# Patient Record
Sex: FEMALE | Race: WHITE | URBAN - METROPOLITAN AREA
[De-identification: names, ages, dates, MRNs, and addresses within clinical notes are randomized per-mention and may not be internally consistent; named-entity substitution may affect disease eponyms.]

---

## 2017-01-09 ENCOUNTER — HOSPITAL ENCOUNTER (OUTPATIENT)
Dept: GENERAL RADIOLOGY | Age: 52
Discharge: HOME | End: 2017-01-09
Attending: INTERNAL MEDICINE | Admitting: INTERNAL MEDICINE

## 2017-01-09 DIAGNOSIS — R05.9 COUGH: ICD-10-CM

## 2017-01-09 DIAGNOSIS — C19 MALIGNANT NEOPLASM OF RECTOSIGMOID JUNCTION (HCC): ICD-10-CM

## 2017-01-09 DIAGNOSIS — R06.02 BREATH SHORTNESS: ICD-10-CM

## 2017-01-11 ENCOUNTER — HOSPITAL ENCOUNTER (OUTPATIENT)
Dept: OTHER | Age: 52
Discharge: HOME | End: 2017-01-11
Attending: INTERNAL MEDICINE | Admitting: INTERNAL MEDICINE

## 2017-01-11 LAB
ALBUMIN SERPL-MCNC: 4 GM/DL (ref 3.4–5)
ALP BLD-CCNC: 168 IU/L (ref 40–129)
ALT SERPL-CCNC: 36 U/L (ref 10–40)
ANION GAP SERPL CALCULATED.3IONS-SCNC: 17 MMOL/L (ref 4–16)
AST SERPL-CCNC: 30 IU/L (ref 15–37)
BILIRUB SERPL-MCNC: 0.1 MG/DL (ref 0–1)
BUN BLDV-MCNC: 10 MG/DL (ref 6–23)
CALCIUM SERPL-MCNC: 8.7 MG/DL (ref 8.3–10.6)
CHLORIDE BLD-SCNC: 98 MMOL/L (ref 99–110)
CO2: 24 MMOL/L (ref 21–32)
CREAT SERPL-MCNC: 1.1 MG/DL (ref 0.6–1.1)
GFR AFRICAN AMERICAN: >60 ML/MIN/1.73M2
GFR NON-AFRICAN AMERICAN: 52 ML/MIN/1.73M2
GLUCOSE FASTING: 81 MG/DL (ref 70–99)
LACTATE DEHYDROGENASE: 230 IU/L (ref 120–246)
POTASSIUM SERPL-SCNC: 4 MMOL/L (ref 3.5–5.1)
SODIUM BLD-SCNC: 139 MMOL/L (ref 135–145)
TOTAL PROTEIN: 7.7 GM/DL (ref 6.4–8.2)

## 2017-01-16 ENCOUNTER — HOSPITAL ENCOUNTER (OUTPATIENT)
Dept: CT IMAGING | Age: 52
Discharge: HOME | End: 2017-01-16
Attending: INTERNAL MEDICINE | Admitting: INTERNAL MEDICINE

## 2017-01-16 DIAGNOSIS — C34.12 CANCER OF BRONCHUS OF LEFT UPPER LOBE (HCC): ICD-10-CM

## 2017-01-16 DIAGNOSIS — C34.12 MALIGNANT NEOPLASM OF UPPER LOBE, LEFT BRONCHUS OR LUNG (HCC): ICD-10-CM

## 2017-01-16 LAB
ALBUMIN SERPL-MCNC: 3.8 GM/DL (ref 3.4–5)
ALP BLD-CCNC: 153 IU/L (ref 40–129)
ALT SERPL-CCNC: 53 U/L (ref 10–40)
ANION GAP SERPL CALCULATED.3IONS-SCNC: 15 MMOL/L (ref 4–16)
AST SERPL-CCNC: 48 IU/L (ref 15–37)
BANDED NEUTROPHILS ABSOLUTE COUNT: 0.05 K/CU MM
BANDED NEUTROPHILS RELATIVE PERCENT: 1 % (ref 5–11)
BILIRUB SERPL-MCNC: 0.3 MG/DL (ref 0–1)
BUN BLDV-MCNC: 20 MG/DL (ref 6–23)
CALCIUM SERPL-MCNC: 8.6 MG/DL (ref 8.3–10.6)
CHLORIDE BLD-SCNC: 100 MMOL/L (ref 99–110)
CO2: 23 MMOL/L (ref 21–32)
CREAT SERPL-MCNC: 1.2 MG/DL (ref 0.6–1.1)
DIFFERENTIAL TYPE: ABNORMAL
GFR AFRICAN AMERICAN: 57 ML/MIN/1.73M2
GFR NON-AFRICAN AMERICAN: 47 ML/MIN/1.73M2
GLUCOSE FASTING: 100 MG/DL (ref 70–99)
HCT VFR BLD CALC: 20.5 % (ref 37–47)
HEMOGLOBIN: 6.3 GM/DL (ref 12.5–16)
LACTATE DEHYDROGENASE: 263 IU/L (ref 120–246)
LYMPHOCYTES ABSOLUTE: 2.1 K/CU MM
LYMPHOCYTES RELATIVE PERCENT: 38 % (ref 24–44)
MACROCYTES: ABNORMAL
MCH RBC QN AUTO: 30.4 PG (ref 27–31)
MCHC RBC AUTO-ENTMCNC: 30.7 % (ref 32–36)
MCV RBC AUTO: 99 FL (ref 78–100)
MONOCYTES ABSOLUTE: 0.2 K/CU MM
MONOCYTES RELATIVE PERCENT: 3 % (ref 0–4)
PDW BLD-RTO: 17.8 % (ref 11.7–14.9)
PLATELET # BLD: 603 K/CU MM (ref 140–440)
PLT MORPHOLOGY: ABNORMAL
PMV BLD AUTO: 10 FL (ref 7.5–11.1)
POLYCHROMASIA: ABNORMAL
POTASSIUM SERPL-SCNC: 4.3 MMOL/L (ref 3.5–5.1)
RBC # BLD: 2.07 M/CU MM (ref 4.2–5.4)
SEGMENTED NEUTROPHILS ABSOLUTE COUNT: 3.1 K/CU MM
SEGMENTED NEUTROPHILS RELATIVE PERCENT: 58 % (ref 36–66)
SODIUM BLD-SCNC: 138 MMOL/L (ref 135–145)
TOTAL PROTEIN: 7.4 GM/DL (ref 6.4–8.2)
TOXIC GRANULATION: PRESENT
WBC # BLD: 5.4 K/CU MM (ref 4–10.5)

## 2017-01-16 RX ORDER — SODIUM CHLORIDE 0.9 % (FLUSH) 0.9 %
10 SYRINGE (ML) INJECTION ONCE
Status: COMPLETED | OUTPATIENT
Start: 2017-01-16 | End: 2017-01-16

## 2017-01-16 RX ADMIN — Medication 10 ML: at 12:03

## 2017-01-18 ENCOUNTER — HOSPITAL ENCOUNTER (OUTPATIENT)
Dept: OTHER | Age: 52
Discharge: HOME | End: 2017-01-18
Attending: INTERNAL MEDICINE | Admitting: INTERNAL MEDICINE

## 2017-01-18 LAB
BILIRUB SERPL-MCNC: 0.2 MG/DL (ref 0–1)
BILIRUBIN DIRECT: 0.2 MG/DL (ref 0–0.3)
BILIRUBIN, INDIRECT: 0 MG/DL (ref 0–0.7)
DIFFERENTIAL TYPE: ABNORMAL
FERRITIN: 621 NG/ML (ref 15–150)
FOLATE: >20 NG/ML (ref 3.1–17.5)
HCT VFR BLD CALC: 25.7 % (ref 37–47)
HEMOGLOBIN: 8 GM/DL (ref 12.5–16)
IRON: 259 UG/DL (ref 37–145)
LACTATE DEHYDROGENASE: 238 IU/L (ref 120–246)
LYMPHOCYTES ABSOLUTE: 1.7 K/CU MM
LYMPHOCYTES RELATIVE PERCENT: 70 % (ref 24–44)
MACROCYTES: ABNORMAL
MCH RBC QN AUTO: 30.8 PG (ref 27–31)
MCHC RBC AUTO-ENTMCNC: 31.1 % (ref 32–36)
MCV RBC AUTO: 98.8 FL (ref 78–100)
MONOCYTES ABSOLUTE: 0 K/CU MM
MONOCYTES RELATIVE PERCENT: 2 % (ref 0–4)
PCT TRANSFERRIN: 77 % (ref 10–44)
PDW BLD-RTO: 17.5 % (ref 11.7–14.9)
PLATELET # BLD: 479 K/CU MM (ref 140–440)
PLT MORPHOLOGY: ABNORMAL
PMV BLD AUTO: 9.1 FL (ref 7.5–11.1)
POLYCHROMASIA: ABNORMAL
RBC # BLD: 2.6 M/CU MM (ref 4.2–5.4)
RETICULOCYTE COUNT PCT: 0.3 % (ref 0.2–2.2)
SEGMENTED NEUTROPHILS ABSOLUTE COUNT: 0.7 K/CU MM
SEGMENTED NEUTROPHILS RELATIVE PERCENT: 28 % (ref 36–66)
TOTAL IRON BINDING CAPACITY: 338 UG/DL (ref 250–450)
UNSATURATED IRON BINDING CAPACITY: 79 UG/DL (ref 110–370)
VITAMIN B-12: 1322 PG/ML (ref 211–911)
WBC # BLD: 2.4 K/CU MM (ref 4–10.5)

## 2017-01-19 LAB — HAPTOGLOBIN: 362

## 2017-01-30 ENCOUNTER — HOSPITAL ENCOUNTER (OUTPATIENT)
Dept: CT IMAGING | Age: 52
Discharge: HOME | End: 2017-01-30
Attending: INTERNAL MEDICINE | Admitting: INTERNAL MEDICINE

## 2017-01-30 DIAGNOSIS — C34.12 CANCER OF BRONCHUS OF LEFT UPPER LOBE (HCC): ICD-10-CM

## 2017-01-30 DIAGNOSIS — C34.12 MALIGNANT NEOPLASM OF UPPER LOBE, LEFT BRONCHUS OR LUNG (HCC): ICD-10-CM

## 2017-01-30 RX ORDER — SODIUM CHLORIDE 0.9 % (FLUSH) 0.9 %
10 SYRINGE (ML) INJECTION ONCE
Status: DISCONTINUED | OUTPATIENT
Start: 2017-01-30 | End: 2017-01-30

## 2017-02-16 ENCOUNTER — HOSPITAL ENCOUNTER (OUTPATIENT)
Dept: PET IMAGING | Age: 52
Discharge: HOME | End: 2017-02-16
Attending: RADIOLOGY | Admitting: RADIOLOGY

## 2017-02-16 DIAGNOSIS — C34.12 CANCER OF BRONCHUS OF LEFT UPPER LOBE (HCC): ICD-10-CM

## 2017-02-16 DIAGNOSIS — C34.12 MALIGNANT NEOPLASM OF UPPER LOBE, LEFT BRONCHUS OR LUNG (HCC): ICD-10-CM

## 2017-02-17 ENCOUNTER — HOSPITAL ENCOUNTER (OUTPATIENT)
Dept: OTHER | Age: 52
Discharge: HOME | End: 2017-02-17
Attending: INTERNAL MEDICINE | Admitting: INTERNAL MEDICINE

## 2017-02-17 LAB
ALBUMIN SERPL-MCNC: 3.8 GM/DL (ref 3.4–5)
ALP BLD-CCNC: 185 IU/L (ref 40–128)
ALT SERPL-CCNC: 26 U/L (ref 10–40)
ANION GAP SERPL CALCULATED.3IONS-SCNC: 19 MMOL/L (ref 4–16)
AST SERPL-CCNC: 31 IU/L (ref 15–37)
BILIRUB SERPL-MCNC: 0.3 MG/DL (ref 0–1)
BUN BLDV-MCNC: 13 MG/DL (ref 6–23)
CALCIUM SERPL-MCNC: 9.8 MG/DL (ref 8.3–10.6)
CEA: 2 NG/ML
CHLORIDE BLD-SCNC: 94 MMOL/L (ref 99–110)
CO2: 25 MMOL/L (ref 21–32)
CREAT SERPL-MCNC: 1.1 MG/DL (ref 0.6–1.1)
GFR AFRICAN AMERICAN: >60 ML/MIN/1.73M2
GFR NON-AFRICAN AMERICAN: 52 ML/MIN/1.73M2
GLUCOSE FASTING: 76 MG/DL (ref 70–99)
LACTATE DEHYDROGENASE: 309 IU/L (ref 120–246)
POTASSIUM SERPL-SCNC: 4.6 MMOL/L (ref 3.5–5.1)
SODIUM BLD-SCNC: 138 MMOL/L (ref 135–145)
TOTAL PROTEIN: 8.8 GM/DL (ref 6.4–8.2)

## 2017-03-06 ENCOUNTER — HOSPITAL ENCOUNTER (OUTPATIENT)
Dept: OTHER | Age: 52
Discharge: HOME | End: 2017-03-06
Attending: INTERNAL MEDICINE | Admitting: INTERNAL MEDICINE

## 2017-03-06 LAB
ALBUMIN SERPL-MCNC: 3.5 GM/DL (ref 3.4–5)
ALP BLD-CCNC: 128 IU/L (ref 40–129)
ALT SERPL-CCNC: 17 U/L (ref 10–40)
ANION GAP SERPL CALCULATED.3IONS-SCNC: 14 MMOL/L (ref 4–16)
AST SERPL-CCNC: 24 IU/L (ref 15–37)
BILIRUB SERPL-MCNC: 0.2 MG/DL (ref 0–1)
BUN BLDV-MCNC: 12 MG/DL (ref 6–23)
CALCIUM SERPL-MCNC: 9.2 MG/DL (ref 8.3–10.6)
CHLORIDE BLD-SCNC: 94 MMOL/L (ref 99–110)
CO2: 25 MMOL/L (ref 21–32)
CREAT SERPL-MCNC: 0.8 MG/DL (ref 0.6–1.1)
GFR AFRICAN AMERICAN: >60 ML/MIN/1.73M2
GFR NON-AFRICAN AMERICAN: >60 ML/MIN/1.73M2
GLUCOSE BLD-MCNC: 89 MG/DL (ref 70–140)
POTASSIUM SERPL-SCNC: 3.9 MMOL/L (ref 3.5–5.1)
SODIUM BLD-SCNC: 133 MMOL/L (ref 135–145)
T3 FREE: 2.4 PG/ML (ref 2.3–4.2)
T4 FREE: 1.01 NG/DL (ref 0.9–1.8)
TOTAL PROTEIN: 7.6 GM/DL (ref 6.4–8.2)
TSH HIGH SENSITIVITY: 0.74 UIU/ML (ref 0.27–4.2)

## 2017-03-07 LAB — T4 TOTAL: 6.62 UG/DL

## 2017-03-27 ENCOUNTER — HOSPITAL ENCOUNTER (OUTPATIENT)
Dept: OTHER | Age: 52
Discharge: HOME | End: 2017-03-27
Attending: INTERNAL MEDICINE | Admitting: INTERNAL MEDICINE

## 2017-03-27 LAB
ALBUMIN SERPL-MCNC: 3.4 GM/DL (ref 3.4–5)
ALP BLD-CCNC: 129 IU/L (ref 40–129)
ALT SERPL-CCNC: 17 U/L (ref 10–40)
ANION GAP SERPL CALCULATED.3IONS-SCNC: 14 MMOL/L (ref 4–16)
AST SERPL-CCNC: 31 IU/L (ref 15–37)
BILIRUB SERPL-MCNC: 0.2 MG/DL (ref 0–1)
BUN BLDV-MCNC: 16 MG/DL (ref 6–23)
CALCIUM SERPL-MCNC: 8.8 MG/DL (ref 8.3–10.6)
CHLORIDE BLD-SCNC: 102 MMOL/L (ref 99–110)
CO2: 24 MMOL/L (ref 21–32)
CREAT SERPL-MCNC: 0.9 MG/DL (ref 0.6–1.1)
GFR AFRICAN AMERICAN: >60 ML/MIN/1.73M2
GFR NON-AFRICAN AMERICAN: >60 ML/MIN/1.73M2
GLUCOSE BLD-MCNC: 77 MG/DL (ref 70–140)
POTASSIUM SERPL-SCNC: 4.4 MMOL/L (ref 3.5–5.1)
SODIUM BLD-SCNC: 140 MMOL/L (ref 135–145)
T3 FREE: 2.3 PG/ML (ref 2.3–4.2)
T4 FREE: 1.02 NG/DL (ref 0.9–1.8)
TOTAL PROTEIN: 7.9 GM/DL (ref 6.4–8.2)
TSH HIGH SENSITIVITY: 0.98 UIU/ML (ref 0.27–4.2)

## 2017-03-29 LAB — T4 TOTAL: 6.88 UG/DL

## 2017-04-12 ENCOUNTER — HOSPITAL ENCOUNTER (OUTPATIENT)
Dept: OTHER | Age: 52
Discharge: HOME | End: 2017-04-12
Attending: INTERNAL MEDICINE | Admitting: INTERNAL MEDICINE

## 2017-04-12 LAB
ALBUMIN SERPL-MCNC: 4.1 GM/DL (ref 3.4–5)
ALP BLD-CCNC: 133 IU/L (ref 40–128)
ALT SERPL-CCNC: 21 U/L (ref 10–40)
ANION GAP SERPL CALCULATED.3IONS-SCNC: 14 MMOL/L (ref 4–16)
AST SERPL-CCNC: 25 IU/L (ref 15–37)
BILIRUB SERPL-MCNC: 0.2 MG/DL (ref 0–1)
BUN BLDV-MCNC: 14 MG/DL (ref 6–23)
CALCIUM SERPL-MCNC: 9.2 MG/DL (ref 8.3–10.6)
CEA: 1.7 NG/ML
CHLORIDE BLD-SCNC: 103 MMOL/L (ref 99–110)
CO2: 23 MMOL/L (ref 21–32)
CREAT SERPL-MCNC: 1 MG/DL (ref 0.6–1.1)
GFR AFRICAN AMERICAN: >60 ML/MIN/1.73M2
GFR NON-AFRICAN AMERICAN: 58 ML/MIN/1.73M2
GLUCOSE BLD-MCNC: 76 MG/DL (ref 70–140)
LACTATE DEHYDROGENASE: 231 IU/L (ref 120–246)
POTASSIUM SERPL-SCNC: 4.2 MMOL/L (ref 3.5–5.1)
SODIUM BLD-SCNC: 140 MMOL/L (ref 135–145)
TOTAL PROTEIN: 7.7 GM/DL (ref 6.4–8.2)

## 2017-05-08 ENCOUNTER — HOSPITAL ENCOUNTER (OUTPATIENT)
Dept: OTHER | Age: 52
Discharge: HOME | End: 2017-05-08
Attending: INTERNAL MEDICINE | Admitting: INTERNAL MEDICINE

## 2017-05-08 LAB
ALBUMIN SERPL-MCNC: 4 GM/DL (ref 3.4–5)
ALP BLD-CCNC: 139 IU/L (ref 40–129)
ALT SERPL-CCNC: 23 U/L (ref 10–40)
ANION GAP SERPL CALCULATED.3IONS-SCNC: 14 MMOL/L (ref 4–16)
AST SERPL-CCNC: 32 IU/L (ref 15–37)
BILIRUB SERPL-MCNC: 0.1 MG/DL (ref 0–1)
BUN BLDV-MCNC: 16 MG/DL (ref 6–23)
CALCIUM SERPL-MCNC: 9.1 MG/DL (ref 8.3–10.6)
CHLORIDE BLD-SCNC: 106 MMOL/L (ref 99–110)
CO2: 23 MMOL/L (ref 21–32)
CREAT SERPL-MCNC: 0.9 MG/DL (ref 0.6–1.1)
GFR AFRICAN AMERICAN: >60 ML/MIN/1.73M2
GFR NON-AFRICAN AMERICAN: >60 ML/MIN/1.73M2
GLUCOSE BLD-MCNC: 92 MG/DL (ref 70–140)
LACTATE DEHYDROGENASE: 232 IU/L (ref 120–246)
POTASSIUM SERPL-SCNC: 4.5 MMOL/L (ref 3.5–5.1)
SODIUM BLD-SCNC: 143 MMOL/L (ref 135–145)
TOTAL PROTEIN: 7.1 GM/DL (ref 6.4–8.2)

## 2017-05-12 ENCOUNTER — HOSPITAL ENCOUNTER (OUTPATIENT)
Dept: CT IMAGING | Age: 52
Discharge: HOME | End: 2017-05-12
Attending: INTERNAL MEDICINE | Admitting: INTERNAL MEDICINE

## 2017-05-12 DIAGNOSIS — C78.1 SECONDARY MALIGNANT NEOPLASM OF MEDIASTINUM (HCC): ICD-10-CM

## 2017-05-12 RX ORDER — 0.9 % SODIUM CHLORIDE 0.9 %
10 VIAL (ML) INJECTION 2 TIMES DAILY
Status: DISCONTINUED | OUTPATIENT
Start: 2017-05-12 | End: 2017-05-13 | Stop reason: HOSPADM

## 2017-05-12 RX ADMIN — Medication 10 ML: at 11:08

## 2017-05-30 ENCOUNTER — HOSPITAL ENCOUNTER (OUTPATIENT)
Dept: OTHER | Age: 52
Discharge: HOME | End: 2017-05-30
Attending: INTERNAL MEDICINE | Admitting: INTERNAL MEDICINE

## 2017-05-30 LAB
ALBUMIN SERPL-MCNC: 4.1 GM/DL (ref 3.4–5)
ALP BLD-CCNC: 120 IU/L (ref 40–129)
ALT SERPL-CCNC: 16 U/L (ref 10–40)
ANION GAP SERPL CALCULATED.3IONS-SCNC: 17 MMOL/L (ref 4–16)
AST SERPL-CCNC: 26 IU/L (ref 15–37)
BILIRUB SERPL-MCNC: 0.1 MG/DL (ref 0–1)
BUN BLDV-MCNC: 15 MG/DL (ref 6–23)
CALCIUM SERPL-MCNC: 9.3 MG/DL (ref 8.3–10.6)
CHLORIDE BLD-SCNC: 104 MMOL/L (ref 99–110)
CO2: 21 MMOL/L (ref 21–32)
CREAT SERPL-MCNC: 0.8 MG/DL (ref 0.6–1.1)
GFR AFRICAN AMERICAN: >60 ML/MIN/1.73M2
GFR NON-AFRICAN AMERICAN: >60 ML/MIN/1.73M2
GLUCOSE BLD-MCNC: 98 MG/DL (ref 70–140)
POTASSIUM SERPL-SCNC: 4.2 MMOL/L (ref 3.5–5.1)
SODIUM BLD-SCNC: 142 MMOL/L (ref 135–145)
T3 FREE: 3.3 PG/ML (ref 2.3–4.2)
T4 FREE: 1.34 NG/DL (ref 0.9–1.8)
TOTAL PROTEIN: 7.4 GM/DL (ref 6.4–8.2)
TSH HIGH SENSITIVITY: 1.03 UIU/ML (ref 0.27–4.2)

## 2017-06-01 LAB — T4 TOTAL: 8.71 UG/DL

## 2017-06-20 ENCOUNTER — HOSPITAL ENCOUNTER (OUTPATIENT)
Dept: OTHER | Age: 52
Discharge: HOME | End: 2017-06-20
Attending: INTERNAL MEDICINE | Admitting: INTERNAL MEDICINE

## 2017-06-20 LAB
ALBUMIN SERPL-MCNC: 4 GM/DL (ref 3.4–5)
ALP BLD-CCNC: 119 IU/L (ref 40–129)
ALT SERPL-CCNC: 15 U/L (ref 10–40)
ANION GAP SERPL CALCULATED.3IONS-SCNC: 17 MMOL/L (ref 4–16)
AST SERPL-CCNC: 22 IU/L (ref 15–37)
BILIRUB SERPL-MCNC: 0.1 MG/DL (ref 0–1)
BUN BLDV-MCNC: 15 MG/DL (ref 6–23)
CALCIUM SERPL-MCNC: 9.2 MG/DL (ref 8.3–10.6)
CHLORIDE BLD-SCNC: 103 MMOL/L (ref 99–110)
CO2: 22 MMOL/L (ref 21–32)
CREAT SERPL-MCNC: 0.9 MG/DL (ref 0.6–1.1)
GFR AFRICAN AMERICAN: >60 ML/MIN/1.73M2
GFR NON-AFRICAN AMERICAN: >60 ML/MIN/1.73M2
GLUCOSE BLD-MCNC: 97 MG/DL (ref 70–140)
POTASSIUM SERPL-SCNC: 4.4 MMOL/L (ref 3.5–5.1)
SODIUM BLD-SCNC: 142 MMOL/L (ref 135–145)
T3 FREE: 2.4 PG/ML (ref 2.3–4.2)
T4 FREE: 1.18 NG/DL (ref 0.9–1.8)
TOTAL PROTEIN: 7.2 GM/DL (ref 6.4–8.2)
TSH HIGH SENSITIVITY: 0.83 UIU/ML (ref 0.27–4.2)

## 2017-06-22 LAB — T4 TOTAL: 7.13 UG/DL

## 2017-08-01 ENCOUNTER — HOSPITAL ENCOUNTER (OUTPATIENT)
Dept: OTHER | Age: 52
Discharge: HOME | End: 2017-08-01
Attending: INTERNAL MEDICINE | Admitting: INTERNAL MEDICINE

## 2017-08-01 LAB
ALBUMIN SERPL-MCNC: 3.9 GM/DL (ref 3.4–5)
ALP BLD-CCNC: 129 IU/L (ref 40–128)
ALT SERPL-CCNC: 19 U/L (ref 10–40)
ANION GAP SERPL CALCULATED.3IONS-SCNC: 12 MMOL/L (ref 4–16)
AST SERPL-CCNC: 25 IU/L (ref 15–37)
BILIRUB SERPL-MCNC: 0.2 MG/DL (ref 0–1)
BUN BLDV-MCNC: 16 MG/DL (ref 6–23)
CALCIUM SERPL-MCNC: 9.1 MG/DL (ref 8.3–10.6)
CHLORIDE BLD-SCNC: 103 MMOL/L (ref 99–110)
CO2: 24 MMOL/L (ref 21–32)
CREAT SERPL-MCNC: 1.1 MG/DL (ref 0.6–1.1)
GFR AFRICAN AMERICAN: >60 ML/MIN/1.73M2
GFR NON-AFRICAN AMERICAN: 52 ML/MIN/1.73M2
GLUCOSE BLD-MCNC: 82 MG/DL (ref 70–140)
LACTATE DEHYDROGENASE: 155 IU/L (ref 120–246)
POTASSIUM SERPL-SCNC: 4.5 MMOL/L (ref 3.5–5.1)
SODIUM BLD-SCNC: 139 MMOL/L (ref 135–145)
TOTAL PROTEIN: 7 GM/DL (ref 6.4–8.2)

## 2017-08-22 ENCOUNTER — HOSPITAL ENCOUNTER (OUTPATIENT)
Dept: OTHER | Age: 52
Discharge: HOME | End: 2017-08-22
Attending: INTERNAL MEDICINE | Admitting: INTERNAL MEDICINE

## 2017-08-22 LAB
ALBUMIN SERPL-MCNC: 4.1 GM/DL (ref 3.4–5)
ALP BLD-CCNC: 129 IU/L (ref 40–128)
ALT SERPL-CCNC: 31 U/L (ref 10–40)
ANION GAP SERPL CALCULATED.3IONS-SCNC: 14 MMOL/L (ref 4–16)
AST SERPL-CCNC: 31 IU/L (ref 15–37)
BILIRUB SERPL-MCNC: 0.2 MG/DL (ref 0–1)
BUN BLDV-MCNC: 16 MG/DL (ref 6–23)
CALCIUM SERPL-MCNC: 9.2 MG/DL (ref 8.3–10.6)
CHLORIDE BLD-SCNC: 106 MMOL/L (ref 99–110)
CO2: 21 MMOL/L (ref 21–32)
CREAT SERPL-MCNC: 0.8 MG/DL (ref 0.6–1.1)
GFR AFRICAN AMERICAN: >60 ML/MIN/1.73M2
GFR NON-AFRICAN AMERICAN: >60 ML/MIN/1.73M2
GLUCOSE BLD-MCNC: 81 MG/DL (ref 70–140)
POTASSIUM SERPL-SCNC: 4.5 MMOL/L (ref 3.5–5.1)
SODIUM BLD-SCNC: 141 MMOL/L (ref 135–145)
TOTAL PROTEIN: 7.4 GM/DL (ref 6.4–8.2)
TSH HIGH SENSITIVITY: 1.29 UIU/ML (ref 0.27–4.2)

## 2017-09-14 ENCOUNTER — HOSPITAL ENCOUNTER (OUTPATIENT)
Dept: OTHER | Age: 52
Discharge: HOME | End: 2017-09-14
Attending: INTERNAL MEDICINE | Admitting: INTERNAL MEDICINE

## 2017-09-14 LAB
ALBUMIN SERPL-MCNC: 4.3 GM/DL (ref 3.4–5)
ALP BLD-CCNC: 134 IU/L (ref 40–128)
ALT SERPL-CCNC: 26 U/L (ref 10–40)
ANION GAP SERPL CALCULATED.3IONS-SCNC: 15 MMOL/L (ref 4–16)
AST SERPL-CCNC: 26 IU/L (ref 15–37)
BILIRUB SERPL-MCNC: 0.2 MG/DL (ref 0–1)
BUN BLDV-MCNC: 17 MG/DL (ref 6–23)
CALCIUM SERPL-MCNC: 9.1 MG/DL (ref 8.3–10.6)
CHLORIDE BLD-SCNC: 102 MMOL/L (ref 99–110)
CO2: 21 MMOL/L (ref 21–32)
CREAT SERPL-MCNC: 0.9 MG/DL (ref 0.6–1.1)
GFR AFRICAN AMERICAN: >60 ML/MIN/1.73M2
GFR NON-AFRICAN AMERICAN: >60 ML/MIN/1.73M2
GLUCOSE BLD-MCNC: 82 MG/DL (ref 70–140)
POTASSIUM SERPL-SCNC: 4.3 MMOL/L (ref 3.5–5.1)
SODIUM BLD-SCNC: 138 MMOL/L (ref 135–145)
T4 FREE: 1.06 NG/DL (ref 0.9–1.8)
TOTAL PROTEIN: 7.6 GM/DL (ref 6.4–8.2)
TSH HIGH SENSITIVITY: 1.39 UIU/ML (ref 0.27–4.2)

## 2017-10-05 ENCOUNTER — HOSPITAL ENCOUNTER (OUTPATIENT)
Dept: OTHER | Age: 52
Discharge: HOME | End: 2017-10-05
Attending: INTERNAL MEDICINE | Admitting: INTERNAL MEDICINE

## 2017-10-05 LAB
ALBUMIN SERPL-MCNC: 4.4 GM/DL (ref 3.4–5)
ALP BLD-CCNC: 134 IU/L (ref 40–129)
ALT SERPL-CCNC: 33 U/L (ref 10–40)
ANION GAP SERPL CALCULATED.3IONS-SCNC: 18 MMOL/L (ref 4–16)
AST SERPL-CCNC: 32 IU/L (ref 15–37)
BILIRUB SERPL-MCNC: 0.2 MG/DL (ref 0–1)
BUN BLDV-MCNC: 23 MG/DL (ref 6–23)
CALCIUM SERPL-MCNC: 9.1 MG/DL (ref 8.3–10.6)
CHLORIDE BLD-SCNC: 106 MMOL/L (ref 99–110)
CO2: 20 MMOL/L (ref 21–32)
CREAT SERPL-MCNC: 1 MG/DL (ref 0.6–1.1)
GFR AFRICAN AMERICAN: >60 ML/MIN/1.73M2
GFR NON-AFRICAN AMERICAN: 58 ML/MIN/1.73M2
GLUCOSE BLD-MCNC: 131 MG/DL (ref 70–140)
POTASSIUM SERPL-SCNC: 4 MMOL/L (ref 3.5–5.1)
SODIUM BLD-SCNC: 144 MMOL/L (ref 135–145)
T4 FREE: 1.27 NG/DL (ref 0.9–1.8)
TOTAL PROTEIN: 7.3 GM/DL (ref 6.4–8.2)
TSH HIGH SENSITIVITY: 0.89 UIU/ML (ref 0.27–4.2)

## 2017-10-26 ENCOUNTER — HOSPITAL ENCOUNTER (OUTPATIENT)
Dept: OTHER | Age: 52
Discharge: HOME | End: 2017-10-26
Attending: INTERNAL MEDICINE | Admitting: INTERNAL MEDICINE

## 2017-10-26 LAB
ALBUMIN SERPL-MCNC: 4 GM/DL (ref 3.4–5)
ALP BLD-CCNC: 155 IU/L (ref 40–129)
ALT SERPL-CCNC: 32 U/L (ref 10–40)
ANION GAP SERPL CALCULATED.3IONS-SCNC: 15 MMOL/L (ref 4–16)
AST SERPL-CCNC: 24 IU/L (ref 15–37)
BILIRUB SERPL-MCNC: 0.2 MG/DL (ref 0–1)
BUN BLDV-MCNC: 12 MG/DL (ref 6–23)
CALCIUM SERPL-MCNC: 9 MG/DL (ref 8.3–10.6)
CHLORIDE BLD-SCNC: 102 MMOL/L (ref 99–110)
CO2: 24 MMOL/L (ref 21–32)
CREAT SERPL-MCNC: 1 MG/DL (ref 0.6–1.1)
GFR AFRICAN AMERICAN: >60 ML/MIN/1.73M2
GFR NON-AFRICAN AMERICAN: 58 ML/MIN/1.73M2
GLUCOSE BLD-MCNC: 78 MG/DL (ref 70–140)
POTASSIUM SERPL-SCNC: 3.8 MMOL/L (ref 3.5–5.1)
SODIUM BLD-SCNC: 141 MMOL/L (ref 135–145)
TOTAL PROTEIN: 6.8 GM/DL (ref 6.4–8.2)

## 2017-11-01 ENCOUNTER — HOSPITAL ENCOUNTER (OUTPATIENT)
Dept: OTHER | Age: 52
Discharge: HOME | End: 2017-11-01
Attending: INTERNAL MEDICINE | Admitting: INTERNAL MEDICINE

## 2017-11-01 LAB
ALBUMIN SERPL-MCNC: 4.4 GM/DL (ref 3.4–5)
ALP BLD-CCNC: 139 IU/L (ref 40–128)
ALT SERPL-CCNC: 20 U/L (ref 10–40)
ANION GAP SERPL CALCULATED.3IONS-SCNC: 14 MMOL/L (ref 4–16)
AST SERPL-CCNC: 21 IU/L (ref 15–37)
BILIRUB SERPL-MCNC: 0.3 MG/DL (ref 0–1)
BUN BLDV-MCNC: 13 MG/DL (ref 6–23)
CALCIUM SERPL-MCNC: 9.5 MG/DL (ref 8.3–10.6)
CHLORIDE BLD-SCNC: 101 MMOL/L (ref 99–110)
CO2: 25 MMOL/L (ref 21–32)
CREAT SERPL-MCNC: 1 MG/DL (ref 0.6–1.1)
GFR AFRICAN AMERICAN: >60 ML/MIN/1.73M2
GFR NON-AFRICAN AMERICAN: 58 ML/MIN/1.73M2
GLUCOSE BLD-MCNC: 84 MG/DL (ref 70–140)
LACTATE DEHYDROGENASE: 159 IU/L (ref 120–246)
MAGNESIUM: 2.5 MG/DL (ref 1.8–2.4)
POTASSIUM SERPL-SCNC: 4.7 MMOL/L (ref 3.5–5.1)
SODIUM BLD-SCNC: 140 MMOL/L (ref 135–145)
TOTAL PROTEIN: 7.6 GM/DL (ref 6.4–8.2)
URIC ACID: 4.1 MG/DL (ref 2.6–6)

## 2017-11-16 ENCOUNTER — HOSPITAL ENCOUNTER (OUTPATIENT)
Dept: OTHER | Age: 52
Discharge: HOME | End: 2017-11-16
Attending: INTERNAL MEDICINE | Admitting: INTERNAL MEDICINE

## 2017-11-16 LAB
ALBUMIN SERPL-MCNC: 4.4 GM/DL (ref 3.4–5)
ALP BLD-CCNC: 122 IU/L (ref 40–128)
ALT SERPL-CCNC: 19 U/L (ref 10–40)
ANION GAP SERPL CALCULATED.3IONS-SCNC: 15 MMOL/L (ref 4–16)
AST SERPL-CCNC: 24 IU/L (ref 15–37)
BILIRUB SERPL-MCNC: 0.2 MG/DL (ref 0–1)
BUN BLDV-MCNC: 12 MG/DL (ref 6–23)
CALCIUM SERPL-MCNC: 9.1 MG/DL (ref 8.3–10.6)
CHLORIDE BLD-SCNC: 101 MMOL/L (ref 99–110)
CO2: 23 MMOL/L (ref 21–32)
CREAT SERPL-MCNC: 1.1 MG/DL (ref 0.6–1.1)
GFR AFRICAN AMERICAN: >60 ML/MIN/1.73M2
GFR NON-AFRICAN AMERICAN: 52 ML/MIN/1.73M2
GLUCOSE BLD-MCNC: 147 MG/DL (ref 70–140)
POTASSIUM SERPL-SCNC: 3.6 MMOL/L (ref 3.5–5.1)
SODIUM BLD-SCNC: 139 MMOL/L (ref 135–145)
T4 FREE: 1.35 NG/DL (ref 0.9–1.8)
TOTAL PROTEIN: 7 GM/DL (ref 6.4–8.2)
TSH HIGH SENSITIVITY: 1.22 UIU/ML (ref 0.27–4.2)

## 2017-11-20 ENCOUNTER — HOSPITAL ENCOUNTER (OUTPATIENT)
Dept: GENERAL RADIOLOGY | Age: 52
Discharge: HOME | End: 2017-11-20
Attending: INTERNAL MEDICINE | Admitting: INTERNAL MEDICINE

## 2017-11-20 DIAGNOSIS — C34.90 NON-SMALL CELL LUNG CANCER, UNSPECIFIED LATERALITY (HCC): ICD-10-CM

## 2017-12-05 ENCOUNTER — HOSPITAL ENCOUNTER (OUTPATIENT)
Dept: OTHER | Age: 52
Discharge: HOME | End: 2017-12-05
Attending: INTERNAL MEDICINE | Admitting: INTERNAL MEDICINE

## 2017-12-05 LAB
ALBUMIN SERPL-MCNC: 4.1 GM/DL (ref 3.4–5)
ALP BLD-CCNC: 116 IU/L (ref 40–128)
ALT SERPL-CCNC: 23 U/L (ref 10–40)
ANION GAP SERPL CALCULATED.3IONS-SCNC: 14 MMOL/L (ref 4–16)
AST SERPL-CCNC: 25 IU/L (ref 15–37)
BILIRUB SERPL-MCNC: 0.2 MG/DL (ref 0–1)
BUN BLDV-MCNC: 17 MG/DL (ref 6–23)
CALCIUM SERPL-MCNC: 9.2 MG/DL (ref 8.3–10.6)
CHLORIDE BLD-SCNC: 103 MMOL/L (ref 99–110)
CO2: 23 MMOL/L (ref 21–32)
CREAT SERPL-MCNC: 1 MG/DL (ref 0.6–1.1)
GFR AFRICAN AMERICAN: >60 ML/MIN/1.73M2
GFR NON-AFRICAN AMERICAN: 58 ML/MIN/1.73M2
GLUCOSE BLD-MCNC: 87 MG/DL (ref 70–99)
POTASSIUM SERPL-SCNC: 4 MMOL/L (ref 3.5–5.1)
SODIUM BLD-SCNC: 140 MMOL/L (ref 135–145)
T4 FREE: 1.16 NG/DL (ref 0.9–1.8)
TOTAL PROTEIN: 7 GM/DL (ref 6.4–8.2)
TSH HIGH SENSITIVITY: 2.03 UIU/ML (ref 0.27–4.2)

## 2018-01-19 ENCOUNTER — HOSPITAL ENCOUNTER (OUTPATIENT)
Dept: OTHER | Age: 53
Discharge: HOME | End: 2018-01-19
Attending: INTERNAL MEDICINE | Admitting: INTERNAL MEDICINE

## 2018-01-19 LAB
ALBUMIN SERPL-MCNC: 3.6 GM/DL (ref 3.4–5)
ALP BLD-CCNC: 100 IU/L (ref 40–129)
ALT SERPL-CCNC: 16 U/L (ref 10–40)
ANION GAP SERPL CALCULATED.3IONS-SCNC: 14 MMOL/L (ref 4–16)
AST SERPL-CCNC: 20 IU/L (ref 15–37)
BILIRUB SERPL-MCNC: 0.2 MG/DL (ref 0–1)
BUN BLDV-MCNC: 11 MG/DL (ref 6–23)
CALCIUM SERPL-MCNC: 8.6 MG/DL (ref 8.3–10.6)
CHLORIDE BLD-SCNC: 103 MMOL/L (ref 99–110)
CO2: 24 MMOL/L (ref 21–32)
CREAT SERPL-MCNC: 0.9 MG/DL (ref 0.6–1.1)
GFR AFRICAN AMERICAN: >60 ML/MIN/1.73M2
GFR NON-AFRICAN AMERICAN: >60 ML/MIN/1.73M2
GLUCOSE BLD-MCNC: 84 MG/DL (ref 70–99)
POTASSIUM SERPL-SCNC: 4 MMOL/L (ref 3.5–5.1)
SODIUM BLD-SCNC: 141 MMOL/L (ref 135–145)
TOTAL PROTEIN: 5.9 GM/DL (ref 6.4–8.2)
TSH HIGH SENSITIVITY: 1.31 UIU/ML (ref 0.27–4.2)

## 2018-03-01 ENCOUNTER — HOSPITAL ENCOUNTER (OUTPATIENT)
Dept: OTHER | Age: 53
Discharge: HOME | End: 2018-03-01
Attending: INTERNAL MEDICINE | Admitting: INTERNAL MEDICINE

## 2018-03-01 LAB
ALBUMIN SERPL-MCNC: 3.6 GM/DL (ref 3.4–5)
ALP BLD-CCNC: 97 IU/L (ref 40–129)
ALT SERPL-CCNC: 25 U/L (ref 10–40)
ANION GAP SERPL CALCULATED.3IONS-SCNC: 18 MMOL/L (ref 4–16)
AST SERPL-CCNC: 19 IU/L (ref 15–37)
BILIRUB SERPL-MCNC: 0.3 MG/DL (ref 0–1)
BUN BLDV-MCNC: 10 MG/DL (ref 6–23)
CALCIUM SERPL-MCNC: 8.6 MG/DL (ref 8.3–10.6)
CHLORIDE BLD-SCNC: 103 MMOL/L (ref 99–110)
CO2: 22 MMOL/L (ref 21–32)
CREAT SERPL-MCNC: 0.9 MG/DL (ref 0.6–1.1)
GFR AFRICAN AMERICAN: >60 ML/MIN/1.73M2
GFR NON-AFRICAN AMERICAN: >60 ML/MIN/1.73M2
GLUCOSE BLD-MCNC: 111 MG/DL (ref 70–99)
POTASSIUM SERPL-SCNC: 3.7 MMOL/L (ref 3.5–5.1)
SODIUM BLD-SCNC: 143 MMOL/L (ref 135–145)
T4 FREE: 1.27 NG/DL (ref 0.9–1.8)
TOTAL PROTEIN: 5.9 GM/DL (ref 6.4–8.2)
TSH HIGH SENSITIVITY: 1.7 UIU/ML (ref 0.27–4.2)

## 2018-04-09 ENCOUNTER — HOSPITAL ENCOUNTER (OUTPATIENT)
Dept: OTHER | Age: 53
Discharge: HOME | End: 2018-04-09
Attending: INTERNAL MEDICINE | Admitting: INTERNAL MEDICINE

## 2018-04-09 LAB
ALBUMIN SERPL-MCNC: 4.1 GM/DL (ref 3.4–5)
ALP BLD-CCNC: 121 IU/L (ref 40–129)
ALT SERPL-CCNC: 12 U/L (ref 10–40)
ANION GAP SERPL CALCULATED.3IONS-SCNC: 19 MMOL/L (ref 4–16)
AST SERPL-CCNC: 16 IU/L (ref 15–37)
BILIRUB SERPL-MCNC: 0.1 MG/DL (ref 0–1)
BUN BLDV-MCNC: 35 MG/DL (ref 6–23)
CALCIUM SERPL-MCNC: 9.9 MG/DL (ref 8.3–10.6)
CHLORIDE BLD-SCNC: 92 MMOL/L (ref 99–110)
CO2: 23 MMOL/L (ref 21–32)
CREAT SERPL-MCNC: 1.1 MG/DL (ref 0.6–1.1)
GFR AFRICAN AMERICAN: >60 ML/MIN/1.73M2
GFR NON-AFRICAN AMERICAN: 52 ML/MIN/1.73M2
GLUCOSE BLD-MCNC: 94 MG/DL (ref 70–99)
POTASSIUM SERPL-SCNC: 4.2 MMOL/L (ref 3.5–5.1)
SODIUM BLD-SCNC: 134 MMOL/L (ref 135–145)
T4 FREE: 1.68 NG/DL (ref 0.9–1.8)
TOTAL PROTEIN: 7.1 GM/DL (ref 6.4–8.2)
TSH HIGH SENSITIVITY: 3.13 UIU/ML (ref 0.27–4.2)

## 2018-05-16 ENCOUNTER — HOSPITAL ENCOUNTER (OUTPATIENT)
Dept: OTHER | Age: 53
Discharge: HOME | End: 2018-05-16
Attending: INTERNAL MEDICINE | Admitting: INTERNAL MEDICINE

## 2018-05-16 LAB
ALBUMIN SERPL-MCNC: 3.2 GM/DL (ref 3.4–5)
ALP BLD-CCNC: 116 IU/L (ref 40–128)
ALT SERPL-CCNC: 10 U/L (ref 10–40)
ANION GAP SERPL CALCULATED.3IONS-SCNC: 14 MMOL/L (ref 4–16)
AST SERPL-CCNC: 13 IU/L (ref 15–37)
BILIRUB SERPL-MCNC: 0.2 MG/DL (ref 0–1)
BUN BLDV-MCNC: 9 MG/DL (ref 6–23)
CALCIUM SERPL-MCNC: 9 MG/DL (ref 8.3–10.6)
CHLORIDE BLD-SCNC: 99 MMOL/L (ref 99–110)
CO2: 24 MMOL/L (ref 21–32)
CREAT SERPL-MCNC: 0.7 MG/DL (ref 0.6–1.1)
GFR AFRICAN AMERICAN: >60 ML/MIN/1.73M2
GFR NON-AFRICAN AMERICAN: >60 ML/MIN/1.73M2
GLUCOSE BLD-MCNC: 111 MG/DL (ref 70–99)
POTASSIUM SERPL-SCNC: 3.5 MMOL/L (ref 3.5–5.1)
SODIUM BLD-SCNC: 137 MMOL/L (ref 135–145)
T4 FREE: 1.41 NG/DL (ref 0.9–1.8)
TOTAL PROTEIN: 6.2 GM/DL (ref 6.4–8.2)
TSH HIGH SENSITIVITY: 1.24 UIU/ML (ref 0.27–4.2)

## 2018-06-13 ENCOUNTER — HOSPITAL ENCOUNTER (OUTPATIENT)
Dept: OTHER | Age: 53
Discharge: HOME | End: 2018-06-13
Attending: INTERNAL MEDICINE | Admitting: INTERNAL MEDICINE

## 2018-06-13 LAB
ALBUMIN SERPL-MCNC: 3.2 GM/DL (ref 3.4–5)
ALP BLD-CCNC: 133 IU/L (ref 40–129)
ALT SERPL-CCNC: 12 U/L (ref 10–40)
ANION GAP SERPL CALCULATED.3IONS-SCNC: 14 MMOL/L (ref 4–16)
AST SERPL-CCNC: 16 IU/L (ref 15–37)
BILIRUB SERPL-MCNC: 0.2 MG/DL (ref 0–1)
BUN BLDV-MCNC: 17 MG/DL (ref 6–23)
CALCIUM SERPL-MCNC: 8.6 MG/DL (ref 8.3–10.6)
CHLORIDE BLD-SCNC: 97 MMOL/L (ref 99–110)
CO2: 25 MMOL/L (ref 21–32)
CREAT SERPL-MCNC: 0.8 MG/DL (ref 0.6–1.1)
GFR AFRICAN AMERICAN: >60 ML/MIN/1.73M2
GFR NON-AFRICAN AMERICAN: >60 ML/MIN/1.73M2
GLUCOSE BLD-MCNC: 110 MG/DL (ref 70–99)
POTASSIUM SERPL-SCNC: 3.3 MMOL/L (ref 3.5–5.1)
SODIUM BLD-SCNC: 136 MMOL/L (ref 135–145)
T4 FREE: 1.34 NG/DL (ref 0.9–1.8)
TOTAL PROTEIN: 6.5 GM/DL (ref 6.4–8.2)
TSH HIGH SENSITIVITY: 1.89 UIU/ML (ref 0.27–4.2)

## 2018-06-18 ENCOUNTER — HOSPITAL ENCOUNTER (OUTPATIENT)
Dept: SURGERY | Age: 53
Discharge: HOME | End: 2018-06-18
Attending: INTERNAL MEDICINE | Admitting: INTERNAL MEDICINE

## 2018-06-18 VITALS
WEIGHT: 99 LBS | RESPIRATION RATE: 16 BRPM | HEIGHT: 66 IN | HEART RATE: 77 BPM | SYSTOLIC BLOOD PRESSURE: 124 MMHG | TEMPERATURE: 97.5 F | DIASTOLIC BLOOD PRESSURE: 87 MMHG | BODY MASS INDEX: 15.91 KG/M2 | OXYGEN SATURATION: 96 %

## 2018-06-18 RX ORDER — SODIUM CHLORIDE, SODIUM LACTATE, POTASSIUM CHLORIDE, CALCIUM CHLORIDE 600; 310; 30; 20 MG/100ML; MG/100ML; MG/100ML; MG/100ML
INJECTION, SOLUTION INTRAVENOUS CONTINUOUS
Status: DISCONTINUED | OUTPATIENT
Start: 2018-06-18 | End: 2018-06-19 | Stop reason: HOSPADM

## 2018-06-18 RX ADMIN — SODIUM CHLORIDE, SODIUM LACTATE, POTASSIUM CHLORIDE, CALCIUM CHLORIDE: 600; 310; 30; 20 INJECTION, SOLUTION INTRAVENOUS at 10:16

## 2018-06-18 ASSESSMENT — PAIN SCALES - GENERAL
PAINLEVEL_OUTOF10: 0
PAINLEVEL_OUTOF10: 0

## 2018-06-18 ASSESSMENT — PAIN - FUNCTIONAL ASSESSMENT: PAIN_FUNCTIONAL_ASSESSMENT: 0-10

## 2018-07-16 ENCOUNTER — HOSPITAL ENCOUNTER (OUTPATIENT)
Dept: OTHER | Age: 53
Discharge: HOME | End: 2018-07-16
Attending: INTERNAL MEDICINE | Admitting: INTERNAL MEDICINE

## 2018-07-16 LAB
ALBUMIN SERPL-MCNC: 3.2 GM/DL (ref 3.4–5)
ALP BLD-CCNC: 111 IU/L (ref 40–129)
ALT SERPL-CCNC: 20 U/L (ref 10–40)
ANION GAP SERPL CALCULATED.3IONS-SCNC: 14 MMOL/L (ref 4–16)
AST SERPL-CCNC: 26 IU/L (ref 15–37)
BILIRUB SERPL-MCNC: 0.2 MG/DL (ref 0–1)
BUN BLDV-MCNC: 11 MG/DL (ref 6–23)
CALCIUM SERPL-MCNC: 8.6 MG/DL (ref 8.3–10.6)
CHLORIDE BLD-SCNC: 103 MMOL/L (ref 99–110)
CO2: 23 MMOL/L (ref 21–32)
CREAT SERPL-MCNC: 0.8 MG/DL (ref 0.6–1.1)
GFR AFRICAN AMERICAN: >60 ML/MIN/1.73M2
GFR NON-AFRICAN AMERICAN: >60 ML/MIN/1.73M2
GLUCOSE BLD-MCNC: 97 MG/DL (ref 70–99)
POTASSIUM SERPL-SCNC: 3.6 MMOL/L (ref 3.5–5.1)
SODIUM BLD-SCNC: 140 MMOL/L (ref 135–145)
T4 FREE: 1.1 NG/DL (ref 0.9–1.8)
TOTAL PROTEIN: 6 GM/DL (ref 6.4–8.2)
TSH HIGH SENSITIVITY: 1.18 UIU/ML (ref 0.27–4.2)

## 2018-08-27 ENCOUNTER — HOSPITAL ENCOUNTER (OUTPATIENT)
Dept: OTHER | Age: 53
Discharge: HOME | End: 2018-08-27
Attending: INTERNAL MEDICINE | Admitting: INTERNAL MEDICINE

## 2018-08-27 LAB
ALBUMIN SERPL-MCNC: 4 GM/DL (ref 3.4–5)
ALP BLD-CCNC: 118 IU/L (ref 40–129)
ALT SERPL-CCNC: 27 U/L (ref 10–40)
ANION GAP SERPL CALCULATED.3IONS-SCNC: 13 MMOL/L (ref 4–16)
AST SERPL-CCNC: 18 IU/L (ref 15–37)
BILIRUB SERPL-MCNC: 0.3 MG/DL (ref 0–1)
BUN BLDV-MCNC: 13 MG/DL (ref 6–23)
CALCIUM SERPL-MCNC: 10 MG/DL (ref 8.3–10.6)
CHLORIDE BLD-SCNC: 103 MMOL/L (ref 99–110)
CO2: 22 MMOL/L (ref 21–32)
CREAT SERPL-MCNC: 0.7 MG/DL (ref 0.6–1.1)
GFR AFRICAN AMERICAN: >60 ML/MIN/1.73M2
GFR NON-AFRICAN AMERICAN: >60 ML/MIN/1.73M2
GLUCOSE BLD-MCNC: 81 MG/DL (ref 70–99)
POTASSIUM SERPL-SCNC: 4.6 MMOL/L (ref 3.5–5.1)
SODIUM BLD-SCNC: 138 MMOL/L (ref 135–145)
T4 FREE: 1.08 NG/DL (ref 0.9–1.8)
TOTAL PROTEIN: 6.9 GM/DL (ref 6.4–8.2)
TSH HIGH SENSITIVITY: 1.31 UIU/ML (ref 0.27–4.2)

## 2018-10-02 ENCOUNTER — HOSPITAL ENCOUNTER (OUTPATIENT)
Age: 53
Setting detail: SPECIMEN
Discharge: HOME | End: 2018-10-02
Payer: MEDICARE

## 2018-10-02 LAB
ALBUMIN SERPL-MCNC: 4.1 GM/DL (ref 3.4–5)
ALP BLD-CCNC: 151 IU/L (ref 40–128)
ALT SERPL-CCNC: 17 U/L (ref 10–40)
ANION GAP SERPL CALCULATED.3IONS-SCNC: 16 MMOL/L (ref 4–16)
AST SERPL-CCNC: 16 IU/L (ref 15–37)
BILIRUB SERPL-MCNC: 0.2 MG/DL (ref 0–1)
BUN BLDV-MCNC: 17 MG/DL (ref 6–23)
CALCIUM SERPL-MCNC: 9.7 MG/DL (ref 8.3–10.6)
CHLORIDE BLD-SCNC: 97 MMOL/L (ref 99–110)
CO2: 23 MMOL/L (ref 21–32)
CREAT SERPL-MCNC: 0.7 MG/DL (ref 0.6–1.1)
GFR AFRICAN AMERICAN: >60 ML/MIN/1.73M2
GFR NON-AFRICAN AMERICAN: >60 ML/MIN/1.73M2
GLUCOSE BLD-MCNC: 148 MG/DL (ref 70–99)
POTASSIUM SERPL-SCNC: 3.8 MMOL/L (ref 3.5–5.1)
SODIUM BLD-SCNC: 136 MMOL/L (ref 135–145)
T4 FREE: 1.23 NG/DL (ref 0.9–1.8)
TOTAL PROTEIN: 7.6 GM/DL (ref 6.4–8.2)
TSH HIGH SENSITIVITY: 3.03 UIU/ML (ref 0.27–4.2)

## 2018-10-02 PROCEDURE — 84439 ASSAY OF FREE THYROXINE: CPT

## 2018-10-02 PROCEDURE — 80053 COMPREHEN METABOLIC PANEL: CPT

## 2018-10-02 PROCEDURE — 84443 ASSAY THYROID STIM HORMONE: CPT

## 2018-11-08 ENCOUNTER — HOSPITAL ENCOUNTER (OUTPATIENT)
Age: 53
Setting detail: SPECIMEN
Discharge: HOME | End: 2018-11-08
Payer: MEDICARE

## 2018-11-08 LAB
ALBUMIN SERPL-MCNC: 4.2 GM/DL (ref 3.4–5)
ALP BLD-CCNC: 104 IU/L (ref 40–129)
ALT SERPL-CCNC: 12 U/L (ref 10–40)
ANION GAP SERPL CALCULATED.3IONS-SCNC: 14 MMOL/L (ref 4–16)
AST SERPL-CCNC: 15 IU/L (ref 15–37)
BILIRUB SERPL-MCNC: 0.2 MG/DL (ref 0–1)
BUN BLDV-MCNC: 17 MG/DL (ref 6–23)
CALCIUM SERPL-MCNC: 9.3 MG/DL (ref 8.3–10.6)
CHLORIDE BLD-SCNC: 103 MMOL/L (ref 99–110)
CO2: 23 MMOL/L (ref 21–32)
CREAT SERPL-MCNC: 0.9 MG/DL (ref 0.6–1.1)
GFR AFRICAN AMERICAN: >60 ML/MIN/1.73M2
GFR NON-AFRICAN AMERICAN: >60 ML/MIN/1.73M2
GLUCOSE BLD-MCNC: 96 MG/DL (ref 70–99)
POTASSIUM SERPL-SCNC: 4 MMOL/L (ref 3.5–5.1)
SODIUM BLD-SCNC: 140 MMOL/L (ref 135–145)
T4 FREE: 1.21 NG/DL (ref 0.9–1.8)
TOTAL PROTEIN: 7.4 GM/DL (ref 6.4–8.2)
TSH HIGH SENSITIVITY: 2.13 UIU/ML (ref 0.27–4.2)

## 2018-11-08 PROCEDURE — 84439 ASSAY OF FREE THYROXINE: CPT

## 2018-11-08 PROCEDURE — 84443 ASSAY THYROID STIM HORMONE: CPT

## 2018-11-08 PROCEDURE — 80053 COMPREHEN METABOLIC PANEL: CPT

## 2018-11-29 ENCOUNTER — HOSPITAL ENCOUNTER (OUTPATIENT)
Age: 53
Setting detail: SPECIMEN
Discharge: HOME | End: 2018-11-29
Payer: MEDICARE

## 2018-11-29 LAB
ALBUMIN SERPL-MCNC: 4.3 GM/DL (ref 3.4–5)
ALP BLD-CCNC: 117 IU/L (ref 40–129)
ALT SERPL-CCNC: 35 U/L (ref 10–40)
ANION GAP SERPL CALCULATED.3IONS-SCNC: 13 MMOL/L (ref 4–16)
AST SERPL-CCNC: 31 IU/L (ref 15–37)
BILIRUB SERPL-MCNC: 0.3 MG/DL (ref 0–1)
BUN BLDV-MCNC: 13 MG/DL (ref 6–23)
CALCIUM SERPL-MCNC: 9.2 MG/DL (ref 8.3–10.6)
CHLORIDE BLD-SCNC: 100 MMOL/L (ref 99–110)
CO2: 24 MMOL/L (ref 21–32)
CREAT SERPL-MCNC: 0.7 MG/DL (ref 0.6–1.1)
GFR AFRICAN AMERICAN: >60 ML/MIN/1.73M2
GFR NON-AFRICAN AMERICAN: >60 ML/MIN/1.73M2
GLUCOSE BLD-MCNC: 83 MG/DL (ref 70–99)
POTASSIUM SERPL-SCNC: 4.2 MMOL/L (ref 3.5–5.1)
SODIUM BLD-SCNC: 137 MMOL/L (ref 135–145)
T4 FREE: 0.99 NG/DL (ref 0.9–1.8)
TOTAL PROTEIN: 7.2 GM/DL (ref 6.4–8.2)
TSH HIGH SENSITIVITY: 6.25 UIU/ML (ref 0.27–4.2)

## 2018-11-29 PROCEDURE — 80053 COMPREHEN METABOLIC PANEL: CPT

## 2018-11-29 PROCEDURE — 84443 ASSAY THYROID STIM HORMONE: CPT

## 2018-11-29 PROCEDURE — 84439 ASSAY OF FREE THYROXINE: CPT

## 2019-02-15 PROBLEM — R41.82 ALTERED MENTAL STATUS: Status: ACTIVE | Noted: 2019-02-15

## 2019-02-16 ENCOUNTER — APPOINTMENT (OUTPATIENT)
Dept: GENERAL RADIOLOGY | Age: 54
DRG: 917 | End: 2019-02-16
Attending: INTERNAL MEDICINE
Payer: MEDICARE

## 2019-02-16 ENCOUNTER — APPOINTMENT (OUTPATIENT)
Dept: INTERVENTIONAL RADIOLOGY/VASCULAR | Age: 54
DRG: 917 | End: 2019-02-16
Attending: INTERNAL MEDICINE
Payer: MEDICARE

## 2019-02-16 ENCOUNTER — APPOINTMENT (OUTPATIENT)
Dept: CT IMAGING | Age: 54
DRG: 917 | End: 2019-02-16
Attending: INTERNAL MEDICINE
Payer: MEDICARE

## 2019-02-16 ENCOUNTER — HOSPITAL ENCOUNTER (INPATIENT)
Age: 54
LOS: 2 days | Discharge: HOME HEALTH | DRG: 917 | End: 2019-02-18
Attending: INTERNAL MEDICINE | Admitting: INTERNAL MEDICINE
Payer: MEDICARE

## 2019-02-16 ENCOUNTER — APPOINTMENT (OUTPATIENT)
Dept: MRI IMAGING | Age: 54
DRG: 917 | End: 2019-02-16
Attending: INTERNAL MEDICINE
Payer: MEDICARE

## 2019-02-16 LAB
ANION GAP SERPL CALCULATED.3IONS-SCNC: 16 MEQ/L (ref 8–16)
BACTERIA: ABNORMAL
BASOPHILS # BLD: 0.4 %
BASOPHILS ABSOLUTE: 0.1 THOU/MM3 (ref 0–0.1)
BILIRUBIN URINE: ABNORMAL
BLOOD, URINE: NEGATIVE
BUN BLDV-MCNC: 21 MG/DL (ref 7–22)
CALCIUM SERPL-MCNC: 8.8 MG/DL (ref 8.5–10.5)
CASTS: ABNORMAL /LPF
CASTS: ABNORMAL /LPF
CHARACTER, URINE: CLEAR
CHLORIDE BLD-SCNC: 106 MEQ/L (ref 98–111)
CO2: 22 MEQ/L (ref 23–33)
COLOR: YELLOW
CREAT SERPL-MCNC: 0.8 MG/DL (ref 0.4–1.2)
CRYSTALS: ABNORMAL
EOSINOPHIL # BLD: 1.2 %
EOSINOPHILS ABSOLUTE: 0.2 THOU/MM3 (ref 0–0.4)
EPITHELIAL CELLS, UA: ABNORMAL /HPF
ERYTHROCYTE [DISTWIDTH] IN BLOOD BY AUTOMATED COUNT: 13.4 % (ref 11.5–14.5)
ERYTHROCYTE [DISTWIDTH] IN BLOOD BY AUTOMATED COUNT: 43.5 FL (ref 35–45)
GFR SERPL CREATININE-BSD FRML MDRD: 74 ML/MIN/1.73M2
GLUCOSE BLD-MCNC: 80 MG/DL (ref 70–108)
GLUCOSE, URINE: NEGATIVE MG/DL
HCT VFR BLD CALC: 37.5 % (ref 37–47)
HEMOGLOBIN: 12.3 GM/DL (ref 12–16)
ICTOTEST: NEGATIVE
IMMATURE GRANS (ABS): 0.03 THOU/MM3 (ref 0–0.07)
IMMATURE GRANULOCYTES: 0.2 %
KETONES, URINE: NEGATIVE
LEUKOCYTE EST, POC: NEGATIVE
LYMPHOCYTES # BLD: 29.1 %
LYMPHOCYTES ABSOLUTE: 3.8 THOU/MM3 (ref 1–4.8)
MAGNESIUM: 2.2 MG/DL (ref 1.6–2.4)
MCH RBC QN AUTO: 29.1 PG (ref 26–33)
MCHC RBC AUTO-ENTMCNC: 32.8 GM/DL (ref 32.2–35.5)
MCV RBC AUTO: 88.7 FL (ref 81–99)
MISCELLANEOUS LAB TEST RESULT: ABNORMAL
MONOCYTES # BLD: 7.8 %
MONOCYTES ABSOLUTE: 1 THOU/MM3 (ref 0.4–1.3)
NITRITE, URINE: NEGATIVE
NUCLEATED RED BLOOD CELLS: 0 /100 WBC
PH UA: 6
PLATELET # BLD: 367 THOU/MM3 (ref 130–400)
PMV BLD AUTO: 9.1 FL (ref 9.4–12.4)
POTASSIUM REFLEX MAGNESIUM: 3.4 MEQ/L (ref 3.5–5.2)
PROTEIN UA: ABNORMAL MG/DL
RBC # BLD: 4.23 MILL/MM3 (ref 4.2–5.4)
RBC URINE: ABNORMAL /HPF
RENAL EPITHELIAL, UA: ABNORMAL
SEG NEUTROPHILS: 61.3 %
SEGMENTED NEUTROPHILS ABSOLUTE COUNT: 7.9 THOU/MM3 (ref 1.8–7.7)
SODIUM BLD-SCNC: 144 MEQ/L (ref 135–145)
SPECIFIC GRAVITY UA: > 1.03 (ref 1–1.03)
TOTAL CK: 312 U/L (ref 30–135)
TSH SERPL DL<=0.05 MIU/L-ACNC: 3.45 UIU/ML (ref 0.4–4.2)
UROBILINOGEN, URINE: 1 EU/DL
WBC # BLD: 12.9 THOU/MM3 (ref 4.8–10.8)
WBC UA: ABNORMAL /HPF
YEAST: ABNORMAL

## 2019-02-16 PROCEDURE — 70553 MRI BRAIN STEM W/O & W/DYE: CPT

## 2019-02-16 PROCEDURE — 83735 ASSAY OF MAGNESIUM: CPT

## 2019-02-16 PROCEDURE — 6360000002 HC RX W HCPCS: Performed by: PHYSICIAN ASSISTANT

## 2019-02-16 PROCEDURE — 99223 1ST HOSP IP/OBS HIGH 75: CPT | Performed by: PHYSICIAN ASSISTANT

## 2019-02-16 PROCEDURE — 96361 HYDRATE IV INFUSION ADD-ON: CPT

## 2019-02-16 PROCEDURE — 96360 HYDRATION IV INFUSION INIT: CPT

## 2019-02-16 PROCEDURE — 6370000000 HC RX 637 (ALT 250 FOR IP): Performed by: INTERNAL MEDICINE

## 2019-02-16 PROCEDURE — 85025 COMPLETE CBC W/AUTO DIFF WBC: CPT

## 2019-02-16 PROCEDURE — 3209999900 CT COMPARISON OF OUTSIDE FILMS

## 2019-02-16 PROCEDURE — 84443 ASSAY THYROID STIM HORMONE: CPT

## 2019-02-16 PROCEDURE — 80076 HEPATIC FUNCTION PANEL: CPT

## 2019-02-16 PROCEDURE — 2580000003 HC RX 258: Performed by: PHYSICIAN ASSISTANT

## 2019-02-16 PROCEDURE — 6370000000 HC RX 637 (ALT 250 FOR IP): Performed by: PHYSICIAN ASSISTANT

## 2019-02-16 PROCEDURE — 93880 EXTRACRANIAL BILAT STUDY: CPT

## 2019-02-16 PROCEDURE — 36415 COLL VENOUS BLD VENIPUNCTURE: CPT

## 2019-02-16 PROCEDURE — 2060000000 HC ICU INTERMEDIATE R&B

## 2019-02-16 PROCEDURE — 80048 BASIC METABOLIC PNL TOTAL CA: CPT

## 2019-02-16 PROCEDURE — 2709999900 HC NON-CHARGEABLE SUPPLY

## 2019-02-16 PROCEDURE — 99221 1ST HOSP IP/OBS SF/LOW 40: CPT | Performed by: PSYCHIATRY & NEUROLOGY

## 2019-02-16 PROCEDURE — 96372 THER/PROPH/DIAG INJ SC/IM: CPT

## 2019-02-16 PROCEDURE — 6360000004 HC RX CONTRAST MEDICATION: Performed by: PHYSICIAN ASSISTANT

## 2019-02-16 PROCEDURE — 81001 URINALYSIS AUTO W/SCOPE: CPT

## 2019-02-16 PROCEDURE — A9579 GAD-BASE MR CONTRAST NOS,1ML: HCPCS | Performed by: PHYSICIAN ASSISTANT

## 2019-02-16 PROCEDURE — 71046 X-RAY EXAM CHEST 2 VIEWS: CPT

## 2019-02-16 PROCEDURE — 82550 ASSAY OF CK (CPK): CPT

## 2019-02-16 RX ORDER — CYCLOBENZAPRINE HCL 10 MG
10 TABLET ORAL 3 TIMES DAILY PRN
Status: DISCONTINUED | OUTPATIENT
Start: 2019-02-16 | End: 2019-02-18 | Stop reason: HOSPADM

## 2019-02-16 RX ORDER — SODIUM CHLORIDE 0.9 % (FLUSH) 0.9 %
10 SYRINGE (ML) INJECTION PRN
Status: DISCONTINUED | OUTPATIENT
Start: 2019-02-16 | End: 2019-02-18 | Stop reason: HOSPADM

## 2019-02-16 RX ORDER — FERROUS SULFATE 325(65) MG
325 TABLET ORAL
COMMUNITY

## 2019-02-16 RX ORDER — 0.9 % SODIUM CHLORIDE 0.9 %
500 INTRAVENOUS SOLUTION INTRAVENOUS ONCE
Status: COMPLETED | OUTPATIENT
Start: 2019-02-17 | End: 2019-02-17

## 2019-02-16 RX ORDER — ACETAMINOPHEN 325 MG/1
650 TABLET ORAL EVERY 6 HOURS PRN
Status: DISCONTINUED | OUTPATIENT
Start: 2019-02-16 | End: 2019-02-18 | Stop reason: HOSPADM

## 2019-02-16 RX ORDER — SODIUM CHLORIDE 9 MG/ML
INJECTION, SOLUTION INTRAVENOUS CONTINUOUS
Status: DISCONTINUED | OUTPATIENT
Start: 2019-02-16 | End: 2019-02-18 | Stop reason: HOSPADM

## 2019-02-16 RX ORDER — SIMVASTATIN 40 MG
40 TABLET ORAL NIGHTLY
Status: DISCONTINUED | OUTPATIENT
Start: 2019-02-16 | End: 2019-02-18 | Stop reason: HOSPADM

## 2019-02-16 RX ORDER — TRAZODONE HYDROCHLORIDE 100 MG/1
100 TABLET ORAL NIGHTLY
Status: DISCONTINUED | OUTPATIENT
Start: 2019-02-16 | End: 2019-02-18 | Stop reason: HOSPADM

## 2019-02-16 RX ORDER — POTASSIUM CHLORIDE 20 MEQ/1
40 TABLET, EXTENDED RELEASE ORAL PRN
Status: DISCONTINUED | OUTPATIENT
Start: 2019-02-16 | End: 2019-02-18 | Stop reason: HOSPADM

## 2019-02-16 RX ORDER — FOLIC ACID 1 MG/1
1 TABLET ORAL DAILY
COMMUNITY

## 2019-02-16 RX ORDER — ONDANSETRON 2 MG/ML
4 INJECTION INTRAMUSCULAR; INTRAVENOUS EVERY 6 HOURS PRN
Status: DISCONTINUED | OUTPATIENT
Start: 2019-02-16 | End: 2019-02-18 | Stop reason: HOSPADM

## 2019-02-16 RX ORDER — POTASSIUM CHLORIDE 20MEQ/15ML
40 LIQUID (ML) ORAL PRN
Status: DISCONTINUED | OUTPATIENT
Start: 2019-02-16 | End: 2019-02-18 | Stop reason: HOSPADM

## 2019-02-16 RX ORDER — GABAPENTIN 100 MG/1
100 CAPSULE ORAL 4 TIMES DAILY
Status: DISCONTINUED | OUTPATIENT
Start: 2019-02-16 | End: 2019-02-18 | Stop reason: HOSPADM

## 2019-02-16 RX ORDER — PROMETHAZINE HYDROCHLORIDE 12.5 MG/1
12.5 TABLET ORAL EVERY 4 HOURS
COMMUNITY

## 2019-02-16 RX ORDER — SODIUM CHLORIDE 0.9 % (FLUSH) 0.9 %
10 SYRINGE (ML) INJECTION EVERY 12 HOURS SCHEDULED
Status: DISCONTINUED | OUTPATIENT
Start: 2019-02-16 | End: 2019-02-18 | Stop reason: HOSPADM

## 2019-02-16 RX ORDER — ASPIRIN AND DIPYRIDAMOLE 25; 200 MG/1; MG/1
1 CAPSULE, EXTENDED RELEASE ORAL 2 TIMES DAILY
Status: DISCONTINUED | OUTPATIENT
Start: 2019-02-16 | End: 2019-02-16

## 2019-02-16 RX ORDER — POTASSIUM CHLORIDE 7.45 MG/ML
10 INJECTION INTRAVENOUS PRN
Status: DISCONTINUED | OUTPATIENT
Start: 2019-02-16 | End: 2019-02-18 | Stop reason: HOSPADM

## 2019-02-16 RX ADMIN — GABAPENTIN 100 MG: 100 CAPSULE ORAL at 10:47

## 2019-02-16 RX ADMIN — TRAZODONE HYDROCHLORIDE 100 MG: 100 TABLET ORAL at 20:45

## 2019-02-16 RX ADMIN — SIMVASTATIN 40 MG: 40 TABLET, FILM COATED ORAL at 20:45

## 2019-02-16 RX ADMIN — GABAPENTIN 100 MG: 100 CAPSULE ORAL at 20:45

## 2019-02-16 RX ADMIN — ACETAMINOPHEN 650 MG: 325 TABLET ORAL at 20:45

## 2019-02-16 RX ADMIN — ENOXAPARIN SODIUM 40 MG: 40 INJECTION SUBCUTANEOUS at 10:48

## 2019-02-16 RX ADMIN — GABAPENTIN 100 MG: 100 CAPSULE ORAL at 15:22

## 2019-02-16 RX ADMIN — SERTRALINE 50 MG: 50 TABLET, FILM COATED ORAL at 20:45

## 2019-02-16 RX ADMIN — SODIUM CHLORIDE: 9 INJECTION, SOLUTION INTRAVENOUS at 23:26

## 2019-02-16 RX ADMIN — GADOTERIDOL 10 ML: 279.3 INJECTION, SOLUTION INTRAVENOUS at 14:17

## 2019-02-16 RX ADMIN — SERTRALINE 50 MG: 50 TABLET, FILM COATED ORAL at 15:22

## 2019-02-16 RX ADMIN — SODIUM CHLORIDE: 9 INJECTION, SOLUTION INTRAVENOUS at 06:30

## 2019-02-16 RX ADMIN — SERTRALINE 50 MG: 50 TABLET, FILM COATED ORAL at 10:48

## 2019-02-16 RX ADMIN — POTASSIUM CHLORIDE 40 MEQ: 20 TABLET, EXTENDED RELEASE ORAL at 10:48

## 2019-02-16 RX ADMIN — CYCLOBENZAPRINE HYDROCHLORIDE 10 MG: 10 TABLET, FILM COATED ORAL at 15:22

## 2019-02-16 ASSESSMENT — PAIN SCALES - GENERAL
PAINLEVEL_OUTOF10: 8
PAINLEVEL_OUTOF10: 5
PAINLEVEL_OUTOF10: 0

## 2019-02-16 ASSESSMENT — PAIN DESCRIPTION - PAIN TYPE: TYPE: ACUTE PAIN

## 2019-02-16 ASSESSMENT — PAIN SCALES - WONG BAKER
WONGBAKER_NUMERICALRESPONSE: 0
WONGBAKER_NUMERICALRESPONSE: 0

## 2019-02-16 ASSESSMENT — PAIN DESCRIPTION - ONSET: ONSET: GRADUAL

## 2019-02-16 ASSESSMENT — PAIN DESCRIPTION - DESCRIPTORS: DESCRIPTORS: HEADACHE

## 2019-02-16 ASSESSMENT — PAIN DESCRIPTION - PROGRESSION: CLINICAL_PROGRESSION: GRADUALLY IMPROVING

## 2019-02-16 ASSESSMENT — PAIN DESCRIPTION - LOCATION: LOCATION: HEAD

## 2019-02-16 ASSESSMENT — PAIN DESCRIPTION - FREQUENCY: FREQUENCY: INTERMITTENT

## 2019-02-17 LAB
ALBUMIN SERPL-MCNC: 3.2 G/DL (ref 3.5–5.1)
ALP BLD-CCNC: 84 U/L (ref 38–126)
ALT SERPL-CCNC: 16 U/L (ref 11–66)
ANION GAP SERPL CALCULATED.3IONS-SCNC: 10 MEQ/L (ref 8–16)
AST SERPL-CCNC: 16 U/L (ref 5–40)
BASOPHILS # BLD: 0.4 %
BASOPHILS ABSOLUTE: 0 THOU/MM3 (ref 0–0.1)
BILIRUB SERPL-MCNC: 0.2 MG/DL (ref 0.3–1.2)
BILIRUBIN DIRECT: < 0.2 MG/DL (ref 0–0.3)
BUN BLDV-MCNC: 18 MG/DL (ref 7–22)
CALCIUM SERPL-MCNC: 7.9 MG/DL (ref 8.5–10.5)
CHLORIDE BLD-SCNC: 110 MEQ/L (ref 98–111)
CO2: 22 MEQ/L (ref 23–33)
CREAT SERPL-MCNC: 0.8 MG/DL (ref 0.4–1.2)
EOSINOPHIL # BLD: 1.9 %
EOSINOPHILS ABSOLUTE: 0.2 THOU/MM3 (ref 0–0.4)
ERYTHROCYTE [DISTWIDTH] IN BLOOD BY AUTOMATED COUNT: 13.7 % (ref 11.5–14.5)
ERYTHROCYTE [DISTWIDTH] IN BLOOD BY AUTOMATED COUNT: 45.3 FL (ref 35–45)
GFR SERPL CREATININE-BSD FRML MDRD: 74 ML/MIN/1.73M2
GLUCOSE BLD-MCNC: 88 MG/DL (ref 70–108)
HCT VFR BLD CALC: 35.3 % (ref 37–47)
HEMOGLOBIN: 11.3 GM/DL (ref 12–16)
IMMATURE GRANS (ABS): 0.04 THOU/MM3 (ref 0–0.07)
IMMATURE GRANULOCYTES: 0.4 %
LYMPHOCYTES # BLD: 41.4 %
LYMPHOCYTES ABSOLUTE: 4.7 THOU/MM3 (ref 1–4.8)
MAGNESIUM: 2.1 MG/DL (ref 1.6–2.4)
MCH RBC QN AUTO: 29.1 PG (ref 26–33)
MCHC RBC AUTO-ENTMCNC: 32 GM/DL (ref 32.2–35.5)
MCV RBC AUTO: 91 FL (ref 81–99)
MONOCYTES # BLD: 5.9 %
MONOCYTES ABSOLUTE: 0.7 THOU/MM3 (ref 0.4–1.3)
NUCLEATED RED BLOOD CELLS: 0 /100 WBC
PHOSPHORUS: 3 MG/DL (ref 2.4–4.7)
PLATELET # BLD: 316 THOU/MM3 (ref 130–400)
PMV BLD AUTO: 8.8 FL (ref 9.4–12.4)
POTASSIUM SERPL-SCNC: 4 MEQ/L (ref 3.5–5.2)
RBC # BLD: 3.88 MILL/MM3 (ref 4.2–5.4)
SEG NEUTROPHILS: 50 %
SEGMENTED NEUTROPHILS ABSOLUTE COUNT: 5.7 THOU/MM3 (ref 1.8–7.7)
SODIUM BLD-SCNC: 142 MEQ/L (ref 135–145)
TOTAL PROTEIN: 5.4 G/DL (ref 6.1–8)
WBC # BLD: 11.4 THOU/MM3 (ref 4.8–10.8)

## 2019-02-17 PROCEDURE — 96361 HYDRATE IV INFUSION ADD-ON: CPT

## 2019-02-17 PROCEDURE — 6360000002 HC RX W HCPCS: Performed by: PHYSICIAN ASSISTANT

## 2019-02-17 PROCEDURE — 6370000000 HC RX 637 (ALT 250 FOR IP): Performed by: PHYSICIAN ASSISTANT

## 2019-02-17 PROCEDURE — 80048 BASIC METABOLIC PNL TOTAL CA: CPT

## 2019-02-17 PROCEDURE — 6370000000 HC RX 637 (ALT 250 FOR IP): Performed by: INTERNAL MEDICINE

## 2019-02-17 PROCEDURE — 83735 ASSAY OF MAGNESIUM: CPT

## 2019-02-17 PROCEDURE — 36415 COLL VENOUS BLD VENIPUNCTURE: CPT

## 2019-02-17 PROCEDURE — 2580000003 HC RX 258: Performed by: PHYSICIAN ASSISTANT

## 2019-02-17 PROCEDURE — 2709999900 HC NON-CHARGEABLE SUPPLY

## 2019-02-17 PROCEDURE — 2060000000 HC ICU INTERMEDIATE R&B

## 2019-02-17 PROCEDURE — 96372 THER/PROPH/DIAG INJ SC/IM: CPT

## 2019-02-17 PROCEDURE — 99232 SBSQ HOSP IP/OBS MODERATE 35: CPT | Performed by: INTERNAL MEDICINE

## 2019-02-17 PROCEDURE — 84100 ASSAY OF PHOSPHORUS: CPT

## 2019-02-17 PROCEDURE — 85025 COMPLETE CBC W/AUTO DIFF WBC: CPT

## 2019-02-17 RX ADMIN — Medication 10 ML: at 21:30

## 2019-02-17 RX ADMIN — SERTRALINE 50 MG: 50 TABLET, FILM COATED ORAL at 21:27

## 2019-02-17 RX ADMIN — SODIUM CHLORIDE 500 ML: 9 INJECTION, SOLUTION INTRAVENOUS at 00:27

## 2019-02-17 RX ADMIN — SIMVASTATIN 40 MG: 40 TABLET, FILM COATED ORAL at 21:27

## 2019-02-17 RX ADMIN — SODIUM CHLORIDE: 9 INJECTION, SOLUTION INTRAVENOUS at 21:30

## 2019-02-17 RX ADMIN — PERMETHRIN: 1 LOTION TOPICAL at 15:59

## 2019-02-17 RX ADMIN — SERTRALINE 50 MG: 50 TABLET, FILM COATED ORAL at 14:40

## 2019-02-17 RX ADMIN — SERTRALINE 50 MG: 50 TABLET, FILM COATED ORAL at 08:25

## 2019-02-17 RX ADMIN — GABAPENTIN 100 MG: 100 CAPSULE ORAL at 14:40

## 2019-02-17 RX ADMIN — ACETAMINOPHEN 650 MG: 325 TABLET ORAL at 14:40

## 2019-02-17 RX ADMIN — ACETAMINOPHEN 650 MG: 325 TABLET ORAL at 08:25

## 2019-02-17 RX ADMIN — CYCLOBENZAPRINE HYDROCHLORIDE 10 MG: 10 TABLET, FILM COATED ORAL at 14:40

## 2019-02-17 RX ADMIN — GABAPENTIN 100 MG: 100 CAPSULE ORAL at 08:25

## 2019-02-17 RX ADMIN — TRAZODONE HYDROCHLORIDE 100 MG: 100 TABLET ORAL at 21:27

## 2019-02-17 RX ADMIN — GABAPENTIN 100 MG: 100 CAPSULE ORAL at 21:27

## 2019-02-17 RX ADMIN — ENOXAPARIN SODIUM 40 MG: 40 INJECTION SUBCUTANEOUS at 08:25

## 2019-02-17 RX ADMIN — MAGNESIUM HYDROXIDE 30 ML: 400 SUSPENSION ORAL at 08:25

## 2019-02-17 ASSESSMENT — PAIN DESCRIPTION - PROGRESSION: CLINICAL_PROGRESSION: GRADUALLY IMPROVING

## 2019-02-17 ASSESSMENT — PAIN DESCRIPTION - ONSET: ONSET: GRADUAL

## 2019-02-17 ASSESSMENT — PAIN DESCRIPTION - FREQUENCY: FREQUENCY: INTERMITTENT

## 2019-02-17 ASSESSMENT — PAIN DESCRIPTION - DESCRIPTORS: DESCRIPTORS: HEADACHE

## 2019-02-17 ASSESSMENT — PAIN DESCRIPTION - LOCATION: LOCATION: HEAD

## 2019-02-17 ASSESSMENT — PAIN SCALES - GENERAL
PAINLEVEL_OUTOF10: 7
PAINLEVEL_OUTOF10: 0
PAINLEVEL_OUTOF10: 7
PAINLEVEL_OUTOF10: 7
PAINLEVEL_OUTOF10: 8

## 2019-02-17 ASSESSMENT — PAIN DESCRIPTION - PAIN TYPE: TYPE: ACUTE PAIN

## 2019-02-17 NOTE — PLAN OF CARE
Problem: Suicide risk  Goal: Provide patient with safe environment  Provide patient with safe environment   Outcome: Completed Date Met: 02/17/19

## 2019-02-17 NOTE — PLAN OF CARE
Problem: Falls - Risk of:  Goal: Will remain free from falls  Will remain free from falls   Outcome: Ongoing  No falls this shift. Call light within reach, bed alarm on, frequent rounding. Problem: Risk for Impaired Skin Integrity  Goal: Tissue integrity - skin and mucous membranes  Structural intactness and normal physiological function of skin and  mucous membranes. Outcome: Ongoing  Turning patient every two hours. No new skin issues. Problem: Pain:  Goal: Pain level will decrease  Pain level will decrease   Outcome: Ongoing  Pain Assessment: 0-10  Pain Level: 5   Pain goal:  0  Is pain goal met at this time? Headache has improved with Tylenol     Additional interventions to be implemented: medications tylenol      Problem: Neurological  Goal: Maximum potential motor/sensory/cognitive function  Outcome: Ongoing  Right side weakness due to prior stroke. Patient at baseline. Problem: Cardiovascular  Goal: No DVT, peripheral vascular complications  Outcome: Ongoing  No signs of DVT. On lovenox. Goal: Hemodynamic stability  Outcome: Ongoing  BP dropped during night given fluid bolus to help improve it. Problem: Respiratory  Goal: O2 Sat > 90%  Outcome: Ongoing  O2> 90% on room air. Problem: Skin Integrity/Risk  Goal: No skin breakdown during hospitalization  Outcome: Completed Date Met: 02/16/19      Problem: Discharge Planning:  Goal: Patients continuum of care needs are met  Patients continuum of care needs are met  Outcome: Ongoing  Plan to discharge home with sister once stable. Comments: Care plan reviewed with patient. Patient verbalize understanding of the plan of care and contribute to goal setting.

## 2019-02-18 VITALS
WEIGHT: 114.1 LBS | HEART RATE: 77 BPM | DIASTOLIC BLOOD PRESSURE: 55 MMHG | SYSTOLIC BLOOD PRESSURE: 96 MMHG | BODY MASS INDEX: 20.99 KG/M2 | OXYGEN SATURATION: 97 % | HEIGHT: 62 IN | TEMPERATURE: 98.2 F | RESPIRATION RATE: 18 BRPM

## 2019-02-18 LAB
ANION GAP SERPL CALCULATED.3IONS-SCNC: 10 MEQ/L (ref 8–16)
BUN BLDV-MCNC: 17 MG/DL (ref 7–22)
CALCIUM SERPL-MCNC: 8.5 MG/DL (ref 8.5–10.5)
CHLORIDE BLD-SCNC: 105 MEQ/L (ref 98–111)
CO2: 24 MEQ/L (ref 23–33)
CREAT SERPL-MCNC: 0.9 MG/DL (ref 0.4–1.2)
GFR SERPL CREATININE-BSD FRML MDRD: 65 ML/MIN/1.73M2
GLUCOSE BLD-MCNC: 98 MG/DL (ref 70–108)
MAGNESIUM: 2.2 MG/DL (ref 1.6–2.4)
POTASSIUM SERPL-SCNC: 4.9 MEQ/L (ref 3.5–5.2)
SODIUM BLD-SCNC: 139 MEQ/L (ref 135–145)

## 2019-02-18 PROCEDURE — 97166 OT EVAL MOD COMPLEX 45 MIN: CPT

## 2019-02-18 PROCEDURE — 99238 HOSP IP/OBS DSCHRG MGMT 30/<: CPT | Performed by: INTERNAL MEDICINE

## 2019-02-18 PROCEDURE — 36415 COLL VENOUS BLD VENIPUNCTURE: CPT

## 2019-02-18 PROCEDURE — 80048 BASIC METABOLIC PNL TOTAL CA: CPT

## 2019-02-18 PROCEDURE — 97530 THERAPEUTIC ACTIVITIES: CPT

## 2019-02-18 PROCEDURE — 6370000000 HC RX 637 (ALT 250 FOR IP): Performed by: PHYSICIAN ASSISTANT

## 2019-02-18 PROCEDURE — 6370000000 HC RX 637 (ALT 250 FOR IP): Performed by: INTERNAL MEDICINE

## 2019-02-18 PROCEDURE — 83735 ASSAY OF MAGNESIUM: CPT

## 2019-02-18 PROCEDURE — 96372 THER/PROPH/DIAG INJ SC/IM: CPT

## 2019-02-18 PROCEDURE — 6360000002 HC RX W HCPCS: Performed by: PHYSICIAN ASSISTANT

## 2019-02-18 PROCEDURE — 96361 HYDRATE IV INFUSION ADD-ON: CPT

## 2019-02-18 PROCEDURE — 2709999900 HC NON-CHARGEABLE SUPPLY

## 2019-02-18 PROCEDURE — 97162 PT EVAL MOD COMPLEX 30 MIN: CPT

## 2019-02-18 PROCEDURE — 97110 THERAPEUTIC EXERCISES: CPT

## 2019-02-18 RX ADMIN — GABAPENTIN 100 MG: 100 CAPSULE ORAL at 12:48

## 2019-02-18 RX ADMIN — ENOXAPARIN SODIUM 40 MG: 40 INJECTION SUBCUTANEOUS at 09:58

## 2019-02-18 RX ADMIN — SERTRALINE 50 MG: 50 TABLET, FILM COATED ORAL at 09:58

## 2019-02-18 RX ADMIN — GABAPENTIN 100 MG: 100 CAPSULE ORAL at 09:58

## 2019-02-18 RX ADMIN — ACETAMINOPHEN 650 MG: 325 TABLET ORAL at 07:42

## 2019-02-18 RX ADMIN — ACETAMINOPHEN 650 MG: 325 TABLET ORAL at 00:01

## 2019-02-18 RX ADMIN — SERTRALINE 50 MG: 50 TABLET, FILM COATED ORAL at 14:08

## 2019-02-18 RX ADMIN — ACETAMINOPHEN 650 MG: 325 TABLET ORAL at 14:08

## 2019-02-18 ASSESSMENT — PAIN SCALES - GENERAL
PAINLEVEL_OUTOF10: 7
PAINLEVEL_OUTOF10: 0
PAINLEVEL_OUTOF10: 5
PAINLEVEL_OUTOF10: 6

## 2019-02-18 ASSESSMENT — PAIN DESCRIPTION - FREQUENCY: FREQUENCY: INTERMITTENT

## 2019-02-18 ASSESSMENT — PAIN DESCRIPTION - LOCATION
LOCATION: HEAD
LOCATION: HEAD

## 2019-02-18 ASSESSMENT — PAIN DESCRIPTION - PROGRESSION: CLINICAL_PROGRESSION: GRADUALLY IMPROVING

## 2019-02-18 ASSESSMENT — PAIN DESCRIPTION - PAIN TYPE: TYPE: ACUTE PAIN

## 2019-02-18 ASSESSMENT — PAIN DESCRIPTION - DESCRIPTORS: DESCRIPTORS: HEADACHE

## 2019-02-18 NOTE — DISCHARGE SUMMARY
Oral Oral   SpO2: 98% 96% 98% 98%   Weight:    114 lb 1.6 oz (51.8 kg)   Height:         Weight: Weight: 114 lb 1.6 oz (51.8 kg)     24 hour intake/output:  Intake/Output Summary (Last 24 hours) at 02/18/19 0940  Last data filed at 02/18/19 0600   Gross per 24 hour   Intake           1755.8 ml   Output                5 ml   Net           1750.8 ml         General appearance:  No apparent distress, appears stated age and cooperative. HEENT:  Normal cephalic, atraumatic without obvious deformity. Pupils equal, round, and reactive to light. Extra ocular muscles intact. Conjunctivae/corneas clear. Neck: Supple, with full range of motion. No jugular venous distention. Trachea midline. Respiratory:  Normal respiratory effort. Clear to auscultation, bilaterally without Rales/Wheezes/Rhonchi. Cardiovascular:  Regular rate and rhythm with normal S1/S2 without murmurs, rubs or gallops. Abdomen: Soft, non-tender, non-distended with normal bowel sounds. Musculoskeletal:  No clubbing, cyanosis or edema bilaterally. Full range of motion without deformity. Skin: Skin color, texture, turgor normal.  No rashes or lesions. Neurologic:  Neurovascularly intact without any focal sensory/motor deficits. Cranial nerves: II-XII intact, grossly non-focal.  Psychiatric:  Alert and oriented, thought content appropriate, normal insight  Capillary Refill: Brisk,< 3 seconds   Peripheral Pulses: +2 palpable, equal bilaterally       Labs:  For convenience and continuity at follow-up the following most recent labs are provided:      CBC:    Lab Results   Component Value Date    WBC 11.4 02/17/2019    HGB 11.3 02/17/2019    HCT 35.3 02/17/2019     02/17/2019       Renal:  Lab Results   Component Value Date     02/18/2019    K 4.9 02/18/2019    K 3.4 02/16/2019     02/18/2019    CO2 24 02/18/2019    BUN 17 02/18/2019    CREATININE 0.9 02/18/2019    CALCIUM 8.5 02/18/2019    PHOS 3.0 02/17/2019         Significant 10:33 AM             Consults:     IP CONSULT TO PSYCHIATRY  IP CONSULT TO SOCIAL WORK  IP CONSULT TO HOME CARE NEEDS    Disposition:    [x] Home       [] TCU       [] Rehab       [] Psych       [] SNF       [] [place]       [] Other-    Condition at Discharge: Stable    Code Status:  Limited     Patient Instructions: Activity: activity as tolerated  Diet: DIET GENERAL; Safety Tray      Follow-up visits:   No follow-up provider specified. Discharge Medications:      Ronnie Mcleod Medication Instructions NFL:[id number]    Printed on:02/18/19 9740   Medication Information                      cyclobenzaprine (FLEXERIL) 10 MG tablet  Take 10 mg by mouth 3 times daily as needed for Muscle spasms             dipyridamole-aspirin (AGGRENOX)  MG per SR capsule  Take 1 capsule by mouth 2 times daily             Ergocalciferol (VITAMIN D2 PO)  Take 50,000 Units by mouth once a week             ferrous sulfate 325 (65 Fe) MG tablet  Take 325 mg by mouth daily (with breakfast)             folic acid (FOLVITE) 1 MG tablet  Take 1 mg by mouth daily             gabapentin (NEURONTIN) 100 MG capsule  Take 100 mg by mouth 4 times daily             promethazine (PHENERGAN) 12.5 MG tablet  Take 12.5 mg by mouth every 4 hours             senna (SENOKOT) 8.6 MG tablet  Take 2 tablets by mouth daily             sertraline (ZOLOFT) 50 MG tablet  Take 50 mg by mouth three times daily 3 tabs              simvastatin (ZOCOR) 40 MG tablet  Take 40 mg by mouth nightly             traZODone (DESYREL) 50 MG tablet  Take 100 mg by mouth nightly              valACYclovir (VALTREX) 1 G tablet  Take 1,000 mg by mouth 3 times daily                 Time Spent on discharge is more than 20 minutes in the examination, evaluation, counseling and review of medications and discharge plan. Signed: Thank you DA MIRZA for the opportunity to be involved in this patient's care.     Electronically

## 2019-02-18 NOTE — CARE COORDINATION
2/18/19, 10:41 AM    Discharge plan discussed by  and . Discharge plan reviewed with patient/ family. Patient/ family verbalize understanding of discharge plan and are in agreement with plan. Understanding was demonstrated using the teach back method. Services After Discharge  Services At/After Discharge: Nursing Services, Skilled Therapy, Aide Services ([facility] [address])      Jody Juarez will be discharged to home today where she lives with her sister Miri Rodriguez and brother-in-law. She will have 24 hour care according to Miri Rodriguez for at least a week or two. [facility] for RN, PT, OT and aid. Referral made to [facility] at [facility]. She is aware patient is being discharged today.

## 2019-02-18 NOTE — PROGRESS NOTES
Patient, sister and brother all educated on discharge instructions, no questions or concerns. Patient being discharged home with sister, [facility] Quirino Jesus, PT/OT, no further needs per patient and family.
[facility] 58  INPATIENT OCCUPATIONAL THERAPY  [facility] 4A  EVALUATION    Time:  Time In: 829  Time Out: 357  Timed Code Treatment Minutes: 8 Minutes  Minutes: 23          Date: 2019  Patient Name: Remigio Loving,   Gender: female      MRN: [id number]  : 1965  (52 y.o.)  Referring Practitioner: Dr. Dhiraj Salmeron  Diagnosis: AMS  Additional Pertinent Hx: Per ER note 19: 52 y. o. female patient of DA MIRZA who presents with altered mental status from [facility] patient is nonverbal and not mobile secondary to a previous CVA and can not participate in any history gathering.  The history from ED physician and family states the patient has been having some altered mental status since intentionally taking 10 of her nieces Bipolar medications. Mary Anne Lucy family believes the patient intentionally overdosed on medication to hurt herself. Puneet Vides is concern for an additional CVA.  Patient only states \"oh God\", \"and that\" and \"no\" repeatedly. Also indicated in the records the patient has stage four lung cancer. MRI results: Large area of encephalomalacia and gliosis relating to previous left middle cerebral artery infarct.     Restrictions/Precautions:  Fall Risk            Other position/activity restrictions: aphasia       Past Medical History:   Diagnosis Date    Arthritis     Cancer ([facility] [address] 2016    lung ca- non-small cell ca-following with Dr Christi Olsen were going to do surgery , cancelled due to insurance , now going to medicate with chemo to shrink the tumor then have surgery [facility]\"    Cerebral artery occlusion with cerebral infarction [facility])     \"stroke age 37- affected speech and her right side\"    Cerebrovascular disease     Depression     Fibromyalgia     Frequent UTI     \"last one 2016\"    Goiter     Hyperlipidemia     Irritable bowel syndrome     Substance abuse ([facility] [address]     Alcholism\"quit over 15 yrs ago\"    Wears glasses      Past Surgical
Layout: One level  Home Access: Stairs to enter with rails  Entrance Stairs - Number of Steps: 3  Home Equipment: Quad cane   Bathroom Shower/Tub: Tub only (prefers to get into tub)  Bathroom Toilet: Handicap height  ADL Assistance: Needs assistance  Homemaking Responsibilities: No  Ambulation Assistance: Independent  Transfer Assistance: Independent          Additional Comments: Pt reports using quad cane at home indep. A for ADLs-unable to tell therapist by who. Objective:       RLE AROM: WFL         LLE AROM : WFL      Strength RLE: Exception  Comment: grossly 2- to 2/5    Strength LLE: WFL      Sensation  Overall Sensation Status: Impaired (expected decreased sensation in RUE)    Balance  Sitting - Static: Good  Sitting - Dynamic: Good, -  Standing - Static: Fair  Standing - Dynamic: Fair    Rolling to Left: Modified independent  Rolling to Right: Modified independent  Supine to Sit: Supervision  Sit to Supine: Supervision  Scooting: Supervision    Transfers  Sit to Stand: Stand by assistance (edge of bed)  Stand to sit: Stand by assistance       Ambulation 1  Surface: level tile  Device:  (LUE HHA)  Assistance: Stand by assistance (to CGA)  Quality of Gait: dec step length and heelstrike RLE-per pt as baseline, per pt feels min weaker than baseline but no concerns for return home  Distance: 10'x1           Exercises:  Comments: none       Activity Tolerance:  Activity Tolerance: Patient Tolerated treatment well    Treatment Initiated: sat edge of bed with S, steady. Static std around 3 min without UE support and S to SBA. Assessment:   Body structures, Functions, Activity limitations: Decreased functional mobility , Decreased balance, Decreased endurance, Decreased strength, Decreased ROM, Decreased coordination  Assessment: pt with right hemibody weaker than LLE with hx of CVA, use of cane at baseline, per pt inc generalized weakness from baseline, min dec balance, contact isolation, inc assist for
errors which are inherent in voice recognition technology. **         Final report electronically signed by Dr. Marian Cornelius on 2/16/2019 3:48 PM      VL DUP CAROTID BILATERAL   Final Result      1. RIGHT ICA . [place] Karol L[place] Karol Minimal soft plaque, no appreciable stenosis. ..    2. RIGHT ECA. . Minimal soft plaque, no appreciable stenosis. ..... [place] Karol RIGHT CCA. . Minimal soft plaque, no appreciable stenosis. ..      3. RIGHT VERTEBRAL. [place] Karol Antegrade flow . .. ... LEFT VERTEBRAL. .. Antegrade flow. ..      4. LEFT ICA. .... The proximal segment of the internal carotid artery appears occluded but low-level pulsatile antegrade flow is seen in this vessel, likely emanating from a collateral vessel. .. 5. LEFT ECA. .. The origin of the external carotid artery also appears occluded and thrombus extends distally in the external carotid artery. There is a trickle of low level pulsatile antegrade flow more distally in this vessel, however. . ..... LEFT    CCA. .. Totally occluded with thrombus. This vessel is small in caliber, suggesting chronic occlusion. .            **This report has been created using voice recognition software. It may contain minor errors which are inherent in voice recognition technology. **      Final report electronically signed by Dr. Eliceo Drake on 2/16/2019 3:18 PM      CT COMPARISON OF OUTSIDE FILMS   Final Result      XR CHEST STANDARD (2 VW)   Final Result   Probable upper lobe scarring. **This report has been created using voice recognition software. It may contain minor errors which are inherent in voice recognition technology. **      Final report electronically signed by Dr. Marian Cornelius on 2/16/2019 10:33 AM          Diet: DIET GENERAL; Safety Tray    DVT prophylaxis: [x] Lovenox                                 [] SCDs                                 [] SQ Heparin                                 [] Encourage ambulation           [] Already on Anticoagulation     Disposition:    [x] Home       []

## 2019-02-28 ENCOUNTER — HOSPITAL ENCOUNTER (OUTPATIENT)
Age: 54
Setting detail: SPECIMEN
Discharge: HOME | End: 2019-02-28
Payer: MEDICARE

## 2019-02-28 LAB
ALBUMIN SERPL-MCNC: 4.2 GM/DL (ref 3.4–5)
ALP BLD-CCNC: 124 IU/L (ref 40–128)
ALT SERPL-CCNC: 61 U/L (ref 10–40)
ANION GAP SERPL CALCULATED.3IONS-SCNC: 16 MMOL/L (ref 4–16)
AST SERPL-CCNC: 51 IU/L (ref 15–37)
BILIRUB SERPL-MCNC: 0.2 MG/DL (ref 0–1)
BUN BLDV-MCNC: 17 MG/DL (ref 6–23)
CALCIUM SERPL-MCNC: 9 MG/DL (ref 8.3–10.6)
CHLORIDE BLD-SCNC: 95 MMOL/L (ref 99–110)
CO2: 24 MMOL/L (ref 21–32)
CREAT SERPL-MCNC: 0.8 MG/DL (ref 0.6–1.1)
GFR AFRICAN AMERICAN: >60 ML/MIN/1.73M2
GFR NON-AFRICAN AMERICAN: >60 ML/MIN/1.73M2
GLUCOSE BLD-MCNC: 109 MG/DL (ref 70–99)
POTASSIUM SERPL-SCNC: 3.7 MMOL/L (ref 3.5–5.1)
SODIUM BLD-SCNC: 135 MMOL/L (ref 135–145)
T4 FREE: 1.18 NG/DL (ref 0.9–1.8)
TOTAL PROTEIN: 6.9 GM/DL (ref 6.4–8.2)
TSH HIGH SENSITIVITY: 10.44 UIU/ML (ref 0.27–4.2)

## 2019-02-28 PROCEDURE — 84439 ASSAY OF FREE THYROXINE: CPT

## 2019-02-28 PROCEDURE — 80053 COMPREHEN METABOLIC PANEL: CPT

## 2019-02-28 PROCEDURE — 84443 ASSAY THYROID STIM HORMONE: CPT

## 2019-03-21 ENCOUNTER — HOSPITAL ENCOUNTER (OUTPATIENT)
Age: 54
Setting detail: SPECIMEN
Discharge: HOME | End: 2019-03-21
Payer: MEDICARE

## 2019-03-21 LAB
ALBUMIN SERPL-MCNC: 3.7 GM/DL (ref 3.4–5)
ALP BLD-CCNC: 158 IU/L (ref 40–129)
ALT SERPL-CCNC: 59 U/L (ref 10–40)
ANION GAP SERPL CALCULATED.3IONS-SCNC: 11 MMOL/L (ref 4–16)
AST SERPL-CCNC: 35 IU/L (ref 15–37)
BILIRUB SERPL-MCNC: 0.1 MG/DL (ref 0–1)
BUN BLDV-MCNC: 28 MG/DL (ref 6–23)
CALCIUM SERPL-MCNC: 8.4 MG/DL (ref 8.3–10.6)
CHLORIDE BLD-SCNC: 102 MMOL/L (ref 99–110)
CO2: 25 MMOL/L (ref 21–32)
CREAT SERPL-MCNC: 0.8 MG/DL (ref 0.6–1.1)
GFR AFRICAN AMERICAN: >60 ML/MIN/1.73M2
GFR NON-AFRICAN AMERICAN: >60 ML/MIN/1.73M2
GLUCOSE BLD-MCNC: 122 MG/DL (ref 70–99)
POTASSIUM SERPL-SCNC: 4.3 MMOL/L (ref 3.5–5.1)
SODIUM BLD-SCNC: 138 MMOL/L (ref 135–145)
TOTAL PROTEIN: 6.5 GM/DL (ref 6.4–8.2)

## 2019-03-21 PROCEDURE — 80053 COMPREHEN METABOLIC PANEL: CPT

## 2019-05-08 ENCOUNTER — HOSPITAL ENCOUNTER (OUTPATIENT)
Age: 54
Setting detail: SPECIMEN
Discharge: HOME | End: 2019-05-08
Payer: MEDICARE

## 2019-05-08 LAB
ALBUMIN SERPL-MCNC: 3.9 GM/DL (ref 3.4–5)
ALP BLD-CCNC: 142 IU/L (ref 40–129)
ALT SERPL-CCNC: 27 U/L (ref 10–40)
ANION GAP SERPL CALCULATED.3IONS-SCNC: 11 MMOL/L (ref 4–16)
AST SERPL-CCNC: 23 IU/L (ref 15–37)
BILIRUB SERPL-MCNC: 0.1 MG/DL (ref 0–1)
BUN BLDV-MCNC: 21 MG/DL (ref 6–23)
CALCIUM SERPL-MCNC: 8.8 MG/DL (ref 8.3–10.6)
CHLORIDE BLD-SCNC: 103 MMOL/L (ref 99–110)
CO2: 24 MMOL/L (ref 21–32)
CREAT SERPL-MCNC: 0.8 MG/DL (ref 0.6–1.1)
GFR AFRICAN AMERICAN: >60 ML/MIN/1.73M2
GFR NON-AFRICAN AMERICAN: >60 ML/MIN/1.73M2
GLUCOSE BLD-MCNC: 110 MG/DL (ref 70–99)
POTASSIUM SERPL-SCNC: 4.1 MMOL/L (ref 3.5–5.1)
SODIUM BLD-SCNC: 138 MMOL/L (ref 135–145)
T4 FREE: 1.09 NG/DL (ref 0.9–1.8)
TOTAL PROTEIN: 6.8 GM/DL (ref 6.4–8.2)
TSH HIGH SENSITIVITY: 1.87 UIU/ML (ref 0.27–4.2)

## 2019-05-08 PROCEDURE — 84439 ASSAY OF FREE THYROXINE: CPT

## 2019-05-08 PROCEDURE — 80053 COMPREHEN METABOLIC PANEL: CPT

## 2019-05-08 PROCEDURE — 84443 ASSAY THYROID STIM HORMONE: CPT

## 2019-05-29 ENCOUNTER — HOSPITAL ENCOUNTER (OUTPATIENT)
Age: 54
Setting detail: SPECIMEN
Discharge: HOME | End: 2019-05-29
Payer: MEDICARE

## 2019-05-29 LAB
ALBUMIN SERPL-MCNC: 4 GM/DL (ref 3.4–5)
ALP BLD-CCNC: 141 IU/L (ref 40–128)
ALT SERPL-CCNC: 39 U/L (ref 10–40)
ANION GAP SERPL CALCULATED.3IONS-SCNC: 13 MMOL/L (ref 4–16)
AST SERPL-CCNC: 29 IU/L (ref 15–37)
BILIRUB SERPL-MCNC: 0.2 MG/DL (ref 0–1)
BUN BLDV-MCNC: 22 MG/DL (ref 6–23)
CALCIUM SERPL-MCNC: 9 MG/DL (ref 8.3–10.6)
CHLORIDE BLD-SCNC: 100 MMOL/L (ref 99–110)
CO2: 24 MMOL/L (ref 21–32)
CREAT SERPL-MCNC: 1 MG/DL (ref 0.6–1.1)
GFR AFRICAN AMERICAN: >60 ML/MIN/1.73M2
GFR NON-AFRICAN AMERICAN: 58 ML/MIN/1.73M2
GLUCOSE BLD-MCNC: 97 MG/DL (ref 70–99)
POTASSIUM SERPL-SCNC: 4.7 MMOL/L (ref 3.5–5.1)
SODIUM BLD-SCNC: 137 MMOL/L (ref 135–145)
T4 FREE: 0.82 NG/DL (ref 0.9–1.8)
TOTAL PROTEIN: 6.5 GM/DL (ref 6.4–8.2)
TSH HIGH SENSITIVITY: 2.47 UIU/ML (ref 0.27–4.2)

## 2019-05-29 PROCEDURE — 84443 ASSAY THYROID STIM HORMONE: CPT

## 2019-05-29 PROCEDURE — 80053 COMPREHEN METABOLIC PANEL: CPT

## 2019-05-29 PROCEDURE — 84439 ASSAY OF FREE THYROXINE: CPT

## 2019-06-19 ENCOUNTER — HOSPITAL ENCOUNTER (OUTPATIENT)
Age: 54
Setting detail: SPECIMEN
Discharge: HOME | End: 2019-06-19
Payer: MEDICARE

## 2019-06-19 LAB
ALBUMIN SERPL-MCNC: 4.3 GM/DL (ref 3.4–5)
ALP BLD-CCNC: 135 IU/L (ref 40–128)
ALT SERPL-CCNC: 22 U/L (ref 10–40)
ANION GAP SERPL CALCULATED.3IONS-SCNC: 11 MMOL/L (ref 4–16)
AST SERPL-CCNC: 19 IU/L (ref 15–37)
BILIRUB SERPL-MCNC: 0.3 MG/DL (ref 0–1)
BUN BLDV-MCNC: 27 MG/DL (ref 6–23)
CALCIUM SERPL-MCNC: 9.1 MG/DL (ref 8.3–10.6)
CHLORIDE BLD-SCNC: 104 MMOL/L (ref 99–110)
CO2: 22 MMOL/L (ref 21–32)
CREAT SERPL-MCNC: 0.9 MG/DL (ref 0.6–1.1)
GFR AFRICAN AMERICAN: >60 ML/MIN/1.73M2
GFR NON-AFRICAN AMERICAN: >60 ML/MIN/1.73M2
GLUCOSE BLD-MCNC: 100 MG/DL (ref 70–99)
POTASSIUM SERPL-SCNC: 4.1 MMOL/L (ref 3.5–5.1)
SODIUM BLD-SCNC: 137 MMOL/L (ref 135–145)
T4 FREE: 1.01 NG/DL (ref 0.9–1.8)
TOTAL PROTEIN: 7.2 GM/DL (ref 6.4–8.2)
TSH HIGH SENSITIVITY: 1.54 UIU/ML (ref 0.27–4.2)

## 2019-06-19 PROCEDURE — 84443 ASSAY THYROID STIM HORMONE: CPT

## 2019-06-19 PROCEDURE — 84439 ASSAY OF FREE THYROXINE: CPT

## 2019-06-19 PROCEDURE — 80053 COMPREHEN METABOLIC PANEL: CPT

## 2019-07-10 ENCOUNTER — HOSPITAL ENCOUNTER (OUTPATIENT)
Age: 54
Setting detail: SPECIMEN
Discharge: HOME | End: 2019-07-10
Payer: MEDICARE

## 2019-07-10 LAB
ALBUMIN SERPL-MCNC: 4.4 GM/DL (ref 3.4–5)
ALP BLD-CCNC: 120 IU/L (ref 40–129)
ALT SERPL-CCNC: 21 U/L (ref 10–40)
ANION GAP SERPL CALCULATED.3IONS-SCNC: 15 MMOL/L (ref 4–16)
AST SERPL-CCNC: 22 IU/L (ref 15–37)
BILIRUB SERPL-MCNC: 0.3 MG/DL (ref 0–1)
BUN BLDV-MCNC: 26 MG/DL (ref 6–23)
CALCIUM SERPL-MCNC: 9.6 MG/DL (ref 8.3–10.6)
CHLORIDE BLD-SCNC: 101 MMOL/L (ref 99–110)
CO2: 22 MMOL/L (ref 21–32)
CREAT SERPL-MCNC: 1.1 MG/DL (ref 0.6–1.1)
GFR AFRICAN AMERICAN: >60 ML/MIN/1.73M2
GFR NON-AFRICAN AMERICAN: 51 ML/MIN/1.73M2
GLUCOSE BLD-MCNC: 96 MG/DL (ref 70–99)
POTASSIUM SERPL-SCNC: 4.2 MMOL/L (ref 3.5–5.1)
SODIUM BLD-SCNC: 138 MMOL/L (ref 135–145)
TOTAL PROTEIN: 7.2 GM/DL (ref 6.4–8.2)

## 2019-07-10 PROCEDURE — 80053 COMPREHEN METABOLIC PANEL: CPT

## 2019-07-31 ENCOUNTER — HOSPITAL ENCOUNTER (OUTPATIENT)
Age: 54
Setting detail: SPECIMEN
Discharge: HOME | End: 2019-07-31
Payer: MEDICARE

## 2019-07-31 LAB
ALBUMIN SERPL-MCNC: 4.2 GM/DL (ref 3.4–5)
ALP BLD-CCNC: 108 IU/L (ref 40–128)
ALT SERPL-CCNC: 15 U/L (ref 10–40)
ANION GAP SERPL CALCULATED.3IONS-SCNC: 11 MMOL/L (ref 4–16)
AST SERPL-CCNC: 17 IU/L (ref 15–37)
BILIRUB SERPL-MCNC: 0.2 MG/DL (ref 0–1)
BUN BLDV-MCNC: 20 MG/DL (ref 6–23)
CALCIUM SERPL-MCNC: 9.3 MG/DL (ref 8.3–10.6)
CHLORIDE BLD-SCNC: 105 MMOL/L (ref 99–110)
CO2: 25 MMOL/L (ref 21–32)
CREAT SERPL-MCNC: 1 MG/DL (ref 0.6–1.1)
GFR AFRICAN AMERICAN: >60 ML/MIN/1.73M2
GFR NON-AFRICAN AMERICAN: 57 ML/MIN/1.73M2
GLUCOSE BLD-MCNC: 97 MG/DL (ref 70–99)
POTASSIUM SERPL-SCNC: 4.3 MMOL/L (ref 3.5–5.1)
SODIUM BLD-SCNC: 141 MMOL/L (ref 135–145)
TOTAL PROTEIN: 6.7 GM/DL (ref 6.4–8.2)

## 2019-07-31 PROCEDURE — 80053 COMPREHEN METABOLIC PANEL: CPT

## 2019-08-21 ENCOUNTER — HOSPITAL ENCOUNTER (OUTPATIENT)
Age: 54
Setting detail: SPECIMEN
Discharge: HOME | End: 2019-08-21
Payer: MEDICARE

## 2019-08-21 LAB
ALBUMIN SERPL-MCNC: 4.2 GM/DL (ref 3.4–5)
ALP BLD-CCNC: 135 IU/L (ref 40–129)
ALT SERPL-CCNC: 20 U/L (ref 10–40)
ANION GAP SERPL CALCULATED.3IONS-SCNC: 11 MMOL/L (ref 4–16)
AST SERPL-CCNC: 23 IU/L (ref 15–37)
BILIRUB SERPL-MCNC: 0.3 MG/DL (ref 0–1)
BUN BLDV-MCNC: 19 MG/DL (ref 6–23)
CALCIUM SERPL-MCNC: 9.3 MG/DL (ref 8.3–10.6)
CHLORIDE BLD-SCNC: 102 MMOL/L (ref 99–110)
CO2: 25 MMOL/L (ref 21–32)
CREAT SERPL-MCNC: 1.1 MG/DL (ref 0.6–1.1)
GFR AFRICAN AMERICAN: >60 ML/MIN/1.73M2
GFR NON-AFRICAN AMERICAN: 51 ML/MIN/1.73M2
GLUCOSE BLD-MCNC: 130 MG/DL (ref 70–99)
POTASSIUM SERPL-SCNC: 4.2 MMOL/L (ref 3.5–5.1)
SODIUM BLD-SCNC: 138 MMOL/L (ref 135–145)
T4 FREE: 1.01 NG/DL (ref 0.9–1.8)
TOTAL PROTEIN: 7.1 GM/DL (ref 6.4–8.2)
TSH HIGH SENSITIVITY: 2.77 UIU/ML (ref 0.27–4.2)

## 2019-08-21 PROCEDURE — 84439 ASSAY OF FREE THYROXINE: CPT

## 2019-08-21 PROCEDURE — 84443 ASSAY THYROID STIM HORMONE: CPT

## 2019-08-21 PROCEDURE — 80053 COMPREHEN METABOLIC PANEL: CPT

## 2019-09-11 ENCOUNTER — HOSPITAL ENCOUNTER (OUTPATIENT)
Age: 54
Setting detail: SPECIMEN
Discharge: HOME | End: 2019-09-11
Payer: MEDICARE

## 2019-09-11 LAB
ALBUMIN SERPL-MCNC: 4.5 GM/DL (ref 3.4–5)
ALP BLD-CCNC: 128 IU/L (ref 40–129)
ALT SERPL-CCNC: 19 U/L (ref 10–40)
ANION GAP SERPL CALCULATED.3IONS-SCNC: 15 MMOL/L (ref 4–16)
AST SERPL-CCNC: 22 IU/L (ref 15–37)
BILIRUB SERPL-MCNC: 0.2 MG/DL (ref 0–1)
BUN BLDV-MCNC: 18 MG/DL (ref 6–23)
CALCIUM SERPL-MCNC: 9.5 MG/DL (ref 8.3–10.6)
CHLORIDE BLD-SCNC: 102 MMOL/L (ref 99–110)
CO2: 23 MMOL/L (ref 21–32)
CREAT SERPL-MCNC: 1 MG/DL (ref 0.6–1.1)
GFR AFRICAN AMERICAN: >60 ML/MIN/1.73M2
GFR NON-AFRICAN AMERICAN: 57 ML/MIN/1.73M2
GLUCOSE BLD-MCNC: 136 MG/DL (ref 70–99)
POTASSIUM SERPL-SCNC: 3.9 MMOL/L (ref 3.5–5.1)
SODIUM BLD-SCNC: 140 MMOL/L (ref 135–145)
TOTAL PROTEIN: 7.4 GM/DL (ref 6.4–8.2)

## 2019-09-11 PROCEDURE — 80053 COMPREHEN METABOLIC PANEL: CPT

## 2019-10-02 ENCOUNTER — HOSPITAL ENCOUNTER (OUTPATIENT)
Age: 54
Setting detail: SPECIMEN
Discharge: HOME | End: 2019-10-02
Payer: MEDICARE

## 2019-10-02 LAB
ALBUMIN SERPL-MCNC: 4.6 GM/DL (ref 3.4–5)
ALP BLD-CCNC: 128 IU/L (ref 40–128)
ALT SERPL-CCNC: 18 U/L (ref 10–40)
ANION GAP SERPL CALCULATED.3IONS-SCNC: 14 MMOL/L (ref 4–16)
AST SERPL-CCNC: 23 IU/L (ref 15–37)
BILIRUB SERPL-MCNC: 0.2 MG/DL (ref 0–1)
BUN BLDV-MCNC: 18 MG/DL (ref 6–23)
CALCIUM SERPL-MCNC: 9.3 MG/DL (ref 8.3–10.6)
CHLORIDE BLD-SCNC: 101 MMOL/L (ref 99–110)
CO2: 26 MMOL/L (ref 21–32)
CREAT SERPL-MCNC: 1.2 MG/DL (ref 0.6–1.1)
GFR AFRICAN AMERICAN: 56 ML/MIN/1.73M2
GFR NON-AFRICAN AMERICAN: 46 ML/MIN/1.73M2
GLUCOSE BLD-MCNC: 85 MG/DL (ref 70–99)
POTASSIUM SERPL-SCNC: 4.3 MMOL/L (ref 3.5–5.1)
SODIUM BLD-SCNC: 141 MMOL/L (ref 135–145)
T4 FREE: 1.11 NG/DL (ref 0.9–1.8)
TOTAL PROTEIN: 7.3 GM/DL (ref 6.4–8.2)
TSH HIGH SENSITIVITY: 1.81 UIU/ML (ref 0.27–4.2)

## 2019-10-02 PROCEDURE — 84443 ASSAY THYROID STIM HORMONE: CPT

## 2019-10-02 PROCEDURE — 84439 ASSAY OF FREE THYROXINE: CPT

## 2019-10-02 PROCEDURE — 80053 COMPREHEN METABOLIC PANEL: CPT

## 2019-11-07 ENCOUNTER — HOSPITAL ENCOUNTER (OUTPATIENT)
Age: 54
Setting detail: SPECIMEN
Discharge: HOME | End: 2019-11-07
Payer: MEDICARE

## 2019-11-07 LAB
ALBUMIN SERPL-MCNC: 4.1 GM/DL (ref 3.4–5)
ALP BLD-CCNC: 119 IU/L (ref 40–129)
ALT SERPL-CCNC: 15 U/L (ref 10–40)
ANION GAP SERPL CALCULATED.3IONS-SCNC: 11 MMOL/L (ref 4–16)
AST SERPL-CCNC: 17 IU/L (ref 15–37)
BILIRUB SERPL-MCNC: 0.2 MG/DL (ref 0–1)
BUN BLDV-MCNC: 11 MG/DL (ref 6–23)
CALCIUM SERPL-MCNC: 8.9 MG/DL (ref 8.3–10.6)
CHLORIDE BLD-SCNC: 104 MMOL/L (ref 99–110)
CO2: 25 MMOL/L (ref 21–32)
CREAT SERPL-MCNC: 1 MG/DL (ref 0.6–1.1)
GFR AFRICAN AMERICAN: >60 ML/MIN/1.73M2
GFR NON-AFRICAN AMERICAN: 57 ML/MIN/1.73M2
GLUCOSE BLD-MCNC: 95 MG/DL (ref 70–99)
POTASSIUM SERPL-SCNC: 4 MMOL/L (ref 3.5–5.1)
SODIUM BLD-SCNC: 140 MMOL/L (ref 135–145)
T4 FREE: 0.99 NG/DL (ref 0.9–1.8)
TOTAL PROTEIN: 6.6 GM/DL (ref 6.4–8.2)
TSH HIGH SENSITIVITY: 1.62 UIU/ML (ref 0.27–4.2)

## 2019-11-07 PROCEDURE — 84443 ASSAY THYROID STIM HORMONE: CPT

## 2019-11-07 PROCEDURE — 80053 COMPREHEN METABOLIC PANEL: CPT

## 2019-11-07 PROCEDURE — 84439 ASSAY OF FREE THYROXINE: CPT

## 2019-11-27 ENCOUNTER — HOSPITAL ENCOUNTER (OUTPATIENT)
Age: 54
Setting detail: SPECIMEN
Discharge: HOME | End: 2019-11-27
Payer: MEDICARE

## 2019-11-27 LAB
ALBUMIN SERPL-MCNC: 4.4 GM/DL (ref 3.4–5)
ALP BLD-CCNC: 127 IU/L (ref 40–129)
ALT SERPL-CCNC: 16 U/L (ref 10–40)
ANION GAP SERPL CALCULATED.3IONS-SCNC: 15 MMOL/L (ref 4–16)
AST SERPL-CCNC: 19 IU/L (ref 15–37)
BILIRUB SERPL-MCNC: 0.2 MG/DL (ref 0–1)
BUN BLDV-MCNC: 22 MG/DL (ref 6–23)
CALCIUM SERPL-MCNC: 9.1 MG/DL (ref 8.3–10.6)
CHLORIDE BLD-SCNC: 103 MMOL/L (ref 99–110)
CO2: 23 MMOL/L (ref 21–32)
CREAT SERPL-MCNC: 1.2 MG/DL (ref 0.6–1.1)
GFR AFRICAN AMERICAN: 56 ML/MIN/1.73M2
GFR NON-AFRICAN AMERICAN: 46 ML/MIN/1.73M2
GLUCOSE BLD-MCNC: 90 MG/DL (ref 70–99)
POTASSIUM SERPL-SCNC: 4 MMOL/L (ref 3.5–5.1)
SODIUM BLD-SCNC: 141 MMOL/L (ref 135–145)
TOTAL PROTEIN: 6.9 GM/DL (ref 6.4–8.2)

## 2019-11-27 PROCEDURE — 80053 COMPREHEN METABOLIC PANEL: CPT

## 2020-01-30 ENCOUNTER — HOSPITAL ENCOUNTER (OUTPATIENT)
Dept: INFUSION THERAPY | Age: 55
Discharge: HOME | End: 2020-01-30
Payer: MEDICARE

## 2020-01-30 LAB
ALBUMIN SERPL-MCNC: 4.4 GM/DL (ref 3.4–5)
ALP BLD-CCNC: 152 IU/L (ref 40–129)
ALT SERPL-CCNC: 18 U/L (ref 10–40)
ANION GAP SERPL CALCULATED.3IONS-SCNC: 17 MMOL/L (ref 4–16)
AST SERPL-CCNC: 18 IU/L (ref 15–37)
BILIRUB SERPL-MCNC: 0.2 MG/DL (ref 0–1)
BUN BLDV-MCNC: 11 MG/DL (ref 6–23)
CALCIUM SERPL-MCNC: 9.1 MG/DL (ref 8.3–10.6)
CHLORIDE BLD-SCNC: 101 MMOL/L (ref 99–110)
CO2: 23 MMOL/L (ref 21–32)
CREAT SERPL-MCNC: 0.9 MG/DL (ref 0.6–1.1)
DIFFERENTIAL TYPE: ABNORMAL
GFR AFRICAN AMERICAN: >60 ML/MIN/1.73M2
GFR NON-AFRICAN AMERICAN: >60 ML/MIN/1.73M2
GLUCOSE BLD-MCNC: 103 MG/DL (ref 70–99)
HCT VFR BLD CALC: 37.9 % (ref 37–47)
HEMOGLOBIN: 12.4 GM/DL (ref 12.5–16)
LYMPHOCYTES ABSOLUTE: 2.6 K/CU MM
LYMPHOCYTES RELATIVE PERCENT: 22 % (ref 24–44)
MCH RBC QN AUTO: 28.8 PG (ref 27–31)
MCHC RBC AUTO-ENTMCNC: 32.7 % (ref 32–36)
MCV RBC AUTO: 88.1 FL (ref 78–100)
MONOCYTES ABSOLUTE: 0.9 K/CU MM
MONOCYTES RELATIVE PERCENT: 8 % (ref 0–4)
PDW BLD-RTO: 13.7 % (ref 11.7–14.9)
PLATELET # BLD: 392 K/CU MM (ref 140–440)
PMV BLD AUTO: 8.9 FL (ref 7.5–11.1)
POTASSIUM SERPL-SCNC: 4.2 MMOL/L (ref 3.5–5.1)
RBC # BLD: 4.3 M/CU MM (ref 4.2–5.4)
SEGMENTED NEUTROPHILS ABSOLUTE COUNT: 8.3 K/CU MM
SEGMENTED NEUTROPHILS RELATIVE PERCENT: 70 % (ref 36–66)
SODIUM BLD-SCNC: 141 MMOL/L (ref 135–145)
TOTAL PROTEIN: 7.6 GM/DL (ref 6.4–8.2)
WBC # BLD: 11.8 K/CU MM (ref 4–10.5)

## 2020-01-30 PROCEDURE — 99214 OFFICE O/P EST MOD 30 MIN: CPT

## 2020-01-30 PROCEDURE — 36415 COLL VENOUS BLD VENIPUNCTURE: CPT

## 2020-01-30 PROCEDURE — 80053 COMPREHEN METABOLIC PANEL: CPT

## 2020-01-30 PROCEDURE — 85025 COMPLETE CBC W/AUTO DIFF WBC: CPT

## 2020-01-30 PROCEDURE — 2580000003 HC RX 258: Performed by: INTERNAL MEDICINE

## 2020-01-30 PROCEDURE — 96413 CHEMO IV INFUSION 1 HR: CPT

## 2020-01-30 PROCEDURE — 6360000002 HC RX W HCPCS: Performed by: INTERNAL MEDICINE

## 2020-01-30 PROCEDURE — 36591 DRAW BLOOD OFF VENOUS DEVICE: CPT

## 2020-02-20 ENCOUNTER — HOSPITAL ENCOUNTER (OUTPATIENT)
Dept: INFUSION THERAPY | Age: 55
Discharge: HOME | End: 2020-02-20
Payer: MEDICARE

## 2020-02-20 LAB
ALBUMIN SERPL-MCNC: 4.2 GM/DL (ref 3.4–5)
ALP BLD-CCNC: 127 IU/L (ref 40–128)
ALT SERPL-CCNC: 13 U/L (ref 10–40)
ANION GAP SERPL CALCULATED.3IONS-SCNC: 13 MMOL/L (ref 4–16)
AST SERPL-CCNC: 17 IU/L (ref 15–37)
BASOPHILS ABSOLUTE: 0 K/CU MM
BASOPHILS RELATIVE PERCENT: 0.3 % (ref 0–1)
BILIRUB SERPL-MCNC: 0.2 MG/DL (ref 0–1)
BUN BLDV-MCNC: 11 MG/DL (ref 6–23)
CALCIUM SERPL-MCNC: 8.9 MG/DL (ref 8.3–10.6)
CHLORIDE BLD-SCNC: 104 MMOL/L (ref 99–110)
CO2: 24 MMOL/L (ref 21–32)
CREAT SERPL-MCNC: 1.1 MG/DL (ref 0.6–1.1)
DIFFERENTIAL TYPE: ABNORMAL
EOSINOPHILS ABSOLUTE: 0.1 K/CU MM
EOSINOPHILS RELATIVE PERCENT: 1.5 % (ref 0–3)
GFR AFRICAN AMERICAN: >60 ML/MIN/1.73M2
GFR NON-AFRICAN AMERICAN: 51 ML/MIN/1.73M2
GLUCOSE BLD-MCNC: 84 MG/DL (ref 70–99)
HCT VFR BLD CALC: 38.6 % (ref 37–47)
HEMOGLOBIN: 12.1 GM/DL (ref 12.5–16)
LYMPHOCYTES ABSOLUTE: 3.2 K/CU MM
LYMPHOCYTES RELATIVE PERCENT: 34.9 % (ref 24–44)
MCH RBC QN AUTO: 28.5 PG (ref 27–31)
MCHC RBC AUTO-ENTMCNC: 31.3 % (ref 32–36)
MCV RBC AUTO: 90.8 FL (ref 78–100)
MONOCYTES ABSOLUTE: 0.6 K/CU MM
MONOCYTES RELATIVE PERCENT: 6 % (ref 0–4)
PDW BLD-RTO: 14 % (ref 11.7–14.9)
PLATELET # BLD: 354 K/CU MM (ref 140–440)
PMV BLD AUTO: 8.9 FL (ref 7.5–11.1)
POTASSIUM SERPL-SCNC: 4.3 MMOL/L (ref 3.5–5.1)
RBC # BLD: 4.25 M/CU MM (ref 4.2–5.4)
SEGMENTED NEUTROPHILS ABSOLUTE COUNT: 5.3 K/CU MM
SEGMENTED NEUTROPHILS RELATIVE PERCENT: 57.3 % (ref 36–66)
SODIUM BLD-SCNC: 141 MMOL/L (ref 135–145)
TOTAL PROTEIN: 7.1 GM/DL (ref 6.4–8.2)
WBC # BLD: 9.2 K/CU MM (ref 4–10.5)

## 2020-02-20 PROCEDURE — 6360000002 HC RX W HCPCS

## 2020-02-20 PROCEDURE — 36415 COLL VENOUS BLD VENIPUNCTURE: CPT

## 2020-02-20 PROCEDURE — 96413 CHEMO IV INFUSION 1 HR: CPT

## 2020-02-20 PROCEDURE — 2580000003 HC RX 258: Performed by: INTERNAL MEDICINE

## 2020-02-20 PROCEDURE — 85025 COMPLETE CBC W/AUTO DIFF WBC: CPT

## 2020-02-20 PROCEDURE — 80053 COMPREHEN METABOLIC PANEL: CPT

## 2020-02-20 PROCEDURE — 6360000002 HC RX W HCPCS: Performed by: INTERNAL MEDICINE

## 2020-02-20 RX ORDER — HEPARIN SODIUM (PORCINE) LOCK FLUSH IV SOLN 100 UNIT/ML 100 UNIT/ML
SOLUTION INTRAVENOUS
Status: DISPENSED
Start: 2020-02-20 | End: 2020-02-20

## 2020-05-07 ENCOUNTER — HOSPITAL ENCOUNTER (OUTPATIENT)
Dept: INFUSION THERAPY | Age: 55
Discharge: HOME | End: 2020-05-07
Payer: MEDICARE

## 2020-05-07 LAB
ALBUMIN SERPL-MCNC: 4.5 GM/DL (ref 3.4–5)
ALP BLD-CCNC: 132 IU/L (ref 40–129)
ALT SERPL-CCNC: 16 U/L (ref 10–40)
ANION GAP SERPL CALCULATED.3IONS-SCNC: 13 MMOL/L (ref 4–16)
AST SERPL-CCNC: 18 IU/L (ref 15–37)
BASOPHILS ABSOLUTE: 0 K/CU MM
BASOPHILS RELATIVE PERCENT: 0.2 % (ref 0–1)
BILIRUB SERPL-MCNC: 0.2 MG/DL (ref 0–1)
BUN BLDV-MCNC: 19 MG/DL (ref 6–23)
CALCIUM SERPL-MCNC: 9.2 MG/DL (ref 8.3–10.6)
CHLORIDE BLD-SCNC: 103 MMOL/L (ref 99–110)
CO2: 23 MMOL/L (ref 21–32)
CREAT SERPL-MCNC: 0.9 MG/DL (ref 0.6–1.1)
DIFFERENTIAL TYPE: ABNORMAL
EOSINOPHILS ABSOLUTE: 0.2 K/CU MM
EOSINOPHILS RELATIVE PERCENT: 1.5 % (ref 0–3)
GFR AFRICAN AMERICAN: >60 ML/MIN/1.73M2
GFR NON-AFRICAN AMERICAN: >60 ML/MIN/1.73M2
GLUCOSE BLD-MCNC: 105 MG/DL (ref 70–99)
HCT VFR BLD CALC: 43.1 % (ref 37–47)
HEMOGLOBIN: 14 GM/DL (ref 12.5–16)
LYMPHOCYTES ABSOLUTE: 3.4 K/CU MM
LYMPHOCYTES RELATIVE PERCENT: 30.9 % (ref 24–44)
MCH RBC QN AUTO: 28.6 PG (ref 27–31)
MCHC RBC AUTO-ENTMCNC: 32.5 % (ref 32–36)
MCV RBC AUTO: 88 FL (ref 78–100)
MONOCYTES ABSOLUTE: 0.9 K/CU MM
MONOCYTES RELATIVE PERCENT: 8.5 % (ref 0–4)
PDW BLD-RTO: 14.6 % (ref 11.7–14.9)
PLATELET # BLD: 350 K/CU MM (ref 140–440)
PMV BLD AUTO: 9.2 FL (ref 7.5–11.1)
POTASSIUM SERPL-SCNC: 4.3 MMOL/L (ref 3.5–5.1)
RBC # BLD: 4.9 M/CU MM (ref 4.2–5.4)
SEGMENTED NEUTROPHILS ABSOLUTE COUNT: 6.5 K/CU MM
SEGMENTED NEUTROPHILS RELATIVE PERCENT: 58.9 % (ref 36–66)
SODIUM BLD-SCNC: 139 MMOL/L (ref 135–145)
TOTAL PROTEIN: 7.4 GM/DL (ref 6.4–8.2)
WBC # BLD: 11.1 K/CU MM (ref 4–10.5)

## 2020-05-07 PROCEDURE — 96413 CHEMO IV INFUSION 1 HR: CPT

## 2020-05-07 PROCEDURE — 6360000002 HC RX W HCPCS: Performed by: INTERNAL MEDICINE

## 2020-05-07 PROCEDURE — 36415 COLL VENOUS BLD VENIPUNCTURE: CPT

## 2020-05-07 PROCEDURE — 85025 COMPLETE CBC W/AUTO DIFF WBC: CPT

## 2020-05-07 PROCEDURE — 36591 DRAW BLOOD OFF VENOUS DEVICE: CPT

## 2020-05-07 PROCEDURE — 80053 COMPREHEN METABOLIC PANEL: CPT

## 2020-05-07 PROCEDURE — 2580000003 HC RX 258: Performed by: INTERNAL MEDICINE

## 2020-05-07 PROCEDURE — 6360000002 HC RX W HCPCS

## 2020-05-07 RX ORDER — HEPARIN SODIUM (PORCINE) LOCK FLUSH IV SOLN 100 UNIT/ML 100 UNIT/ML
SOLUTION INTRAVENOUS
Status: DISPENSED
Start: 2020-05-07 | End: 2020-05-07

## 2020-05-29 ENCOUNTER — HOSPITAL ENCOUNTER (OUTPATIENT)
Dept: INFUSION THERAPY | Age: 55
Discharge: HOME | End: 2020-05-29
Payer: MEDICARE

## 2020-05-29 LAB
ALBUMIN SERPL-MCNC: 4.5 GM/DL (ref 3.4–5)
ALP BLD-CCNC: 130 IU/L (ref 40–128)
ALT SERPL-CCNC: 12 U/L (ref 10–40)
ANION GAP SERPL CALCULATED.3IONS-SCNC: 15 MMOL/L (ref 4–16)
AST SERPL-CCNC: 18 IU/L (ref 15–37)
BASOPHILS ABSOLUTE: 0 K/CU MM
BASOPHILS RELATIVE PERCENT: 0.3 % (ref 0–1)
BILIRUB SERPL-MCNC: 0.3 MG/DL (ref 0–1)
BUN BLDV-MCNC: 14 MG/DL (ref 6–23)
CALCIUM SERPL-MCNC: 9.2 MG/DL (ref 8.3–10.6)
CHLORIDE BLD-SCNC: 101 MMOL/L (ref 99–110)
CO2: 24 MMOL/L (ref 21–32)
CREAT SERPL-MCNC: 1.1 MG/DL (ref 0.6–1.1)
DIFFERENTIAL TYPE: ABNORMAL
EOSINOPHILS ABSOLUTE: 0.1 K/CU MM
EOSINOPHILS RELATIVE PERCENT: 1.3 % (ref 0–3)
GFR AFRICAN AMERICAN: >60 ML/MIN/1.73M2
GFR NON-AFRICAN AMERICAN: 51 ML/MIN/1.73M2
GLUCOSE BLD-MCNC: 107 MG/DL (ref 70–99)
HCT VFR BLD CALC: 42.3 % (ref 37–47)
HEMOGLOBIN: 13.8 GM/DL (ref 12.5–16)
LYMPHOCYTES ABSOLUTE: 3.4 K/CU MM
LYMPHOCYTES RELATIVE PERCENT: 35.4 % (ref 24–44)
MCH RBC QN AUTO: 28.8 PG (ref 27–31)
MCHC RBC AUTO-ENTMCNC: 32.6 % (ref 32–36)
MCV RBC AUTO: 88.3 FL (ref 78–100)
MONOCYTES ABSOLUTE: 0.9 K/CU MM
MONOCYTES RELATIVE PERCENT: 8.9 % (ref 0–4)
PDW BLD-RTO: 14.7 % (ref 11.7–14.9)
PLATELET # BLD: 316 K/CU MM (ref 140–440)
PMV BLD AUTO: 9.3 FL (ref 7.5–11.1)
POTASSIUM SERPL-SCNC: 4.6 MMOL/L (ref 3.5–5.1)
RBC # BLD: 4.79 M/CU MM (ref 4.2–5.4)
SEGMENTED NEUTROPHILS ABSOLUTE COUNT: 5.1 K/CU MM
SEGMENTED NEUTROPHILS RELATIVE PERCENT: 54.1 % (ref 36–66)
SODIUM BLD-SCNC: 140 MMOL/L (ref 135–145)
TOTAL PROTEIN: 7.5 GM/DL (ref 6.4–8.2)
WBC # BLD: 9.5 K/CU MM (ref 4–10.5)

## 2020-05-29 PROCEDURE — 6360000002 HC RX W HCPCS

## 2020-05-29 PROCEDURE — 36415 COLL VENOUS BLD VENIPUNCTURE: CPT

## 2020-05-29 PROCEDURE — 36591 DRAW BLOOD OFF VENOUS DEVICE: CPT

## 2020-05-29 PROCEDURE — 85025 COMPLETE CBC W/AUTO DIFF WBC: CPT

## 2020-05-29 PROCEDURE — 2580000003 HC RX 258: Performed by: INTERNAL MEDICINE

## 2020-05-29 PROCEDURE — 96413 CHEMO IV INFUSION 1 HR: CPT

## 2020-05-29 PROCEDURE — 80053 COMPREHEN METABOLIC PANEL: CPT

## 2020-05-29 PROCEDURE — 6360000002 HC RX W HCPCS: Performed by: INTERNAL MEDICINE

## 2020-05-29 RX ORDER — HEPARIN SODIUM (PORCINE) LOCK FLUSH IV SOLN 100 UNIT/ML 100 UNIT/ML
SOLUTION INTRAVENOUS
Status: DISCONTINUED
Start: 2020-05-29 | End: 2020-05-30 | Stop reason: HOSPADM

## 2020-06-12 ENCOUNTER — HOSPITAL ENCOUNTER (OUTPATIENT)
Dept: INFUSION THERAPY | Age: 55
Discharge: HOME | End: 2020-06-12
Payer: MEDICARE

## 2020-06-12 LAB
ALBUMIN SERPL-MCNC: 4.5 GM/DL (ref 3.4–5)
ALP BLD-CCNC: 137 IU/L (ref 40–128)
ALT SERPL-CCNC: 13 U/L (ref 10–40)
ANION GAP SERPL CALCULATED.3IONS-SCNC: 11 MMOL/L (ref 4–16)
AST SERPL-CCNC: 18 IU/L (ref 15–37)
BASOPHILS ABSOLUTE: 0 K/CU MM
BASOPHILS RELATIVE PERCENT: 0.3 % (ref 0–1)
BILIRUB SERPL-MCNC: 0.3 MG/DL (ref 0–1)
BUN BLDV-MCNC: 11 MG/DL (ref 6–23)
CALCIUM SERPL-MCNC: 8.9 MG/DL (ref 8.3–10.6)
CHLORIDE BLD-SCNC: 103 MMOL/L (ref 99–110)
CO2: 23 MMOL/L (ref 21–32)
CREAT SERPL-MCNC: 1 MG/DL (ref 0.6–1.1)
DIFFERENTIAL TYPE: ABNORMAL
EOSINOPHILS ABSOLUTE: 0.1 K/CU MM
EOSINOPHILS RELATIVE PERCENT: 1.1 % (ref 0–3)
GFR AFRICAN AMERICAN: >60 ML/MIN/1.73M2
GFR NON-AFRICAN AMERICAN: 57 ML/MIN/1.73M2
GLUCOSE BLD-MCNC: 99 MG/DL (ref 70–99)
HCT VFR BLD CALC: 40.8 % (ref 37–47)
HEMOGLOBIN: 13.4 GM/DL (ref 12.5–16)
LYMPHOCYTES ABSOLUTE: 3.1 K/CU MM
LYMPHOCYTES RELATIVE PERCENT: 32.2 % (ref 24–44)
MCH RBC QN AUTO: 28.6 PG (ref 27–31)
MCHC RBC AUTO-ENTMCNC: 32.8 % (ref 32–36)
MCV RBC AUTO: 87.2 FL (ref 78–100)
MONOCYTES ABSOLUTE: 0.8 K/CU MM
MONOCYTES RELATIVE PERCENT: 7.7 % (ref 0–4)
PDW BLD-RTO: 14.3 % (ref 11.7–14.9)
PLATELET # BLD: 347 K/CU MM (ref 140–440)
PMV BLD AUTO: 9.1 FL (ref 7.5–11.1)
POTASSIUM SERPL-SCNC: 3.9 MMOL/L (ref 3.5–5.1)
RBC # BLD: 4.68 M/CU MM (ref 4.2–5.4)
SEGMENTED NEUTROPHILS ABSOLUTE COUNT: 5.7 K/CU MM
SEGMENTED NEUTROPHILS RELATIVE PERCENT: 58.7 % (ref 36–66)
SODIUM BLD-SCNC: 137 MMOL/L (ref 135–145)
TOTAL PROTEIN: 7.2 GM/DL (ref 6.4–8.2)
WBC # BLD: 9.8 K/CU MM (ref 4–10.5)

## 2020-06-12 PROCEDURE — 6360000002 HC RX W HCPCS

## 2020-06-12 PROCEDURE — 2580000003 HC RX 258: Performed by: INTERNAL MEDICINE

## 2020-06-12 PROCEDURE — 85025 COMPLETE CBC W/AUTO DIFF WBC: CPT

## 2020-06-12 PROCEDURE — 96413 CHEMO IV INFUSION 1 HR: CPT

## 2020-06-12 PROCEDURE — 6360000002 HC RX W HCPCS: Performed by: INTERNAL MEDICINE

## 2020-06-12 PROCEDURE — 36591 DRAW BLOOD OFF VENOUS DEVICE: CPT

## 2020-06-12 PROCEDURE — 80053 COMPREHEN METABOLIC PANEL: CPT

## 2020-06-12 PROCEDURE — 36415 COLL VENOUS BLD VENIPUNCTURE: CPT

## 2020-06-12 RX ORDER — HEPARIN SODIUM (PORCINE) LOCK FLUSH IV SOLN 100 UNIT/ML 100 UNIT/ML
SOLUTION INTRAVENOUS
Status: DISPENSED
Start: 2020-06-12 | End: 2020-06-12

## 2020-06-23 PROBLEM — I26.99 OTHER PULMONARY EMBOLISM WITHOUT ACUTE COR PULMONALE (HCC): Status: ACTIVE | Noted: 2020-06-23

## 2020-06-23 PROBLEM — A04.72 ENTEROCOLITIS DUE TO CLOSTRIDIUM DIFFICILE, NOT SPECIFIED AS RECURRENT: Status: ACTIVE | Noted: 2020-06-23

## 2020-06-23 PROBLEM — C79.51 SECONDARY MALIGNANT NEOPLASM OF BONE AND BONE MARROW (HCC): Status: ACTIVE | Noted: 2020-06-23

## 2020-06-23 PROBLEM — E03.9 HYPOTHYROIDISM, UNSPECIFIED: Status: ACTIVE | Noted: 2020-06-23

## 2020-06-23 PROBLEM — C79.52 SECONDARY MALIGNANT NEOPLASM OF BONE AND BONE MARROW (HCC): Status: ACTIVE | Noted: 2020-06-23

## 2020-08-13 ENCOUNTER — OFFICE VISIT (OUTPATIENT)
Dept: ONCOLOGY | Age: 55
End: 2020-08-13
Payer: MEDICARE

## 2020-08-13 ENCOUNTER — TELEPHONE (OUTPATIENT)
Dept: INFUSION THERAPY | Age: 55
End: 2020-08-13

## 2020-08-13 ENCOUNTER — HOSPITAL ENCOUNTER (OUTPATIENT)
Dept: INFUSION THERAPY | Age: 55
Discharge: HOME | End: 2020-08-13
Payer: MEDICARE

## 2020-08-13 VITALS
HEART RATE: 81 BPM | DIASTOLIC BLOOD PRESSURE: 80 MMHG | OXYGEN SATURATION: 98 % | BODY MASS INDEX: 20.87 KG/M2 | SYSTOLIC BLOOD PRESSURE: 141 MMHG | RESPIRATION RATE: 16 BRPM | HEIGHT: 62 IN | TEMPERATURE: 97.7 F

## 2020-08-13 VITALS — TEMPERATURE: 97.7 F

## 2020-08-13 VITALS
WEIGHT: 169.3 LBS | RESPIRATION RATE: 16 BRPM | HEIGHT: 62 IN | TEMPERATURE: 97.7 F | DIASTOLIC BLOOD PRESSURE: 80 MMHG | HEART RATE: 81 BPM | BODY MASS INDEX: 31.15 KG/M2 | OXYGEN SATURATION: 98 % | SYSTOLIC BLOOD PRESSURE: 141 MMHG

## 2020-08-13 DIAGNOSIS — C34.92 CARCINOMA OF LEFT LUNG (HCC): Primary | ICD-10-CM

## 2020-08-13 LAB
ALBUMIN SERPL-MCNC: 4.2 GM/DL (ref 3.4–5)
ALP BLD-CCNC: 136 IU/L (ref 40–129)
ALT SERPL-CCNC: 13 U/L (ref 10–40)
ANION GAP SERPL CALCULATED.3IONS-SCNC: 12 MMOL/L (ref 4–16)
AST SERPL-CCNC: 17 IU/L (ref 15–37)
BASOPHILS ABSOLUTE: 0 K/CU MM
BASOPHILS RELATIVE PERCENT: 0.3 % (ref 0–1)
BILIRUB SERPL-MCNC: 0.2 MG/DL (ref 0–1)
BUN BLDV-MCNC: 10 MG/DL (ref 6–23)
CALCIUM SERPL-MCNC: 8.7 MG/DL (ref 8.3–10.6)
CHLORIDE BLD-SCNC: 105 MMOL/L (ref 99–110)
CO2: 22 MMOL/L (ref 21–32)
CREAT SERPL-MCNC: 0.9 MG/DL (ref 0.6–1.1)
DIFFERENTIAL TYPE: ABNORMAL
EOSINOPHILS ABSOLUTE: 0.2 K/CU MM
EOSINOPHILS RELATIVE PERCENT: 1.7 % (ref 0–3)
GFR AFRICAN AMERICAN: >60 ML/MIN/1.73M2
GFR NON-AFRICAN AMERICAN: >60 ML/MIN/1.73M2
GLUCOSE BLD-MCNC: 105 MG/DL (ref 70–99)
HCT VFR BLD CALC: 40.9 % (ref 37–47)
HEMOGLOBIN: 13.3 GM/DL (ref 12.5–16)
LACTATE DEHYDROGENASE: 167 IU/L (ref 120–246)
LYMPHOCYTES ABSOLUTE: 3.2 K/CU MM
LYMPHOCYTES RELATIVE PERCENT: 34.4 % (ref 24–44)
MCH RBC QN AUTO: 29.1 PG (ref 27–31)
MCHC RBC AUTO-ENTMCNC: 32.5 % (ref 32–36)
MCV RBC AUTO: 89.5 FL (ref 78–100)
MONOCYTES ABSOLUTE: 0.7 K/CU MM
MONOCYTES RELATIVE PERCENT: 7.6 % (ref 0–4)
PDW BLD-RTO: 14.5 % (ref 11.7–14.9)
PLATELET # BLD: 309 K/CU MM (ref 140–440)
PMV BLD AUTO: 9.4 FL (ref 7.5–11.1)
POTASSIUM SERPL-SCNC: 3.9 MMOL/L (ref 3.5–5.1)
RBC # BLD: 4.57 M/CU MM (ref 4.2–5.4)
SEGMENTED NEUTROPHILS ABSOLUTE COUNT: 5.1 K/CU MM
SEGMENTED NEUTROPHILS RELATIVE PERCENT: 56 % (ref 36–66)
SODIUM BLD-SCNC: 139 MMOL/L (ref 135–145)
TOTAL PROTEIN: 6.6 GM/DL (ref 6.4–8.2)
WBC # BLD: 9.2 K/CU MM (ref 4–10.5)

## 2020-08-13 PROCEDURE — 80053 COMPREHEN METABOLIC PANEL: CPT

## 2020-08-13 PROCEDURE — 2580000003 HC RX 258: Performed by: INTERNAL MEDICINE

## 2020-08-13 PROCEDURE — 36591 DRAW BLOOD OFF VENOUS DEVICE: CPT

## 2020-08-13 PROCEDURE — 96413 CHEMO IV INFUSION 1 HR: CPT

## 2020-08-13 PROCEDURE — 85025 COMPLETE CBC W/AUTO DIFF WBC: CPT

## 2020-08-13 PROCEDURE — 6360000002 HC RX W HCPCS: Performed by: INTERNAL MEDICINE

## 2020-08-13 PROCEDURE — 96367 TX/PROPH/DG ADDL SEQ IV INF: CPT

## 2020-08-13 PROCEDURE — 83615 LACTATE (LD) (LDH) ENZYME: CPT

## 2020-08-13 PROCEDURE — G0444 DEPRESSION SCREEN ANNUAL: HCPCS | Performed by: INTERNAL MEDICINE

## 2020-08-13 PROCEDURE — 99214 OFFICE O/P EST MOD 30 MIN: CPT | Performed by: INTERNAL MEDICINE

## 2020-08-13 RX ORDER — METHYLPREDNISOLONE SODIUM SUCCINATE 125 MG/2ML
125 INJECTION, POWDER, LYOPHILIZED, FOR SOLUTION INTRAMUSCULAR; INTRAVENOUS ONCE
Status: CANCELLED | OUTPATIENT
Start: 2020-08-13

## 2020-08-13 RX ORDER — MEPERIDINE HYDROCHLORIDE 50 MG/ML
12.5 INJECTION INTRAMUSCULAR; INTRAVENOUS; SUBCUTANEOUS ONCE
Status: CANCELLED | OUTPATIENT
Start: 2020-08-13

## 2020-08-13 RX ORDER — SODIUM CHLORIDE 0.9 % (FLUSH) 0.9 %
10 SYRINGE (ML) INJECTION PRN
Status: DISCONTINUED | OUTPATIENT
Start: 2020-08-13 | End: 2020-08-14 | Stop reason: HOSPADM

## 2020-08-13 RX ORDER — SODIUM CHLORIDE 0.9 % (FLUSH) 0.9 %
5 SYRINGE (ML) INJECTION PRN
Status: CANCELLED | OUTPATIENT
Start: 2020-08-13

## 2020-08-13 RX ORDER — HEPARIN SODIUM (PORCINE) LOCK FLUSH IV SOLN 100 UNIT/ML 100 UNIT/ML
500 SOLUTION INTRAVENOUS PRN
Status: DISCONTINUED | OUTPATIENT
Start: 2020-08-13 | End: 2020-08-14 | Stop reason: HOSPADM

## 2020-08-13 RX ORDER — SODIUM CHLORIDE 9 MG/ML
INJECTION, SOLUTION INTRAVENOUS CONTINUOUS
Status: CANCELLED | OUTPATIENT
Start: 2020-08-13

## 2020-08-13 RX ORDER — DIPHENHYDRAMINE HYDROCHLORIDE 50 MG/ML
50 INJECTION INTRAMUSCULAR; INTRAVENOUS ONCE
Status: CANCELLED | OUTPATIENT
Start: 2020-08-13

## 2020-08-13 RX ORDER — SODIUM CHLORIDE 9 MG/ML
20 INJECTION, SOLUTION INTRAVENOUS ONCE
Status: CANCELLED | OUTPATIENT
Start: 2020-08-13

## 2020-08-13 RX ORDER — EPINEPHRINE 1 MG/ML
0.3 INJECTION, SOLUTION, CONCENTRATE INTRAVENOUS PRN
Status: CANCELLED | OUTPATIENT
Start: 2020-08-13

## 2020-08-13 RX ORDER — LORATADINE 10 MG/1
10 TABLET ORAL DAILY
Qty: 30 TABLET | Refills: 5 | Status: SHIPPED | OUTPATIENT
Start: 2020-08-13 | End: 2022-06-01

## 2020-08-13 RX ADMIN — Medication 500 UNITS: at 13:21

## 2020-08-13 RX ADMIN — SODIUM CHLORIDE 200 MG: 9 INJECTION, SOLUTION INTRAVENOUS at 12:46

## 2020-08-13 RX ADMIN — SODIUM CHLORIDE, PRESERVATIVE FREE 10 ML: 5 INJECTION INTRAVENOUS at 13:21

## 2020-08-13 ASSESSMENT — PATIENT HEALTH QUESTIONNAIRE - PHQ9
SUM OF ALL RESPONSES TO PHQ9 QUESTIONS 1 & 2: 3
7. TROUBLE CONCENTRATING ON THINGS, SUCH AS READING THE NEWSPAPER OR WATCHING TELEVISION: 1
1. LITTLE INTEREST OR PLEASURE IN DOING THINGS: 0
4. FEELING TIRED OR HAVING LITTLE ENERGY: 3
9. THOUGHTS THAT YOU WOULD BE BETTER OFF DEAD, OR OF HURTING YOURSELF: 0
3. TROUBLE FALLING OR STAYING ASLEEP: 3
SUM OF ALL RESPONSES TO PHQ QUESTIONS 1-9: 16
5. POOR APPETITE OR OVEREATING: 1
2. FEELING DOWN, DEPRESSED OR HOPELESS: 3
10. IF YOU CHECKED OFF ANY PROBLEMS, HOW DIFFICULT HAVE THESE PROBLEMS MADE IT FOR YOU TO DO YOUR WORK, TAKE CARE OF THINGS AT HOME, OR GET ALONG WITH OTHER PEOPLE: 0
6. FEELING BAD ABOUT YOURSELF - OR THAT YOU ARE A FAILURE OR HAVE LET YOURSELF OR YOUR FAMILY DOWN: 3
8. MOVING OR SPEAKING SO SLOWLY THAT OTHER PEOPLE COULD HAVE NOTICED. OR THE OPPOSITE, BEING SO FIGETY OR RESTLESS THAT YOU HAVE BEEN MOVING AROUND A LOT MORE THAN USUAL: 2
SUM OF ALL RESPONSES TO PHQ QUESTIONS 1-9: 16

## 2020-08-13 NOTE — PROGRESS NOTES
MA Rooming Questions  Patient: Audrey Lewis  MRN: [id number]    Date: 8/13/2020        1. Do you have any new issues?   no         2. Do you need any refills on medications?    no    3. Have you had any imaging done since your last visit?   no    4. Have you been hospitalized or seen in the emergency room since your last visit here?   no    5. Did the patient have a PHQ-9 collected today? Yes  PHQ-9 Total Score: 16 (8/13/2020 12:05 PM)  Thoughts that you would be better off dead, or of hurting yourself in some way: 0 (8/13/2020 12:05 PM)      PHQ-9 Given to: Dr. Som Salcido              PHQ-9 Score:                     [x] Positive     []  Negative              Does question #9 need addressed?                      [] Yes    [x]  No               [place], [place]

## 2020-08-13 NOTE — PROGRESS NOTES
Patient Name: Chase Del Valle  Patient : 1965  Patient MRN: [id number]     Primary Oncologist: Geraldo Yun MD  Referring Physician: Pete Zendejas     Date of Service: 20       Chief Complaint:   Chief Complaint   Patient presents with    Chemotherapy        Active Problem list  1. Carcinoma of left lung ([facility] [address]    2. Cerebrovascular accident (CVA), unspecified mechanism ([facility] [address]           HPI:   She is a very pleasant  lady [ 1965] with a 35-pack year history of smoking. She has a longstanding cough and shortness of breath related to her smoking; however, it got worse in late 2016 and she was treated as possible infectious episode, but did not improve., 2.  chest x-ray suggested pneumonia with a left upper lobe lesion 6.5 cm. CT chest done on 2016, showed a left upper lobe lung lesion measuring 6.5 by 5 cm, with extension to the first and second ribs posteriorly and bony erosion. She thus underwent CT-guided biopsy of the mass on 2016. Preliminary pathology found it to be a poorly differentiated adenocarcinoma. EGFR Negative, ALK & ROS-1 could not be done for lack of enough cells. CEA 2.2. ,  PET-CT, revealed the left upper lobe mass, which shows avid on PET-CT with extension into the left third rib, as well as to the spinous process of T3, however, felt to be resectable by Dr. Marian Ugalde. but he was not in her insurance group,  so she was referred to Dr. Usman Maria in [facility], who evaluated her on  and again on 2016. Additional workup done at [facility], included MRI of the chest, which showed a large Left upper lobe apical mass invading the left T3 transverse process and partially destroying the posterior left third rib. Mass abuts the undersurface of left second rib with invasion and completely fills the left T2-3 neural foramen. It did not invade or abut the undersurface of left brachial plexus, 5 mm away from that.   Dr. Usman Maria discussed the case with me after they had discussion at the Tumor Board and getting [facility] peer review, neurosurgical opinion, Gloria Landaverde, and it was felt that neoadjuvant chemotherapy versus chemoradiation could be considered and then surgical removal after that. The second option was to give only neoadjuvant chemotherapy and then surgery, which will be easier if the radiation was not given preop and consider postop radiation plus additional adjuvant chemotherapy, depending on the final pathology upon resection. She also had CT of the chest with contrast and CT of the thoracic spine without contrast on September 22, 2016, which confirmed the interval mild increase in the size of the large left upper lobe mass, within the medial left apex, extending into the posterior segment of left upper lobe, invading mediastinum, left posterior third rib, and left T3 transverse process, mass completely filling the T2-3 neural foramen, left hilar adenopathy, likely metastatic, and mildly enlarged subcarinal lymph node concerning for mets, a small nodule within the left upper lobe concerning for local mets. she underwent EBUS procedure with pollo biopsy, which turned out to be negative. MRI of the brain showed encephalomalacia in the left frontal temporal lobe, occlusion in the left internal carotid artery, and wallerian degeneration on the left. PET-CT August 18, 5765, showed metabolically active left upper lobe mass with local chest wall invasion and left posterior third rib and adjacent soft tissue, mildly asymmetrical activity in the left hilum, possibly could be reactive. No distant mets. Findings suggestive of left MCA distribution infarct. PFTs showed FEV1 of 2.06, DLCO/VA 82 percent.    Final stage IIIa [T4N0M0]   I agreed with the plan of neoadjuvant chemotherapy with carboplatinum and Alimta for three to four courses and depending upon the initial response after a couple of doses, we will do imaging again to see if the tumor is responding and if so, we will continue with four courses and follow it up with surgery, to be followed by adjuvant chemotherapy and radiation therapy. I preferred  not to use a taxane because of her neuropathy, for which she is currently on gabapentin, and previous stroke. After two courses of neoadjuvant chemotherapy with carbo/Alimta, she did get a CT of the chest December 7, 2016, which did show a decrease in the size of her tumor in the left upper lobe from 7.1 by 4.5 to 5.4 by 3.4 cm in size, indicating a partial response. However, she also showed evidence of some silent right lower lobe pulmonary emboli. These were not seen in the previous CT; however, that was done without contrast and not seen on the PET-CT, also, for the same reason. She is not very symptomatic and has no chest pain, shortness of breath, or significant cough. No leg or arm pain or swelling. Bilateral lower extremity ultrasounds, showed  No evidence of DVT in either lower extremity. CTA of her chest was done on January 17, 2017. No residual PE was found. The mass in the left suprahilar area now measured 8.7 by 4.3 compared to 6 by 5 cm before. Mild patchy opacity in the right lower lobe, possible atelectasis, also moderate emphysema. CT of the neck showed occluded left common carotid artery and internal carotid artery. Partially visualized left lung mass with invasion of the posterior chest wall. CT of the abdomen and pelvis showed no evidence of any metastasis in the abdomen or pelvis. There is a stable sclerotic lesion SI joint, maybe degenerative, and a questionable L3 sclerotic lesion. PET-CT February 16, 2017, showed progression of the large paramediastinal mass at the left apex 9.7 by 5 cm versus 6.7 by 4 previously, and mets into cervical lymph nodes, retroclavicular, and left axillary lymph nodes, right tonsillar uptake.   This indicated PD after 1st line Chemotherapy with Carbo/Alimta She had a repeat biopsy of lung mass on February 13, 2017, and sent for [facility] testing which showed additional genomic alterations, including met amplification, which would make her eligible for crizotinib therapy, if she fails to pembro. She also could be a candidate for cabozantinib through a clinical trial.  PD-L1 antibodies came back positive 80% expression for pembrolizumab, which according to the NCCN guidelines, would even make her a first-line therapy for metastatic non-small cell carcinoma of the lung. EGFR was negative. ALK was also negative ROS1 also negative. Because of her high tumor mutation burden (high TMB), 32 mutations/MB, she has been a good candidate for immunotherapy with Nivolumab, pembrolizumab, or atezolizumab. Her PD-L1 expression was high, 80 percent through [facility] and 60 percent through [address] [place] [address]. [place]. She was thus started on Pembro[Keytruda]on March 6, 2017, tolerating well. She also Dr. Wendy Carney in February after her PET/CT and because she was felt to be unresectable at that point and was asymptomatic, he did not feel immediate radiation was indicated because of lack of symptoms and it was postponed to do that unless she became symptomatic. After 4 doses of Pembro, CAT scan of the chest done on May 12, 2017, showed no evidence of pulmonary embolism. Significant reduction in the size of malignant left apical mass compared with January of 2017. Increase in the amount of mediastinal and right hilar adenopathy since the prior study, possibly reactive and indeterminate in nature. There is a 6.6 mm right middle lobe pulmonary nodule, indeterminate. Mild right lower lobe atelectasis, bullous changes in the lungs. The left apical mass invading the mediastinum, as well as posterior chest wall. 5-18  CT CAP:  negative for disease   11-18 CT CAP negative  3-19   CT CAP negative   5-19  MRI brain negative   8-19  CT abdomen negative.     8-19  CT chest negative  1/17/20: CT chest, abdomen and pelvis neg for mets   7/20/2020 CT scan of the chest abdomen pelvis showed left lower lobe scarring otherwise no evidence of metastatic disease in the chest, abdomen or pelvis. She has not had any febrile from 6/12/2022 8/14/2020 due to cold pandemic. PAST MEDICAL HISTORY:  1. Hypertension, for which she takes no medication. 2. History of CVA in July of 2008, with right hemiparesis and speech involvement. After rehab, she is able to walk with the help of a cane. Speech is still impaired, with ongoing residual weakness. She is able to walk with a cane. 3. She is taking gabapentin for neuropathy. 4. Zoloft for depression and trazadone at night. 5. For high cholesterol she takes simvastatin. 6. History of irritable bowel syndrome. 7. Fibromyalgia., 8. platelet count had jumped up to more than a million in Nov 2016. Because of her previous history of stroke, I did check for JAK2 gene mutation to make sure she did not have underlying polycythemia or a myeloproliferative process in addition. JAK2 came back negative,  9. In Jan 2017,hemoglobin slowly dropped from 10 to 8.4 and a couple of days ago her hemoglobin was felt to be dropping to 6.3 from the hospital lab, with hematocrit dropping from 27 to 20,  10. Mammogram May 16, 2017, showed category 1, negative    PAST SURGICAL HISTORY:  1. Tubal ligation. 2. Appendectomy. 3. Tonsillectomy. 4. Carpal tunnel release. 5. D&C. FAMILY HISTORY:  Paternal grandmother and paternal aunt had breast cancer, maternal grandmother had uterine cancer. She has two sisters and one brother Evita Lackey. PERSONAL HISTORY:  She has a 35-pack year history of smoking, moderate to heavy drinking in the past, but quit 20 years ago. She has been  for 25 years. 3 children.  She has one daughter, Miguelito , and two sons Tripp &.      Current Therapy  Pembrolizumab D1 Q21D ([facility]CC, NSCLC)X12 Cycle Length: 21 Number Cycles: 08/13/2020    BASOPCT 0.3 08/13/2020    LYMPHOPCT 34.4 08/13/2020    MONOPCT 7.6 (H) 08/13/2020    BANDABS 0.05 01/16/2017    SEGSABS 5.1 08/13/2020    EOSABS 0.2 08/13/2020    BASOSABS 0.0 08/13/2020    LYMPHSABS 3.2 08/13/2020    MONOSABS 0.7 08/13/2020    DIFFTYPE AUTOMATED DIFFERENTIAL 08/13/2020    POLYCHROM 1+ 01/18/2017    PLTM PLATELETS APPEAR INCREASED 01/18/2017     No results found for: ESR    Chemistry:  Lab Results   Component Value Date     08/13/2020    K 3.9 08/13/2020     08/13/2020    CO2 22 08/13/2020    BUN 10 08/13/2020    CREATININE 0.9 08/13/2020    GLUCOSE 105 (H) 08/13/2020    CALCIUM 8.7 08/13/2020    PROT 6.6 08/13/2020    LABALBU 4.2 08/13/2020    BILITOT 0.2 08/13/2020    ALKPHOS 136 (H) 08/13/2020    AST 17 08/13/2020    ALT 13 08/13/2020    LABGLOM >60 08/13/2020    GFRAA >60 08/13/2020    PHOS 3.0 02/17/2019    MG 2.2 02/18/2019     Lab Results   Component Value Date     08/13/2020     No components found for: LD  Lab Results   Component Value Date    TSHHS 1.620 11/07/2019    T4FREE 0.99 11/07/2019    FT3 2.4 06/20/2017       Immunology:  Lab Results   Component Value Date    PROT 6.6 08/13/2020     No results found for: Michael Nick, KLFLCR  No results found for: B2M    Coagulation Panel:  Lab Results   Component Value Date    PROTIME 15.7 02/13/2017    INR 1.9 (H) 08/22/2017    APTT 32.7 02/13/2017    DDIMER 962 (H) 12/07/2016       Anemia Panel:  Lab Results   Component Value Date    DLDKLJKZ65 6846 (H) 01/18/2017    FOLATE >20.0 (H) 01/18/2017       Tumor Markers:  Lab Results   Component Value Date    CEA 1.7 04/12/2017         Imaging: Reviewed     Pathology:Reviewed     Observations:  Performance Status: ECOG 2  Depression Status: PHQ 9 Positive. Is on zoloft and follows with PCP. Deferred no further intervention        Assessment & Plan:  1.   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 A repeat biopsy and [facility] results showed complex karyotype, as above, with multiple mutations plus high PD-L1 expression. Second-line treatment with immunotherapy, pembrolizumab started in March 2017. She seems to be responding very well to that with good initial response. Based on the PET-CT results, it looks like she has shown an excellent response to her pembrolizumab therapy  She has shown near complete remission with the left apical mass decreasing from 9.5 to 4 cm, but SUV dropped from 29 to 3.5. No other evidence of disease elsewhere. We discussed her case in the Tumor Board, also, and it seems that she is not a candidate for surgery as determined in February of 2017. Her PD-L1 expression was 80 percent  She continues to be on pembrolizumab with the continued excellent treatment response. Plan to continue pembrolizumab until adverse effects of progressive disease. We will continue to repeat CT scan of the chest every 6 to 12 months. 2.   Pulmonary emboli:  She was found to have silent PE on initial evaluation on CAT scan and thus had been on Coumadin for more than a year. she had some bloody stools and bruising which resolved when we stopped the coumadin. Diarrhea  -etiology? resolved when took break from [facility]  -c scope 6-18 negative  -resolved; no issues with constipation      depression  -worsened due to situational issues(granddaughter molested)  perpertrator incarcerated and Zoloft was increased to 150  -since dosage increase patient has been staying in her room more, but more agitated/upset  -not sure if this is more psych related vs  did the increase in dose of her zoloft lead to her weight gain which has  led her to stay in her room more  -Symptoms have been stable on 150 mg of Zoloft.   Is being followed by primary care physician. Allergic sinusitis: Claritin prescribed. Flonase does not really help    Continue other medical care    Discussed the above findings and plan with her and she seems to have verbalized understanding    Recommend excercise as tolerated, healthy diet and meal plan. Also discussed the importance of being uptodate with age appropriate screening tools including mammogram, pap smear    Recommend follow up with PCP and other specialists. Answered all question    RTC august 10/15/20  or earlier if new Sx. I have recommended that the patient follow CDC guidelines for prevention of COVID-19 infection.     [id number]           Electronically signed by Braulio Varghese MD on 8/13/20 at 12:36 PM EDT

## 2020-08-21 ENCOUNTER — CLINICAL DOCUMENTATION (OUTPATIENT)
Dept: ONCOLOGY | Age: 55
End: 2020-08-21

## 2020-08-24 RX ORDER — SERTRALINE HYDROCHLORIDE 100 MG/1
TABLET, FILM COATED ORAL
Qty: 30 TABLET | Refills: 1 | Status: SHIPPED | OUTPATIENT
Start: 2020-08-24 | End: 2020-11-02

## 2020-09-02 RX ORDER — MEPERIDINE HYDROCHLORIDE 25 MG/ML
12.5 INJECTION INTRAMUSCULAR; INTRAVENOUS; SUBCUTANEOUS ONCE
Status: CANCELLED | OUTPATIENT
Start: 2020-09-03

## 2020-09-02 RX ORDER — SODIUM CHLORIDE 9 MG/ML
INJECTION, SOLUTION INTRAVENOUS CONTINUOUS
Status: CANCELLED | OUTPATIENT
Start: 2020-09-03

## 2020-09-02 RX ORDER — EPINEPHRINE 1 MG/ML
0.3 INJECTION, SOLUTION, CONCENTRATE INTRAVENOUS PRN
Status: CANCELLED | OUTPATIENT
Start: 2020-09-03

## 2020-09-02 RX ORDER — SODIUM CHLORIDE 9 MG/ML
20 INJECTION, SOLUTION INTRAVENOUS ONCE
Status: CANCELLED | OUTPATIENT
Start: 2020-09-03

## 2020-09-02 RX ORDER — DIPHENHYDRAMINE HYDROCHLORIDE 50 MG/ML
50 INJECTION INTRAMUSCULAR; INTRAVENOUS ONCE
Status: CANCELLED | OUTPATIENT
Start: 2020-09-03

## 2020-09-02 RX ORDER — METHYLPREDNISOLONE SODIUM SUCCINATE 125 MG/2ML
125 INJECTION, POWDER, LYOPHILIZED, FOR SOLUTION INTRAMUSCULAR; INTRAVENOUS ONCE
Status: CANCELLED | OUTPATIENT
Start: 2020-09-03

## 2020-09-02 RX ORDER — SODIUM CHLORIDE 0.9 % (FLUSH) 0.9 %
5 SYRINGE (ML) INJECTION PRN
Status: CANCELLED | OUTPATIENT
Start: 2020-09-03

## 2020-09-03 ENCOUNTER — HOSPITAL ENCOUNTER (OUTPATIENT)
Dept: INFUSION THERAPY | Age: 55
Discharge: HOME | End: 2020-09-03
Payer: MEDICARE

## 2020-09-03 VITALS
WEIGHT: 162 LBS | TEMPERATURE: 97.8 F | HEART RATE: 101 BPM | RESPIRATION RATE: 18 BRPM | BODY MASS INDEX: 29.81 KG/M2 | DIASTOLIC BLOOD PRESSURE: 91 MMHG | SYSTOLIC BLOOD PRESSURE: 129 MMHG | OXYGEN SATURATION: 97 % | HEIGHT: 62 IN

## 2020-09-03 DIAGNOSIS — C34.92 CARCINOMA OF LEFT LUNG (HCC): Primary | ICD-10-CM

## 2020-09-03 DIAGNOSIS — I63.9 CEREBROVASCULAR ACCIDENT (CVA), UNSPECIFIED MECHANISM (HCC): ICD-10-CM

## 2020-09-03 LAB
ALBUMIN SERPL-MCNC: 4.4 GM/DL (ref 3.4–5)
ALP BLD-CCNC: 135 IU/L (ref 40–128)
ALT SERPL-CCNC: 13 U/L (ref 10–40)
ANION GAP SERPL CALCULATED.3IONS-SCNC: 12 MMOL/L (ref 4–16)
AST SERPL-CCNC: 16 IU/L (ref 15–37)
BASOPHILS ABSOLUTE: 0 K/CU MM
BASOPHILS RELATIVE PERCENT: 0.2 % (ref 0–1)
BILIRUB SERPL-MCNC: 0.2 MG/DL (ref 0–1)
BUN BLDV-MCNC: 18 MG/DL (ref 6–23)
CALCIUM SERPL-MCNC: 8.6 MG/DL (ref 8.3–10.6)
CHLORIDE BLD-SCNC: 105 MMOL/L (ref 99–110)
CO2: 23 MMOL/L (ref 21–32)
CREAT SERPL-MCNC: 0.9 MG/DL (ref 0.6–1.1)
DIFFERENTIAL TYPE: ABNORMAL
EOSINOPHILS ABSOLUTE: 0.2 K/CU MM
EOSINOPHILS RELATIVE PERCENT: 2 % (ref 0–3)
GFR AFRICAN AMERICAN: >60 ML/MIN/1.73M2
GFR NON-AFRICAN AMERICAN: >60 ML/MIN/1.73M2
GLUCOSE BLD-MCNC: 107 MG/DL (ref 70–99)
HCT VFR BLD CALC: 41.4 % (ref 37–47)
HEMOGLOBIN: 13.5 GM/DL (ref 12.5–16)
LYMPHOCYTES ABSOLUTE: 3.2 K/CU MM
LYMPHOCYTES RELATIVE PERCENT: 36.5 % (ref 24–44)
MCH RBC QN AUTO: 29.3 PG (ref 27–31)
MCHC RBC AUTO-ENTMCNC: 32.6 % (ref 32–36)
MCV RBC AUTO: 89.8 FL (ref 78–100)
MONOCYTES ABSOLUTE: 0.7 K/CU MM
MONOCYTES RELATIVE PERCENT: 7.8 % (ref 0–4)
PDW BLD-RTO: 14.9 % (ref 11.7–14.9)
PLATELET # BLD: 302 K/CU MM (ref 140–440)
PMV BLD AUTO: 9.3 FL (ref 7.5–11.1)
POTASSIUM SERPL-SCNC: 3.9 MMOL/L (ref 3.5–5.1)
RBC # BLD: 4.61 M/CU MM (ref 4.2–5.4)
SEGMENTED NEUTROPHILS ABSOLUTE COUNT: 4.7 K/CU MM
SEGMENTED NEUTROPHILS RELATIVE PERCENT: 53.5 % (ref 36–66)
SODIUM BLD-SCNC: 140 MMOL/L (ref 135–145)
TOTAL PROTEIN: 6.7 GM/DL (ref 6.4–8.2)
WBC # BLD: 8.8 K/CU MM (ref 4–10.5)

## 2020-09-03 PROCEDURE — 96413 CHEMO IV INFUSION 1 HR: CPT

## 2020-09-03 PROCEDURE — 2580000003 HC RX 258: Performed by: INTERNAL MEDICINE

## 2020-09-03 PROCEDURE — 85025 COMPLETE CBC W/AUTO DIFF WBC: CPT

## 2020-09-03 PROCEDURE — 80053 COMPREHEN METABOLIC PANEL: CPT

## 2020-09-03 PROCEDURE — 6360000002 HC RX W HCPCS: Performed by: INTERNAL MEDICINE

## 2020-09-03 PROCEDURE — 6360000002 HC RX W HCPCS

## 2020-09-03 PROCEDURE — 36591 DRAW BLOOD OFF VENOUS DEVICE: CPT

## 2020-09-03 RX ORDER — HEPARIN SODIUM (PORCINE) LOCK FLUSH IV SOLN 100 UNIT/ML 100 UNIT/ML
SOLUTION INTRAVENOUS
Status: DISCONTINUED
Start: 2020-09-03 | End: 2020-09-04 | Stop reason: HOSPADM

## 2020-09-03 RX ORDER — HEPARIN SODIUM (PORCINE) LOCK FLUSH IV SOLN 100 UNIT/ML 100 UNIT/ML
500 SOLUTION INTRAVENOUS PRN
Status: DISCONTINUED | OUTPATIENT
Start: 2020-09-03 | End: 2020-09-04 | Stop reason: HOSPADM

## 2020-09-03 RX ORDER — SODIUM CHLORIDE 0.9 % (FLUSH) 0.9 %
10 SYRINGE (ML) INJECTION PRN
Status: DISCONTINUED | OUTPATIENT
Start: 2020-09-03 | End: 2020-09-04 | Stop reason: HOSPADM

## 2020-09-03 RX ADMIN — HEPARIN SODIUM (PORCINE) LOCK FLUSH IV SOLN 100 UNIT/ML 500 UNITS: 100 SOLUTION at 12:40

## 2020-09-03 RX ADMIN — SODIUM CHLORIDE 200 MG: 9 INJECTION, SOLUTION INTRAVENOUS at 12:07

## 2020-09-03 RX ADMIN — SODIUM CHLORIDE, PRESERVATIVE FREE 10 ML: 5 INJECTION INTRAVENOUS at 12:40

## 2020-09-03 NOTE — PROGRESS NOTES
Patient ambulated to treatment room with assist of cane. Gait slow and steady. Denies any history of falls. Appetite good. Bowels regular. Some trouble sleeping at times. CBCD done. Chemo given as ordered. Patient tolerated without any problems. RTC 3 weeks for chemo.

## 2020-10-02 ENCOUNTER — TELEPHONE (OUTPATIENT)
Dept: INFUSION THERAPY | Age: 55
End: 2020-10-02

## 2020-10-02 NOTE — TELEPHONE ENCOUNTER
LVM for number in pt's chart concerning her no show for tx on 9/24. Pt has a scheduled ov & tx on 10/15, asked them to call back if they would like her scheduled before this date.

## 2020-10-13 RX ORDER — SODIUM CHLORIDE 9 MG/ML
INJECTION, SOLUTION INTRAVENOUS CONTINUOUS
Status: CANCELLED | OUTPATIENT
Start: 2020-10-15

## 2020-10-13 RX ORDER — DIPHENHYDRAMINE HYDROCHLORIDE 50 MG/ML
50 INJECTION INTRAMUSCULAR; INTRAVENOUS ONCE
Status: CANCELLED | OUTPATIENT
Start: 2020-10-15

## 2020-10-13 RX ORDER — EPINEPHRINE 1 MG/ML
0.3 INJECTION, SOLUTION, CONCENTRATE INTRAVENOUS PRN
Status: CANCELLED | OUTPATIENT
Start: 2020-10-15

## 2020-10-13 RX ORDER — HEPARIN SODIUM (PORCINE) LOCK FLUSH IV SOLN 100 UNIT/ML 100 UNIT/ML
500 SOLUTION INTRAVENOUS PRN
Status: CANCELLED | OUTPATIENT
Start: 2020-10-15

## 2020-10-13 RX ORDER — SODIUM CHLORIDE 0.9 % (FLUSH) 0.9 %
10 SYRINGE (ML) INJECTION PRN
Status: CANCELLED | OUTPATIENT
Start: 2020-10-15

## 2020-10-13 RX ORDER — MEPERIDINE HYDROCHLORIDE 25 MG/ML
12.5 INJECTION INTRAMUSCULAR; INTRAVENOUS; SUBCUTANEOUS ONCE
Status: CANCELLED | OUTPATIENT
Start: 2020-10-15

## 2020-10-13 RX ORDER — SODIUM CHLORIDE 0.9 % (FLUSH) 0.9 %
5 SYRINGE (ML) INJECTION PRN
Status: CANCELLED | OUTPATIENT
Start: 2020-10-15

## 2020-10-13 RX ORDER — SODIUM CHLORIDE 9 MG/ML
20 INJECTION, SOLUTION INTRAVENOUS ONCE
Status: CANCELLED | OUTPATIENT
Start: 2020-10-15

## 2020-10-13 RX ORDER — METHYLPREDNISOLONE SODIUM SUCCINATE 125 MG/2ML
125 INJECTION, POWDER, LYOPHILIZED, FOR SOLUTION INTRAMUSCULAR; INTRAVENOUS ONCE
Status: CANCELLED | OUTPATIENT
Start: 2020-10-15

## 2020-10-15 ENCOUNTER — OFFICE VISIT (OUTPATIENT)
Dept: ONCOLOGY | Age: 55
End: 2020-10-15
Payer: MEDICARE

## 2020-10-15 ENCOUNTER — HOSPITAL ENCOUNTER (OUTPATIENT)
Dept: INFUSION THERAPY | Age: 55
Discharge: HOME | End: 2020-10-15
Payer: MEDICARE

## 2020-10-15 VITALS
OXYGEN SATURATION: 95 % | BODY MASS INDEX: 26.15 KG/M2 | RESPIRATION RATE: 16 BRPM | TEMPERATURE: 97.2 F | SYSTOLIC BLOOD PRESSURE: 156 MMHG | HEIGHT: 66 IN | HEART RATE: 85 BPM | DIASTOLIC BLOOD PRESSURE: 87 MMHG

## 2020-10-15 DIAGNOSIS — C34.92 CARCINOMA OF LEFT LUNG (HCC): ICD-10-CM

## 2020-10-15 DIAGNOSIS — E03.9 HYPOTHYROIDISM, UNSPECIFIED TYPE: Primary | ICD-10-CM

## 2020-10-15 LAB
ALBUMIN SERPL-MCNC: 4.2 GM/DL (ref 3.4–5)
ALP BLD-CCNC: 125 IU/L (ref 40–129)
ALT SERPL-CCNC: 12 U/L (ref 10–40)
ANION GAP SERPL CALCULATED.3IONS-SCNC: 13 MMOL/L (ref 4–16)
AST SERPL-CCNC: 18 IU/L (ref 15–37)
BASOPHILS ABSOLUTE: 0 K/CU MM
BASOPHILS RELATIVE PERCENT: 0.4 % (ref 0–1)
BILIRUB SERPL-MCNC: 0.2 MG/DL (ref 0–1)
BUN BLDV-MCNC: 13 MG/DL (ref 6–23)
CALCIUM SERPL-MCNC: 8.4 MG/DL (ref 8.3–10.6)
CHLORIDE BLD-SCNC: 103 MMOL/L (ref 99–110)
CO2: 24 MMOL/L (ref 21–32)
CREAT SERPL-MCNC: 0.8 MG/DL (ref 0.6–1.1)
DIFFERENTIAL TYPE: ABNORMAL
EOSINOPHILS ABSOLUTE: 0.2 K/CU MM
EOSINOPHILS RELATIVE PERCENT: 1.9 % (ref 0–3)
GFR AFRICAN AMERICAN: >60 ML/MIN/1.73M2
GFR NON-AFRICAN AMERICAN: >60 ML/MIN/1.73M2
GLUCOSE BLD-MCNC: 83 MG/DL (ref 70–99)
HCT VFR BLD CALC: 39.7 % (ref 37–47)
HEMOGLOBIN: 13 GM/DL (ref 12.5–16)
LYMPHOCYTES ABSOLUTE: 2.8 K/CU MM
LYMPHOCYTES RELATIVE PERCENT: 33.3 % (ref 24–44)
MCH RBC QN AUTO: 29.5 PG (ref 27–31)
MCHC RBC AUTO-ENTMCNC: 32.7 % (ref 32–36)
MCV RBC AUTO: 90 FL (ref 78–100)
MONOCYTES ABSOLUTE: 0.7 K/CU MM
MONOCYTES RELATIVE PERCENT: 8 % (ref 0–4)
PDW BLD-RTO: 14.1 % (ref 11.7–14.9)
PLATELET # BLD: 291 K/CU MM (ref 140–440)
PMV BLD AUTO: 9.4 FL (ref 7.5–11.1)
POTASSIUM SERPL-SCNC: 3.6 MMOL/L (ref 3.5–5.1)
RBC # BLD: 4.41 M/CU MM (ref 4.2–5.4)
SEGMENTED NEUTROPHILS ABSOLUTE COUNT: 4.8 K/CU MM
SEGMENTED NEUTROPHILS RELATIVE PERCENT: 56.4 % (ref 36–66)
SODIUM BLD-SCNC: 140 MMOL/L (ref 135–145)
TOTAL PROTEIN: 6.3 GM/DL (ref 6.4–8.2)
TSH HIGH SENSITIVITY: 1.09 UIU/ML (ref 0.27–4.2)
WBC # BLD: 8.5 K/CU MM (ref 4–10.5)

## 2020-10-15 PROCEDURE — 99213 OFFICE O/P EST LOW 20 MIN: CPT | Performed by: NURSE PRACTITIONER

## 2020-10-15 PROCEDURE — 6360000002 HC RX W HCPCS: Performed by: INTERNAL MEDICINE

## 2020-10-15 PROCEDURE — 84443 ASSAY THYROID STIM HORMONE: CPT

## 2020-10-15 PROCEDURE — 85025 COMPLETE CBC W/AUTO DIFF WBC: CPT

## 2020-10-15 PROCEDURE — 2580000003 HC RX 258: Performed by: INTERNAL MEDICINE

## 2020-10-15 PROCEDURE — 80053 COMPREHEN METABOLIC PANEL: CPT

## 2020-10-15 PROCEDURE — 96413 CHEMO IV INFUSION 1 HR: CPT

## 2020-10-15 PROCEDURE — 36591 DRAW BLOOD OFF VENOUS DEVICE: CPT

## 2020-10-15 RX ORDER — HEPARIN SODIUM (PORCINE) LOCK FLUSH IV SOLN 100 UNIT/ML 100 UNIT/ML
500 SOLUTION INTRAVENOUS PRN
Status: DISCONTINUED | OUTPATIENT
Start: 2020-10-15 | End: 2020-10-16 | Stop reason: HOSPADM

## 2020-10-15 RX ORDER — SODIUM CHLORIDE 9 MG/ML
20 INJECTION, SOLUTION INTRAVENOUS ONCE
Status: DISCONTINUED | OUTPATIENT
Start: 2020-10-15 | End: 2020-10-16 | Stop reason: HOSPADM

## 2020-10-15 RX ADMIN — Medication 500 UNITS: at 12:35

## 2020-10-15 RX ADMIN — SODIUM CHLORIDE 200 MG: 9 INJECTION, SOLUTION INTRAVENOUS at 12:04

## 2020-10-15 RX ADMIN — SODIUM CHLORIDE 20 ML/HR: 9 INJECTION, SOLUTION INTRAVENOUS at 12:04

## 2020-10-15 ASSESSMENT — PATIENT HEALTH QUESTIONNAIRE - PHQ9
SUM OF ALL RESPONSES TO PHQ QUESTIONS 1-9: 0
1. LITTLE INTEREST OR PLEASURE IN DOING THINGS: 0
SUM OF ALL RESPONSES TO PHQ QUESTIONS 1-9: 0
SUM OF ALL RESPONSES TO PHQ QUESTIONS 1-9: 0
2. FEELING DOWN, DEPRESSED OR HOPELESS: 0
SUM OF ALL RESPONSES TO PHQ9 QUESTIONS 1 & 2: 0

## 2020-10-15 NOTE — PROGRESS NOTES
Here for office visit and treatment. States doing well. No complaints voiced. Labs drawn from [address] [facility]. CBC results reviewed with patient. Treatment administrated as ordered. Tolerated well.

## 2020-10-15 NOTE — PROGRESS NOTES
MA Rooming Questions  Patient: Sheryl   MRN: [id number]    Date: 10/15/2020        1. Do you have any new issues?   no         2. Do you need any refills on medications?    no    3. Have you had any imaging done since your last visit?   no    4. Have you been hospitalized or seen in the emergency room since your last visit here?   no    5. Did the patient have a depression screening completed today?  Yes    PHQ-9 Total Score: 0 (10/15/2020 11:08 AM)       PHQ-9 Given to (if applicable):               PHQ-9 Score (if applicable):                     [] Positive     []  Negative              Does question #9 need addressed (if applicable)                     [] Yes    []  No               Matt Boucher MA

## 2020-10-15 NOTE — PROGRESS NOTES
Patient Name: Julia Meléndez  Patient : 1965  Patient MRN: [id number]     Primary Oncologist: Nisha Coffman MD  Referring Physician: Josh Bruno     Date of Service: 20       Chief Complaint:   Chief Complaint   Patient presents with    Chemotherapy        Active Problem list  No diagnosis found. HPI:   She is a very pleasant  lady [ 1965] with a 35-pack year history of smoking. She has a longstanding cough and shortness of breath related to her smoking; however, it got worse in late 2016 and she was treated as possible infectious episode, but did not improve., 2.  chest x-ray suggested pneumonia with a left upper lobe lesion 6.5 cm. CT chest done on 2016, showed a left upper lobe lung lesion measuring 6.5 by 5 cm, with extension to the first and second ribs posteriorly and bony erosion. She thus underwent CT-guided biopsy of the mass on 2016. Preliminary pathology found it to be a poorly differentiated adenocarcinoma. EGFR Negative, ALK & ROS-1 could not be done for lack of enough cells. CEA 2.2. ,  PET-CT, revealed the left upper lobe mass, which shows avid on PET-CT with extension into the left third rib, as well as to the spinous process of T3, however, felt to be resectable by Dr. Mahesh Perez. but he was not in her insurance group,  so she was referred to Dr. Argelia Dumont in [facility], who evaluated her on  and again on 2016. Additional workup done at [facility], included MRI of the chest, which showed a large Left upper lobe apical mass invading the left T3 transverse process and partially destroying the posterior left third rib. Mass abuts the undersurface of left second rib with invasion and completely fills the left T2-3 neural foramen. It did not invade or abut the undersurface of left brachial plexus, 5 mm away from that.   Dr. Argelia Dumont discussed the case with me after they had discussion at the Tumor Board and [facility] peer review, neurosurgical opinion, Romelia Estes, and it was felt that neoadjuvant chemotherapy versus chemoradiation could be considered and then surgical removal after that. The second option was to give only neoadjuvant chemotherapy and then surgery, which will be easier if the radiation was not given preop and consider postop radiation plus additional adjuvant chemotherapy, depending on the final pathology upon resection. She also had CT of the chest with contrast and CT of the thoracic spine without contrast on September 22, 2016, which confirmed the interval mild increase in the size of the large left upper lobe mass, within the medial left apex, extending into the posterior segment of left upper lobe, invading mediastinum, left posterior third rib, and left T3 transverse process, mass completely filling the T2-3 neural foramen, left hilar adenopathy, likely metastatic, and mildly enlarged subcarinal lymph node concerning for mets, a small nodule within the left upper lobe concerning for local mets. she underwent EBUS procedure with pollo biopsy, which turned out to be negative. MRI of the brain showed encephalomalacia in the left frontal temporal lobe, occlusion in the left internal carotid artery, and wallerian degeneration on the left. PET-CT August 18, 4942, showed metabolically active left upper lobe mass with local chest wall invasion and left posterior third rib and adjacent soft tissue, mildly asymmetrical activity in the left hilum, possibly could be reactive. No distant mets. Findings suggestive of left MCA distribution infarct. PFTs showed FEV1 of 2.06, DLCO/VA 82 percent.    Final stage IIIa [T4N0M0]   I agreed with the plan of neoadjuvant chemotherapy with carboplatinum and Alimta for three to four courses and depending upon the initial response after a couple of doses, we will do imaging again to see if the tumor is responding and if so, we will continue with four courses and follow it up with surgery, to be followed by adjuvant chemotherapy and radiation therapy. I preferred  not to use a taxane because of her neuropathy, for which she is currently on gabapentin, and previous stroke. After two courses of neoadjuvant chemotherapy with carbo/Alimta, she did get a CT of the chest December 7, 2016, which did show a decrease in the size of her tumor in the left upper lobe from 7.1 by 4.5 to 5.4 by 3.4 cm in size, indicating a partial response. However, she also showed evidence of some silent right lower lobe pulmonary emboli. These were not seen in the previous CT; however, that was done without contrast and not seen on the PET-CT, also, for the same reason. She is not very symptomatic and has no chest pain, shortness of breath, or significant cough. No leg or arm pain or swelling. Bilateral lower extremity ultrasounds, showed  No evidence of DVT in either lower extremity. CTA of her chest was done on January 17, 2017. No residual PE was found. The mass in the left suprahilar area now measured 8.7 by 4.3 compared to 6 by 5 cm before. Mild patchy opacity in the right lower lobe, possible atelectasis, also moderate emphysema. CT of the neck showed occluded left common carotid artery and internal carotid artery. Partially visualized left lung mass with invasion of the posterior chest wall. CT of the abdomen and pelvis showed no evidence of any metastasis in the abdomen or pelvis. There is a stable sclerotic lesion SI joint, maybe degenerative, and a questionable L3 sclerotic lesion. PET-CT February 16, 2017, showed progression of the large paramediastinal mass at the left apex 9.7 by 5 cm versus 6.7 by 4 previously, and mets into cervical lymph nodes, retroclavicular, and left axillary lymph nodes, right tonsillar uptake.   This indicated PD after 1st line Chemotherapy with Carbo/Alimta   She had a repeat biopsy of lung mass on February 13, 2017, and sent for [facility] testing which showed additional genomic alterations, including met amplification, which would make her eligible for crizotinib therapy, if she fails to pembro. She also could be a candidate for cabozantinib through a clinical trial.  PD-L1 antibodies came back positive 80% expression for pembrolizumab, which according to the NCCN guidelines, would even make her a first-line therapy for metastatic non-small cell carcinoma of the lung. EGFR was negative. ALK was also negative ROS1 also negative. Because of her high tumor mutation burden (high TMB), 32 mutations/MB, she has been a good candidate for immunotherapy with Nivolumab, pembrolizumab, or atezolizumab. Her PD-L1 expression was high, 80 percent through [facility] and 60 percent through [address] [place] [address]. [place]. She was thus started on Pembro[Keytruda]on March 6, 2017, tolerating well. She also Dr. Lucas Loo in February after her PET/CT and because she was felt to be unresectable at that point and was asymptomatic, he did not feel immediate radiation was indicated because of lack of symptoms and it was postponed to do that unless she became symptomatic. After 4 doses of Pembro, CAT scan of the chest done on May 12, 2017, showed no evidence of pulmonary embolism. Significant reduction in the size of malignant left apical mass compared with January of 2017. Increase in the amount of mediastinal and right hilar adenopathy since the prior study, possibly reactive and indeterminate in nature. There is a 6.6 mm right middle lobe pulmonary nodule, indeterminate. Mild right lower lobe atelectasis, bullous changes in the lungs. The left apical mass invading the mediastinum, as well as posterior chest wall. 5-18  CT CAP:  negative for disease   11-18 CT CAP negative  3-19   CT CAP negative   5-19  MRI brain negative   8-19  CT abdomen negative.     8-19  CT chest negative  1/17/20: CT chest, abdomen and pelvis neg for mets   7/20/2020 CT scan of the chest abdomen pelvis showed left lower lobe scarring otherwise no evidence of metastatic disease in the chest, abdomen or pelvis. She has not had any febrile from 6/12/2022 8/14/2020 due to cold pandemic. PAST MEDICAL HISTORY:  1. Hypertension, for which she takes no medication. 2. History of CVA in July of 2008, with right hemiparesis and speech involvement. After rehab, she is able to walk with the help of a cane. Speech is still impaired, with ongoing residual weakness. She is able to walk with a cane. 3. She is taking gabapentin for neuropathy. 4. Zoloft for depression and trazadone at night. 5. For high cholesterol she takes simvastatin. 6. History of irritable bowel syndrome. 7. Fibromyalgia., 8. platelet count had jumped up to more than a million in Nov 2016. Because of her previous history of stroke, I did check for JAK2 gene mutation to make sure she did not have underlying polycythemia or a myeloproliferative process in addition. JAK2 came back negative,  9. In Jan 2017,hemoglobin slowly dropped from 10 to 8.4 and a couple of days ago her hemoglobin was felt to be dropping to 6.3 from the hospital lab, with hematocrit dropping from 27 to 20,  10. Mammogram May 16, 2017, showed category 1, negative    PAST SURGICAL HISTORY:  1. Tubal ligation. 2. Appendectomy. 3. Tonsillectomy. 4. Carpal tunnel release. 5. D&C. FAMILY HISTORY:  Paternal grandmother and paternal aunt had breast cancer, maternal grandmother had uterine cancer. She has two sisters and one brother Wicho Miller. PERSONAL HISTORY:  She has a 35-pack year history of smoking, moderate to heavy drinking in the past, but quit 20 years ago. She has been  for 25 years. 3 children.  She has one daughter, Yoni Lyles, and two sons Max Workman &.      Current Therapy  Pembrolizumab D1 Q21D (HNCC, NSCLC)X12 Cycle Length: 21 Number Cycles: 47 Start: [place] on 03/06/2017 Assoc Dx: Non-small cell lung cancer (disorder) LOT: 2nd Line Metastatic 2017 C10 D1  Pembrolizumab IV, 200 mg  OHC Hydration Cycle Length: 1 Number Cycles: 1 Start: [place] Born on 2018 Assoc Dx: Dehydration LOT: Other 2018 C1 D1  Sodium Chloride IV 0.9 %, .9 % 1000 mL, Dose modified    Interval History  10/15/2020 she arrived with her sister to the clinic today. Reports energy and appetite are intact. She reports depression continues to be an issue but PCP is managing this. Denied any fever or any cough. Denied any chest pain. No shortness of breath or chest pain. No hemoptysis. Denied any palpitations or dizziness. Denied any dysphagia odynophagia. No new headache or bone pain. Denied any bleeding or any rash. Denied any abdominal pain, nausea, vomiting, diarrhea, constipation. Moving her bowels one to two times daily. Reports occasional lower etremity edema.     Review of Systems     Per interval history; otherwise 10 point ROS is negative    Vital Signs:  BP (!) 156/87 (Site: Left Upper Arm, Position: Sitting, Cuff Size: Medium Adult)   Pulse 85   Temp 97.2 °F (36.2 °C) (Infrared)   Resp 16   Ht 5' 6\" (1.676 m)   SpO2 95%   BMI 26.15 kg/m²     Physical Exam:    CONSTITUTIONAL:aao x 3,walks with cane  EYES:No palor or any icterus  ENT: ATNC  NECK: No JVD  HEMATOLOGIC/LYMPHATIC: no cervical, supraclavicular or axillary lymphadenopathy   LUNGS: right upper lung coarse, otherwise even and unlabored  CARDIOVASCULAR: s1s2 rrr no murmurs  ABDOMEN: soft nd nt bs pos  NEUROLOGIC: left hemiparesis  SKIN: No rash  EXTREMITIES: trace LE edema bilaterally; clarice's negative, no redness    Labs:    Hematology:  Lab Results   Component Value Date    WBC 8.5 10/15/2020    RBC 4.41 10/15/2020    HGB 13.0 10/15/2020    HCT 39.7 10/15/2020    MCV 90.0 10/15/2020    MCH 29.5 10/15/2020    MCHC 32.7 10/15/2020    RDW 14.1 10/15/2020     10/15/2020    MPV 9.4 10/15/2020    BANDSPCT 1 (L) 2017    SEGSPCT 56.4 10/15/2020    EOSRELPCT 1.9 10/15/2020    BASOPCT 0.4 10/15/2020    LYMPHOPCT 33.3 10/15/2020    MONOPCT 8.0 (H) 10/15/2020    BANDABS 0.05 01/16/2017    SEGSABS 4.8 10/15/2020    EOSABS 0.2 10/15/2020    BASOSABS 0.0 10/15/2020    LYMPHSABS 2.8 10/15/2020    MONOSABS 0.7 10/15/2020    DIFFTYPE AUTOMATED DIFFERENTIAL 10/15/2020    POLYCHROM 1+ 01/18/2017    PLTM PLATELETS APPEAR INCREASED 01/18/2017     No results found for: ESR    Chemistry:  Lab Results   Component Value Date     09/03/2020    K 3.9 09/03/2020     09/03/2020    CO2 23 09/03/2020    BUN 18 09/03/2020    CREATININE 0.9 09/03/2020    GLUCOSE 107 (H) 09/03/2020    CALCIUM 8.6 09/03/2020    PROT 6.7 09/03/2020    LABALBU 4.4 09/03/2020    BILITOT 0.2 09/03/2020    ALKPHOS 135 (H) 09/03/2020    AST 16 09/03/2020    ALT 13 09/03/2020    LABGLOM >60 09/03/2020    GFRAA >60 09/03/2020    PHOS 3.0 02/17/2019    MG 2.2 02/18/2019     Lab Results   Component Value Date     08/13/2020     No components found for: LD  Lab Results   Component Value Date    TSHHS 1.620 11/07/2019    T4FREE 0.99 11/07/2019    FT3 2.4 06/20/2017       Immunology:  Lab Results   Component Value Date    PROT 6.7 09/03/2020     No results found for: Jaynee Gemma, KLFLCR  No results found for: B2M    Coagulation Panel:  Lab Results   Component Value Date    PROTIME 15.7 02/13/2017    INR 1.9 (H) 08/22/2017    APTT 32.7 02/13/2017    DDIMER 962 (H) 12/07/2016       Anemia Panel:  Lab Results   Component Value Date    OIWYZPDU19 3838 (H) 01/18/2017    FOLATE >20.0 (H) 01/18/2017       Tumor Markers:  Lab Results   Component Value Date    CEA 1.7 04/12/2017         Imaging: Reviewed     Pathology:Reviewed     Observations:  Performance Status: ECOG 2  Depression Status: PHQ 9 Positive. Is on zoloft and follows with PCP. Deferred no further intervention        Assessment & Plan:  1.   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 A repeat biopsy and [facility] results showed complex karyotype, as above, with multiple mutations plus high PD-L1 expression. Second-line treatment with immunotherapy, pembrolizumab started in March 2017. She seems to be responding very well to that with good initial response. Based on the PET-CT results, it looks like she has shown an excellent response to her pembrolizumab therapy  She has shown near complete remission with the left apical mass decreasing from 9.5 to 4 cm, but SUV dropped from 29 to 3.5. No other evidence of disease elsewhere. We discussed her case in the Tumor Board, also, and it seems that she is not a candidate for surgery as determined in February of 2017. Her PD-L1 expression was 80 percent  She continues to be on pembrolizumab with the continued excellent treatment response. Plan to continue pembrolizumab until adverse effects of progressive disease. We will continue to repeat CT scan of the chest every 6 to 12 months. Last scan 7/2020 as above.   -no immune mediated or grade three toxicities  -cbc reviewed we will proceed with treatment today     2. Pulmonary emboli:  She was found to have silent PE on initial evaluation on CAT scan and thus had been on Coumadin for more than a year. she had some bloody stools and bruising which resolved when we stopped the coumadin. Diarrhea  -etiology?   resolved when took break from [facility]  -c scope 6-18 negative  -resolved; no issues with constipation      depression  -worsened due to situational issues(granddaughter molested)  perpertrator incarcerated and Zoloft was increased to 150  -since dosage increase patient has been staying in her room more, but more agitated/upset  -not sure if this is more psych related vs  did the increase in dose of her zoloft lead to her weight gain which has  led her to stay in her room more  -Symptoms have been stable on 150 mg of Zoloft. Is being followed by primary care physician. Allergic sinusitis: Claritin prescribed. Flonase does not really help    Continue other medical care    Discussed the above findings and plan with her and she seems to have verbalized understanding    Recommend excercise as tolerated, healthy diet and meal plan. Also discussed the importance of being uptodate with age appropriate screening tools including mammogram, pap smear    Recommend follow up with PCP and other specialists. Answered all question    RTC august 12/17/20  or earlier if new Sx. I have recommended that the patient follow CDC guidelines for prevention of COVID-19 infection.

## 2020-11-02 RX ORDER — SERTRALINE HYDROCHLORIDE 100 MG/1
TABLET, FILM COATED ORAL
Qty: 30 TABLET | Refills: 1 | Status: SHIPPED | OUTPATIENT
Start: 2020-11-02 | End: 2021-01-27

## 2020-11-04 RX ORDER — SODIUM CHLORIDE 0.9 % (FLUSH) 0.9 %
5 SYRINGE (ML) INJECTION PRN
Status: CANCELLED | OUTPATIENT
Start: 2020-11-05

## 2020-11-04 RX ORDER — SODIUM CHLORIDE 9 MG/ML
INJECTION, SOLUTION INTRAVENOUS CONTINUOUS
Status: CANCELLED | OUTPATIENT
Start: 2020-11-05

## 2020-11-04 RX ORDER — DIPHENHYDRAMINE HYDROCHLORIDE 50 MG/ML
50 INJECTION INTRAMUSCULAR; INTRAVENOUS ONCE
Status: CANCELLED | OUTPATIENT
Start: 2020-11-05

## 2020-11-04 RX ORDER — MEPERIDINE HYDROCHLORIDE 25 MG/ML
12.5 INJECTION INTRAMUSCULAR; INTRAVENOUS; SUBCUTANEOUS ONCE
Status: CANCELLED | OUTPATIENT
Start: 2020-11-05

## 2020-11-04 RX ORDER — METHYLPREDNISOLONE SODIUM SUCCINATE 125 MG/2ML
125 INJECTION, POWDER, LYOPHILIZED, FOR SOLUTION INTRAMUSCULAR; INTRAVENOUS ONCE
Status: CANCELLED | OUTPATIENT
Start: 2020-11-05

## 2020-11-04 RX ORDER — EPINEPHRINE 1 MG/ML
0.3 INJECTION, SOLUTION, CONCENTRATE INTRAVENOUS PRN
Status: CANCELLED | OUTPATIENT
Start: 2020-11-05

## 2020-11-05 ENCOUNTER — HOSPITAL ENCOUNTER (OUTPATIENT)
Dept: INFUSION THERAPY | Age: 55
Discharge: HOME | End: 2020-11-05
Payer: MEDICARE

## 2020-11-05 ENCOUNTER — TELEPHONE (OUTPATIENT)
Dept: INFUSION THERAPY | Age: 55
End: 2020-11-05

## 2020-11-05 VITALS
SYSTOLIC BLOOD PRESSURE: 152 MMHG | HEIGHT: 62 IN | WEIGHT: 160 LBS | TEMPERATURE: 97 F | RESPIRATION RATE: 16 BRPM | OXYGEN SATURATION: 92 % | DIASTOLIC BLOOD PRESSURE: 86 MMHG | BODY MASS INDEX: 29.44 KG/M2 | HEART RATE: 86 BPM

## 2020-11-05 DIAGNOSIS — E03.9 HYPOTHYROIDISM, UNSPECIFIED TYPE: Primary | ICD-10-CM

## 2020-11-05 DIAGNOSIS — C34.92 CARCINOMA OF LEFT LUNG (HCC): ICD-10-CM

## 2020-11-05 LAB
ALBUMIN SERPL-MCNC: 4.2 GM/DL (ref 3.4–5)
ALP BLD-CCNC: 131 IU/L (ref 40–129)
ALT SERPL-CCNC: 11 U/L (ref 10–40)
ANION GAP SERPL CALCULATED.3IONS-SCNC: 12 MMOL/L (ref 4–16)
AST SERPL-CCNC: 18 IU/L (ref 15–37)
BASOPHILS ABSOLUTE: 0 K/CU MM
BASOPHILS RELATIVE PERCENT: 0.3 % (ref 0–1)
BILIRUB SERPL-MCNC: 0.2 MG/DL (ref 0–1)
BUN BLDV-MCNC: 14 MG/DL (ref 6–23)
CALCIUM SERPL-MCNC: 8.5 MG/DL (ref 8.3–10.6)
CHLORIDE BLD-SCNC: 98 MMOL/L (ref 99–110)
CO2: 23 MMOL/L (ref 21–32)
CREAT SERPL-MCNC: 0.9 MG/DL (ref 0.6–1.1)
DIFFERENTIAL TYPE: ABNORMAL
EOSINOPHILS ABSOLUTE: 0.2 K/CU MM
EOSINOPHILS RELATIVE PERCENT: 2.4 % (ref 0–3)
GFR AFRICAN AMERICAN: >60 ML/MIN/1.73M2
GFR NON-AFRICAN AMERICAN: >60 ML/MIN/1.73M2
GLUCOSE BLD-MCNC: 97 MG/DL (ref 70–99)
HCT VFR BLD CALC: 39.9 % (ref 37–47)
HEMOGLOBIN: 13 GM/DL (ref 12.5–16)
LYMPHOCYTES ABSOLUTE: 3 K/CU MM
LYMPHOCYTES RELATIVE PERCENT: 32.5 % (ref 24–44)
MCH RBC QN AUTO: 29.3 PG (ref 27–31)
MCHC RBC AUTO-ENTMCNC: 32.6 % (ref 32–36)
MCV RBC AUTO: 90.1 FL (ref 78–100)
MONOCYTES ABSOLUTE: 0.8 K/CU MM
MONOCYTES RELATIVE PERCENT: 8.2 % (ref 0–4)
PDW BLD-RTO: 14.4 % (ref 11.7–14.9)
PLATELET # BLD: 288 K/CU MM (ref 140–440)
PMV BLD AUTO: 9.3 FL (ref 7.5–11.1)
POTASSIUM SERPL-SCNC: 3.9 MMOL/L (ref 3.5–5.1)
RBC # BLD: 4.43 M/CU MM (ref 4.2–5.4)
SEGMENTED NEUTROPHILS ABSOLUTE COUNT: 5.3 K/CU MM
SEGMENTED NEUTROPHILS RELATIVE PERCENT: 56.6 % (ref 36–66)
SODIUM BLD-SCNC: 133 MMOL/L (ref 135–145)
TOTAL PROTEIN: 6.6 GM/DL (ref 6.4–8.2)
TSH HIGH SENSITIVITY: 2.19 UIU/ML (ref 0.27–4.2)
WBC # BLD: 9.3 K/CU MM (ref 4–10.5)

## 2020-11-05 PROCEDURE — 85025 COMPLETE CBC W/AUTO DIFF WBC: CPT

## 2020-11-05 PROCEDURE — 6360000002 HC RX W HCPCS: Performed by: INTERNAL MEDICINE

## 2020-11-05 PROCEDURE — 96417 CHEMO IV INFUS EACH ADDL SEQ: CPT

## 2020-11-05 PROCEDURE — 80053 COMPREHEN METABOLIC PANEL: CPT

## 2020-11-05 PROCEDURE — 84443 ASSAY THYROID STIM HORMONE: CPT

## 2020-11-05 PROCEDURE — 36591 DRAW BLOOD OFF VENOUS DEVICE: CPT

## 2020-11-05 PROCEDURE — 2580000003 HC RX 258: Performed by: INTERNAL MEDICINE

## 2020-11-05 PROCEDURE — 96413 CHEMO IV INFUSION 1 HR: CPT

## 2020-11-05 RX ORDER — SODIUM CHLORIDE 0.9 % (FLUSH) 0.9 %
10 SYRINGE (ML) INJECTION PRN
Status: DISCONTINUED | OUTPATIENT
Start: 2020-11-05 | End: 2020-11-06 | Stop reason: HOSPADM

## 2020-11-05 RX ORDER — HEPARIN SODIUM (PORCINE) LOCK FLUSH IV SOLN 100 UNIT/ML 100 UNIT/ML
500 SOLUTION INTRAVENOUS PRN
Status: DISCONTINUED | OUTPATIENT
Start: 2020-11-05 | End: 2020-11-06 | Stop reason: HOSPADM

## 2020-11-05 RX ORDER — SODIUM CHLORIDE 9 MG/ML
20 INJECTION, SOLUTION INTRAVENOUS ONCE
Status: DISCONTINUED | OUTPATIENT
Start: 2020-11-05 | End: 2020-11-06 | Stop reason: HOSPADM

## 2020-11-05 RX ADMIN — Medication 500 UNITS: at 12:57

## 2020-11-05 RX ADMIN — SODIUM CHLORIDE 20 ML/HR: 9 INJECTION, SOLUTION INTRAVENOUS at 12:15

## 2020-11-05 RX ADMIN — SODIUM CHLORIDE 200 MG: 9 INJECTION, SOLUTION INTRAVENOUS at 12:16

## 2020-11-05 RX ADMIN — SODIUM CHLORIDE, PRESERVATIVE FREE 10 ML: 5 INJECTION INTRAVENOUS at 12:57

## 2020-11-05 NOTE — PROGRESS NOTES
Here for treatment. No complaints voiced. Labs drawn from [address] At [facility]. CBC results reviewed with patient. Keytruda administrated as ordered. Tolerated well. AVS provided.

## 2020-11-05 NOTE — TELEPHONE ENCOUNTER
Called & spoke with pt's sister to give her next tx/ov appt for 12/17 @ 1:30. Per Dr. Celeste Hamilton pt can take a chemo holiday as her tx would be due on 11/26; sister voiced understanding. Wound Care: Vaseline Destruction After The Procedure: No Detail Level: Detailed Lab: [facility] Cryotherapy Text: The wound bed was treated with cryotherapy after the biopsy was performed. Was A Bandage Applied: Yes Anesthesia Volume In Cc (Will Not Render If 0): 0.5 Biopsy Method: Personna blade Type Of Destruction Used: Curettage Lab Facility: [facility] Electrodesiccation Text: The wound bed was treated with electrodesiccation after the biopsy was performed. Post-Care Instructions: I reviewed with the patient in detail post-care instructions. Patient is to keep the biopsy site dry overnight, and then apply bacitracin twice daily until healed. Patient may apply hydrogen peroxide soaks to remove any crusting. Dressing: pressure dressing with telfa Size Of Lesion In Cm: 0.2 Path Notes (To The Dermatopathologist): 0.2 cm Billing Type: [employer] Electrodesiccation And Curettage Text: The wound bed was treated with electrodesiccation and curettage after the biopsy was performed. Notification Instructions: Patient will be notified of biopsy results. However, patient instructed to call the office if not contacted within 2 weeks. Hemostasis: Aluminum Chloride Consent: The provider's intent is to obtain a tissue sample solely for diagnostic purposes. Written consent to obtain tissue sample was obtained and risks were reviewed including but not limited to scarring, infection, bleeding, scabbing, incomplete removal, nerve damage and allergy to anesthesia. Anesthesia Type: 1% lidocaine with epinephrine and a 1:10 solution of 8.4% sodium bicarbonate X Size Of Lesion In Cm: 0 Depth Of Biopsy: dermis Silver Nitrate Text: The wound bed was treated with silver nitrate after the biopsy was performed. Biopsy Type: H and E Curettage Text: The wound bed was treated with curettage after the biopsy was performed.

## 2021-01-13 ENCOUNTER — TELEPHONE (OUTPATIENT)
Dept: INFUSION THERAPY | Age: 56
End: 2021-01-13

## 2021-01-13 NOTE — TELEPHONE ENCOUNTER
LVM for pt's sister, Nasra Schultz stating we can do tx on 1/29 same day as pt's ov; Pt will need to arrive at 11:30. Any additional questions can be left on my direct line & I will check for messages.

## 2021-01-26 DIAGNOSIS — E03.9 HYPOTHYROIDISM, UNSPECIFIED TYPE: Primary | ICD-10-CM

## 2021-01-27 RX ORDER — SERTRALINE HYDROCHLORIDE 100 MG/1
TABLET, FILM COATED ORAL
Qty: 30 TABLET | Refills: 1 | Status: SHIPPED | OUTPATIENT
Start: 2021-01-27 | End: 2021-03-30

## 2021-01-28 RX ORDER — MEPERIDINE HYDROCHLORIDE 25 MG/ML
12.5 INJECTION INTRAMUSCULAR; INTRAVENOUS; SUBCUTANEOUS ONCE
Status: CANCELLED | OUTPATIENT
Start: 2021-01-29 | End: 2021-01-29

## 2021-01-28 RX ORDER — DIPHENHYDRAMINE HYDROCHLORIDE 50 MG/ML
50 INJECTION INTRAMUSCULAR; INTRAVENOUS ONCE
Status: CANCELLED | OUTPATIENT
Start: 2021-01-29 | End: 2021-01-29

## 2021-01-28 RX ORDER — SODIUM CHLORIDE 0.9 % (FLUSH) 0.9 %
5 SYRINGE (ML) INJECTION PRN
Status: CANCELLED | OUTPATIENT
Start: 2021-01-29

## 2021-01-28 RX ORDER — SODIUM CHLORIDE 9 MG/ML
INJECTION, SOLUTION INTRAVENOUS CONTINUOUS
Status: CANCELLED | OUTPATIENT
Start: 2021-01-29

## 2021-01-28 RX ORDER — EPINEPHRINE 1 MG/ML
0.3 INJECTION, SOLUTION, CONCENTRATE INTRAVENOUS PRN
Status: CANCELLED | OUTPATIENT
Start: 2021-01-29

## 2021-01-28 RX ORDER — METHYLPREDNISOLONE SODIUM SUCCINATE 125 MG/2ML
125 INJECTION, POWDER, LYOPHILIZED, FOR SOLUTION INTRAMUSCULAR; INTRAVENOUS ONCE
Status: CANCELLED | OUTPATIENT
Start: 2021-01-29 | End: 2021-01-29

## 2021-01-29 ENCOUNTER — HOSPITAL ENCOUNTER (OUTPATIENT)
Dept: INFUSION THERAPY | Age: 56
Discharge: HOME | End: 2021-01-29
Payer: MEDICARE

## 2021-01-29 ENCOUNTER — OFFICE VISIT (OUTPATIENT)
Dept: ONCOLOGY | Age: 56
End: 2021-01-29
Payer: MEDICARE

## 2021-01-29 VITALS
WEIGHT: 160 LBS | OXYGEN SATURATION: 97 % | TEMPERATURE: 97.6 F | HEART RATE: 95 BPM | SYSTOLIC BLOOD PRESSURE: 152 MMHG | HEIGHT: 62 IN | BODY MASS INDEX: 29.44 KG/M2 | DIASTOLIC BLOOD PRESSURE: 80 MMHG

## 2021-01-29 DIAGNOSIS — E03.9 HYPOTHYROIDISM, UNSPECIFIED TYPE: Primary | ICD-10-CM

## 2021-01-29 DIAGNOSIS — C34.92 CARCINOMA OF LEFT LUNG (HCC): ICD-10-CM

## 2021-01-29 DIAGNOSIS — C34.92 CARCINOMA OF LEFT LUNG (HCC): Primary | ICD-10-CM

## 2021-01-29 LAB
ALBUMIN SERPL-MCNC: 4.2 GM/DL (ref 3.4–5)
ALP BLD-CCNC: 112 IU/L (ref 40–128)
ALT SERPL-CCNC: 16 U/L (ref 10–40)
ANION GAP SERPL CALCULATED.3IONS-SCNC: 15 MMOL/L (ref 4–16)
AST SERPL-CCNC: 17 IU/L (ref 15–37)
BASOPHILS ABSOLUTE: 0 K/CU MM
BASOPHILS RELATIVE PERCENT: 0.4 % (ref 0–1)
BILIRUB SERPL-MCNC: 0.2 MG/DL (ref 0–1)
BUN BLDV-MCNC: 10 MG/DL (ref 6–23)
CALCIUM SERPL-MCNC: 8 MG/DL (ref 8.3–10.6)
CHLORIDE BLD-SCNC: 104 MMOL/L (ref 99–110)
CO2: 22 MMOL/L (ref 21–32)
CREAT SERPL-MCNC: 1.1 MG/DL (ref 0.6–1.1)
DIFFERENTIAL TYPE: ABNORMAL
EOSINOPHILS ABSOLUTE: 0.3 K/CU MM
EOSINOPHILS RELATIVE PERCENT: 2.8 % (ref 0–3)
GFR AFRICAN AMERICAN: >60 ML/MIN/1.73M2
GFR NON-AFRICAN AMERICAN: 51 ML/MIN/1.73M2
GLUCOSE BLD-MCNC: 95 MG/DL (ref 70–99)
HCT VFR BLD CALC: 40.4 % (ref 37–47)
HEMOGLOBIN: 13 GM/DL (ref 12.5–16)
LYMPHOCYTES ABSOLUTE: 2.8 K/CU MM
LYMPHOCYTES RELATIVE PERCENT: 26.8 % (ref 24–44)
MCH RBC QN AUTO: 29.5 PG (ref 27–31)
MCHC RBC AUTO-ENTMCNC: 32.2 % (ref 32–36)
MCV RBC AUTO: 91.6 FL (ref 78–100)
MONOCYTES ABSOLUTE: 0.9 K/CU MM
MONOCYTES RELATIVE PERCENT: 8.9 % (ref 0–4)
PDW BLD-RTO: 13.8 % (ref 11.7–14.9)
PLATELET # BLD: 282 K/CU MM (ref 140–440)
PMV BLD AUTO: 9.3 FL (ref 7.5–11.1)
POTASSIUM SERPL-SCNC: 3.6 MMOL/L (ref 3.5–5.1)
RBC # BLD: 4.41 M/CU MM (ref 4.2–5.4)
SEGMENTED NEUTROPHILS ABSOLUTE COUNT: 6.3 K/CU MM
SEGMENTED NEUTROPHILS RELATIVE PERCENT: 61.1 % (ref 36–66)
SODIUM BLD-SCNC: 141 MMOL/L (ref 135–145)
T4 FREE: 0.96 NG/DL (ref 0.9–1.8)
TOTAL PROTEIN: 6.9 GM/DL (ref 6.4–8.2)
TSH HIGH SENSITIVITY: 2.88 UIU/ML (ref 0.27–4.2)
WBC # BLD: 10.3 K/CU MM (ref 4–10.5)

## 2021-01-29 PROCEDURE — 96413 CHEMO IV INFUSION 1 HR: CPT

## 2021-01-29 PROCEDURE — 6360000002 HC RX W HCPCS: Performed by: INTERNAL MEDICINE

## 2021-01-29 PROCEDURE — 80053 COMPREHEN METABOLIC PANEL: CPT

## 2021-01-29 PROCEDURE — 36591 DRAW BLOOD OFF VENOUS DEVICE: CPT

## 2021-01-29 PROCEDURE — 84439 ASSAY OF FREE THYROXINE: CPT

## 2021-01-29 PROCEDURE — 84443 ASSAY THYROID STIM HORMONE: CPT

## 2021-01-29 PROCEDURE — 2580000003 HC RX 258: Performed by: INTERNAL MEDICINE

## 2021-01-29 PROCEDURE — 99214 OFFICE O/P EST MOD 30 MIN: CPT | Performed by: INTERNAL MEDICINE

## 2021-01-29 PROCEDURE — 85025 COMPLETE CBC W/AUTO DIFF WBC: CPT

## 2021-01-29 RX ORDER — HEPARIN SODIUM (PORCINE) LOCK FLUSH IV SOLN 100 UNIT/ML 100 UNIT/ML
500 SOLUTION INTRAVENOUS PRN
Status: DISCONTINUED | OUTPATIENT
Start: 2021-01-29 | End: 2021-01-30 | Stop reason: HOSPADM

## 2021-01-29 RX ORDER — SODIUM CHLORIDE 9 MG/ML
20 INJECTION, SOLUTION INTRAVENOUS ONCE
Status: DISCONTINUED | OUTPATIENT
Start: 2021-01-29 | End: 2021-01-30 | Stop reason: HOSPADM

## 2021-01-29 RX ORDER — SODIUM CHLORIDE 0.9 % (FLUSH) 0.9 %
10 SYRINGE (ML) INJECTION PRN
Status: DISCONTINUED | OUTPATIENT
Start: 2021-01-29 | End: 2021-01-30 | Stop reason: HOSPADM

## 2021-01-29 RX ORDER — GABAPENTIN 300 MG/1
CAPSULE ORAL
COMMUNITY
Start: 2020-11-10

## 2021-01-29 RX ADMIN — SODIUM CHLORIDE, PRESERVATIVE FREE 10 ML: 5 INJECTION INTRAVENOUS at 13:10

## 2021-01-29 RX ADMIN — Medication 500 UNITS: at 13:10

## 2021-01-29 RX ADMIN — SODIUM CHLORIDE 200 MG: 9 INJECTION, SOLUTION INTRAVENOUS at 12:35

## 2021-01-29 ASSESSMENT — PATIENT HEALTH QUESTIONNAIRE - PHQ9: SUM OF ALL RESPONSES TO PHQ QUESTIONS 1-9: 1

## 2021-01-29 NOTE — PROGRESS NOTES
Patient Name: Praneeth Wynn  Patient : 1965  Patient MRN: [id number]     Primary Oncologist: Lora Horton MD  Referring Physician: Ariel Tee     Date of Service: 20       Chief Complaint:   Chief Complaint   Patient presents with    Follow-up        Active Problem list  1. Carcinoma of left lung (HCC)           HPI:   She is a very pleasant  lady [ 1965] with a 35-pack year history of smoking. She has a longstanding cough and shortness of breath related to her smoking; however, it got worse in late 2016 and she was treated as possible infectious episode, but did not improve., 2.  chest x-ray suggested pneumonia with a left upper lobe lesion 6.5 cm. CT chest done on 2016, showed a left upper lobe lung lesion measuring 6.5 by 5 cm, with extension to the first and second ribs posteriorly and bony erosion. She thus underwent CT-guided biopsy of the mass on 2016. Preliminary pathology found it to be a poorly differentiated adenocarcinoma. EGFR Negative, ALK & ROS-1 could not be done for lack of enough cells. CEA 2.2. ,  PET-CT, revealed the left upper lobe mass, which shows avid on PET-CT with extension into the left third rib, as well as to the spinous process of T3, however, felt to be resectable by Dr. Micha Roman. but he was not in her insurance group,  so she was referred to Dr. Humza Hall in [facility], who evaluated her on  and again on 2016. Additional workup done at [facility], included MRI of the chest, which showed a large Left upper lobe apical mass invading the left T3 transverse process and partially destroying the posterior left third rib. Mass abuts the undersurface of left second rib with invasion and completely fills the left T2-3 neural foramen. It did not invade or abut the undersurface of left brachial plexus, 5 mm away from that.   Dr. Humza Hall discussed the case with me after they had discussion at the Tumor Board and getting M.D. [place] peer review, neurosurgical opinion, Cy Reach, and it was felt that neoadjuvant chemotherapy versus chemoradiation could be considered and then surgical removal after that. The second option was to give only neoadjuvant chemotherapy and then surgery, which will be easier if the radiation was not given preop and consider postop radiation plus additional adjuvant chemotherapy, depending on the final pathology upon resection. She also had CT of the chest with contrast and CT of the thoracic spine without contrast on September 22, 2016, which confirmed the interval mild increase in the size of the large left upper lobe mass, within the medial left apex, extending into the posterior segment of left upper lobe, invading mediastinum, left posterior third rib, and left T3 transverse process, mass completely filling the T2-3 neural foramen, left hilar adenopathy, likely metastatic, and mildly enlarged subcarinal lymph node concerning for mets, a small nodule within the left upper lobe concerning for local mets. she underwent EBUS procedure with pollo biopsy, which turned out to be negative. MRI of the brain showed encephalomalacia in the left frontal temporal lobe, occlusion in the left internal carotid artery, and wallerian degeneration on the left. PET-CT August 18, 1110, showed metabolically active left upper lobe mass with local chest wall invasion and left posterior third rib and adjacent soft tissue, mildly asymmetrical activity in the left hilum, possibly could be reactive. No distant mets. Findings suggestive of left MCA distribution infarct. PFTs showed FEV1 of 2.06, DLCO/VA 82 percent.    Final stage IIIa [T4N0M0]   I agreed with the plan of neoadjuvant chemotherapy with carboplatinum and Alimta for three to four courses and depending upon the initial response after a couple of doses, we will do imaging again to see if the tumor is responding and if so, we will continue with four courses and follow it up with surgery, to be followed by adjuvant chemotherapy and radiation therapy. I preferred  not to use a taxane because of her neuropathy, for which she is currently on gabapentin, and previous stroke. After two courses of neoadjuvant chemotherapy with carbo/Alimta, she did get a CT of the chest December 7, 2016, which did show a decrease in the size of her tumor in the left upper lobe from 7.1 by 4.5 to 5.4 by 3.4 cm in size, indicating a partial response. However, she also showed evidence of some silent right lower lobe pulmonary emboli. These were not seen in the previous CT; however, that was done without contrast and not seen on the PET-CT, also, for the same reason. She is not very symptomatic and has no chest pain, shortness of breath, or significant cough. No leg or arm pain or swelling. Bilateral lower extremity ultrasounds, showed  No evidence of DVT in either lower extremity. CTA of her chest was done on January 17, 2017. No residual PE was found. The mass in the left suprahilar area now measured 8.7 by 4.3 compared to 6 by 5 cm before. Mild patchy opacity in the right lower lobe, possible atelectasis, also moderate emphysema. CT of the neck showed occluded left common carotid artery and internal carotid artery. Partially visualized left lung mass with invasion of the posterior chest wall. CT of the abdomen and pelvis showed no evidence of any metastasis in the abdomen or pelvis. There is a stable sclerotic lesion SI joint, maybe degenerative, and a questionable L3 sclerotic lesion. PET-CT February 16, 2017, showed progression of the large paramediastinal mass at the left apex 9.7 by 5 cm versus 6.7 by 4 previously, and mets into cervical lymph nodes, retroclavicular, and left axillary lymph nodes, right tonsillar uptake.   This indicated PD after 1st line Chemotherapy with Carbo/Alimta   She had a repeat biopsy of lung mass on February 13, 2017, and sent for [facility] testing which showed additional genomic alterations, including met amplification, which would make her eligible for crizotinib therapy, if she fails to pembro. She also could be a candidate for cabozantinib through a clinical trial.  PD-L1 antibodies came back positive 80% expression for pembrolizumab, which according to the NCCN guidelines, would even make her a first-line therapy for metastatic non-small cell carcinoma of the lung. EGFR was negative. ALK was also negative ROS1 also negative. Because of her high tumor mutation burden (high TMB), 32 mutations/MB, she has been a good candidate for immunotherapy with Nivolumab, pembrolizumab, or atezolizumab. Her PD-L1 expression was high, 80 percent through [facility] and 60 percent through [address] [place] [address]. [place]. She was thus started on Pembro[Keytruda]on March 6, 2017, tolerating well. She also Dr. Pato Rendon in February after her PET/CT and because she was felt to be unresectable at that point and was asymptomatic, he did not feel immediate radiation was indicated because of lack of symptoms and it was postponed to do that unless she became symptomatic. After 4 doses of Pembro, CAT scan of the chest done on May 12, 2017, showed no evidence of pulmonary embolism. Significant reduction in the size of malignant left apical mass compared with January of 2017. Increase in the amount of mediastinal and right hilar adenopathy since the prior study, possibly reactive and indeterminate in nature. There is a 6.6 mm right middle lobe pulmonary nodule, indeterminate. Mild right lower lobe atelectasis, bullous changes in the lungs. The left apical mass invading the mediastinum, as well as posterior chest wall. 5-18  CT CAP:  negative for disease   11-18 CT CAP negative  3-19   CT CAP negative   5-19  MRI brain negative   8-19  CT abdomen negative.     8-19  CT chest negative  1/17/20: CT chest, abdomen and pelvis neg for mets 7/20/2020 CT scan of the chest abdomen pelvis showed left lower lobe scarring otherwise no evidence of metastatic disease in the chest, abdomen or pelvis. She has not had any febrile from 6/12/2022 8/14/2020 due to cold pandemic. PAST MEDICAL HISTORY:  1. Hypertension, for which she takes no medication. 2. History of CVA in July of 2008, with right hemiparesis and speech involvement. After rehab, she is able to walk with the help of a cane. Speech is still impaired, with ongoing residual weakness. She is able to walk with a cane. 3. She is taking gabapentin for neuropathy. 4. Zoloft for depression and trazadone at night. 5. For high cholesterol she takes simvastatin. 6. History of irritable bowel syndrome. 7. Fibromyalgia., 8. platelet count had jumped up to more than a million in Nov 2016. Because of her previous history of stroke, I did check for JAK2 gene mutation to make sure she did not have underlying polycythemia or a myeloproliferative process in addition. JAK2 came back negative,  9. In Jan 2017,hemoglobin slowly dropped from 10 to 8.4 and a couple of days ago her hemoglobin was felt to be dropping to 6.3 from the hospital lab, with hematocrit dropping from 27 to 20,  10. Mammogram May 16, 2017, showed category 1, negative    PAST SURGICAL HISTORY:  1. Tubal ligation. 2. Appendectomy. 3. Tonsillectomy. 4. Carpal tunnel release. 5. D&C. FAMILY HISTORY:  Paternal grandmother and paternal aunt had breast cancer, maternal grandmother had uterine cancer. She has two sisters and one brother Deatra Perrinton. PERSONAL HISTORY:  She has a 35-pack year history of smoking, moderate to heavy drinking in the past, but quit 20 years ago. She has been  for 25 years. 3 children.  She has one daughter, Remberto Toth, and two sons Trey Almonte &.      Current Therapy  Pembrolizumab D1 Q21D (HNCC, NSCLC)X12 Cycle Length: 21 Number Cycles: 47 Start: Tian Cosme on 03/06/2017 Assoc Dx: Non-small cell lung cancer (disorder) LOT: 2nd Line Metastatic 2017 C10 D1  Pembrolizumab IV, 200 mg  OHC Hydration Cycle Length: 1 Number Cycles: 1 Start: Eliu Rojas on 2018 Assoc Dx: Dehydration LOT: Other 2018 C1 D1  Sodium Chloride IV 0.9 %, .9 % 1000 mL, Dose modified    Interval History  21: she arrived with her sister to the clinic today. Reports energy and appetite are intact. No weight loss or gain. Reported that she got caught in her pyjamas and fell on her last treatment day and hence could not come. .  Denied any fever or any cough. Denied any chest pain. No shortness of breath. No hemoptysis. Denied any palpitations or dizziness. Denied any dysphagia odynophagia. No new headache or bone pain. Denied any bleeding or any rash. Denied any abdominal pain, nausea, vomiting, diarrhea, constipation. No LE edema.       Review of Systems     Per interval history; otherwise 10 point ROS is negative    Vital Signs:  BP (!) 152/80 (Site: Right Upper Arm, Position: Sitting, Cuff Size: Medium Adult)   Pulse 95   Temp 97.6 °F (36.4 °C) (Infrared)   Ht 5' 2\" (1.575 m)   Wt 160 lb (72.6 kg)   SpO2 97%   BMI 29.26 kg/m²     Physical Exam:    CONSTITUTIONAL:aao x 3,walks with cane  EYES:No palor or any icterus  ENT: ATNC  NECK: No JVD  HEMATOLOGIC/LYMPHATIC: no cervical, supraclavicular or axillary lymphadenopathy   LUNGS: CTAB  CARDIOVASCULAR: s1s2 rrr no murmurs  ABDOMEN: soft nd nt bs pos  NEUROLOGIC: left hemiparesis  SKIN: No rash  EXTREMITIES: No LE edema bilaterally    Labs:    Hematology:  Lab Results   Component Value Date    WBC 9.3 2020    RBC 4.43 2020    HGB 13.0 2020    HCT 39.9 2020    MCV 90.1 2020    MCH 29.3 2020    MCHC 32.6 2020    RDW 14.4 2020     2020    MPV 9.3 2020    BANDSPCT 1 (L) 2017    SEGSPCT 56.6 2020    EOSRELPCT 2.4 2020    BASOPCT 0.3 2020    LYMPHOPCT 32.5 2020    MONOPCT 8.2 (H) 11/05/2020    BANDABS 0.05 01/16/2017    SEGSABS 5.3 11/05/2020    EOSABS 0.2 11/05/2020    BASOSABS 0.0 11/05/2020    LYMPHSABS 3.0 11/05/2020    MONOSABS 0.8 11/05/2020    DIFFTYPE AUTOMATED DIFFERENTIAL 11/05/2020    POLYCHROM 1+ 01/18/2017    PLTM PLATELETS APPEAR INCREASED 01/18/2017     No results found for: ESR    Chemistry:  Lab Results   Component Value Date     (L) 11/05/2020    K 3.9 11/05/2020    CL 98 (L) 11/05/2020    CO2 23 11/05/2020    BUN 14 11/05/2020    CREATININE 0.9 11/05/2020    GLUCOSE 97 11/05/2020    CALCIUM 8.5 11/05/2020    PROT 6.6 11/05/2020    LABALBU 4.2 11/05/2020    BILITOT 0.2 11/05/2020    ALKPHOS 131 (H) 11/05/2020    AST 18 11/05/2020    ALT 11 11/05/2020    LABGLOM >60 11/05/2020    GFRAA >60 11/05/2020    PHOS 3.0 02/17/2019    MG 2.2 02/18/2019     Lab Results   Component Value Date     08/13/2020     No components found for: LD  Lab Results   Component Value Date    TSHHS 2.190 11/05/2020    T4FREE 0.99 11/07/2019    FT3 2.4 06/20/2017       Immunology:  Lab Results   Component Value Date    PROT 6.6 11/05/2020     No results found for: Dorthey Spurling, KLFLCR  No results found for: B2M    Coagulation Panel:  Lab Results   Component Value Date    PROTIME 15.7 02/13/2017    INR 1.9 (H) 08/22/2017    APTT 32.7 02/13/2017    DDIMER 962 (H) 12/07/2016       Anemia Panel:  Lab Results   Component Value Date    TMTVBGQJ47 2632 (H) 01/18/2017    FOLATE >20.0 (H) 01/18/2017       Tumor Markers:  Lab Results   Component Value Date    CEA 1.7 04/12/2017         Imaging: Reviewed     Pathology:Reviewed     Observations:  Performance Status: ECOG 2  Depression Status: PHQ 9 Positive. Is on zoloft and follows with PCP. Deferred no further intervention        Assessment & Plan:  1.   Poorly differentiated adenocarcinoma of the lung [T4NxMx:  Diagnosed in June 2016.,  Initial treatment with neoadjuvant chemotherapy with carbo/Alimta with good partial response, but her weight gain which has  led her to stay in her room more  -Symptoms have been stable on 150 mg of Zoloft. Is being followed by primary care physician. Allergic sinusitis: Claritin and flonase    Continue other medical care    Discussed the above findings and plan with her and she seems to have verbalized understanding    Recommend excercise as tolerated, healthy diet and meal plan. Also discussed the importance of being uptodate with age appropriate screening tools including mammogram, pap smear    Recommend follow up with PCP and other specialists. Answered all question    [facility] April 23 2021 or earlier if new Sx. I have recommended that the patient follow CDC guidelines for prevention of COVID-19 infection.     [id number] Mensah

## 2021-01-29 NOTE — PROGRESS NOTES
1200: RN Assessment    Pt here for treatment, A&OX3. Pt saw Dr. Kev Soria earlier today. Pt with no new issues to report. Pt used a cane for ambulatory support. Unable to understand pt talking. Access    Right chest wall port, accessed with a 20 gauge 1inch Darby needle, + blood return, dressing to site    Treatment    [id number]: Keytruda infusion given without issues. + blood return, dressing to site.       Post tx, port flushed with NS and Heparin by covering tx room RN while I was off the unit

## 2021-01-29 NOTE — PROGRESS NOTES
MA Rooming Questions  Patient: Lisa Polanco  MRN: [id number]    Date: 1/29/2021        1. Do you have any new issues?   no         2. Do you need any refills on medications?    no    3. Have you had any imaging done since your last visit?   no    4. Have you been hospitalized or seen in the emergency room since your last visit here?   no    5. Did the patient have a depression screening completed today?  Yes    PHQ-9 Total Score: 1 (1/29/2021 12:00 PM)       PHQ-9 Given to (if applicable):               PHQ-9 Score (if applicable):                     [] Positive     []  Negative              Does question #9 need addressed (if applicable)                     [] Yes    []  No               Wilfredo Strange MA

## 2021-03-01 ENCOUNTER — TELEPHONE (OUTPATIENT)
Dept: INFUSION THERAPY | Age: 56
End: 2021-03-01

## 2021-03-10 RX ORDER — SODIUM CHLORIDE 0.9 % (FLUSH) 0.9 %
5 SYRINGE (ML) INJECTION PRN
Status: CANCELLED | OUTPATIENT
Start: 2021-03-11

## 2021-03-10 RX ORDER — EPINEPHRINE 1 MG/ML
0.3 INJECTION, SOLUTION, CONCENTRATE INTRAVENOUS PRN
Status: CANCELLED | OUTPATIENT
Start: 2021-03-11

## 2021-03-10 RX ORDER — METHYLPREDNISOLONE SODIUM SUCCINATE 125 MG/2ML
125 INJECTION, POWDER, LYOPHILIZED, FOR SOLUTION INTRAMUSCULAR; INTRAVENOUS ONCE
Status: CANCELLED | OUTPATIENT
Start: 2021-03-11 | End: 2021-03-11

## 2021-03-10 RX ORDER — SODIUM CHLORIDE 9 MG/ML
INJECTION, SOLUTION INTRAVENOUS CONTINUOUS
Status: CANCELLED | OUTPATIENT
Start: 2021-03-11

## 2021-03-10 RX ORDER — SODIUM CHLORIDE 0.9 % (FLUSH) 0.9 %
10 SYRINGE (ML) INJECTION PRN
Status: CANCELLED | OUTPATIENT
Start: 2021-03-11

## 2021-03-10 RX ORDER — SODIUM CHLORIDE 9 MG/ML
20 INJECTION, SOLUTION INTRAVENOUS ONCE
Status: CANCELLED | OUTPATIENT
Start: 2021-03-11 | End: 2021-03-11

## 2021-03-10 RX ORDER — MEPERIDINE HYDROCHLORIDE 25 MG/ML
12.5 INJECTION INTRAMUSCULAR; INTRAVENOUS; SUBCUTANEOUS ONCE
Status: CANCELLED | OUTPATIENT
Start: 2021-03-11 | End: 2021-03-11

## 2021-03-10 RX ORDER — DIPHENHYDRAMINE HYDROCHLORIDE 50 MG/ML
50 INJECTION INTRAMUSCULAR; INTRAVENOUS ONCE
Status: CANCELLED | OUTPATIENT
Start: 2021-03-11 | End: 2021-03-11

## 2021-03-10 RX ORDER — HEPARIN SODIUM (PORCINE) LOCK FLUSH IV SOLN 100 UNIT/ML 100 UNIT/ML
500 SOLUTION INTRAVENOUS PRN
Status: CANCELLED | OUTPATIENT
Start: 2021-03-11

## 2021-03-11 ENCOUNTER — TELEPHONE (OUTPATIENT)
Dept: INFUSION THERAPY | Age: 56
End: 2021-03-11

## 2021-03-11 NOTE — TELEPHONE ENCOUNTER
Pt's sister, Navid Gore LVM stating pt is running a temp & they are making an appt with her PCP today to have her tested for covid. Returned her call @ 10:29 to reschedule; LVM for her sister to return call to schedule.

## 2021-03-24 ENCOUNTER — TELEPHONE (OUTPATIENT)
Dept: ONCOLOGY | Age: 56
End: 2021-03-24

## 2021-03-24 DIAGNOSIS — C34.92 CARCINOMA OF LEFT LUNG (HCC): Primary | ICD-10-CM

## 2021-03-24 NOTE — TELEPHONE ENCOUNTER
Pt is scheduled for scan on 4/16 at [facility] at 10:00 with NPO 4 hours-- pt is aware and stated understanding

## 2021-03-26 ENCOUNTER — TELEPHONE (OUTPATIENT)
Dept: ONCOLOGY | Age: 56
End: 2021-03-26

## 2021-03-26 NOTE — TELEPHONE ENCOUNTER
Left message for patient regarding her CT on 04.16.2021 at [address] [place] [address] 1000. Left prep instructions.

## 2021-03-30 RX ORDER — SERTRALINE HYDROCHLORIDE 100 MG/1
TABLET, FILM COATED ORAL
Qty: 30 TABLET | Refills: 1 | Status: SHIPPED | OUTPATIENT
Start: 2021-03-30 | End: 2021-07-20

## 2021-04-16 ENCOUNTER — TELEPHONE (OUTPATIENT)
Dept: INFUSION THERAPY | Age: 56
End: 2021-04-16

## 2021-04-16 NOTE — TELEPHONE ENCOUNTER
Positive for trichomonas and chlamydia, already sent metronidazole for positive BV which will help treat  Trichomonas, will add azithromycin 1gm Pt's sister called to add tx on for 4/23 with her OV if possible; this works & she voiced understanding.

## 2021-04-22 RX ORDER — EPINEPHRINE 1 MG/ML
0.3 INJECTION, SOLUTION, CONCENTRATE INTRAVENOUS PRN
Status: CANCELLED | OUTPATIENT
Start: 2021-04-23

## 2021-04-22 RX ORDER — SODIUM CHLORIDE 0.9 % (FLUSH) 0.9 %
5 SYRINGE (ML) INJECTION PRN
Status: CANCELLED | OUTPATIENT
Start: 2021-04-23

## 2021-04-22 RX ORDER — MEPERIDINE HYDROCHLORIDE 25 MG/ML
12.5 INJECTION INTRAMUSCULAR; INTRAVENOUS; SUBCUTANEOUS ONCE
Status: CANCELLED | OUTPATIENT
Start: 2021-04-23 | End: 2021-04-23

## 2021-04-22 RX ORDER — DIPHENHYDRAMINE HYDROCHLORIDE 50 MG/ML
50 INJECTION INTRAMUSCULAR; INTRAVENOUS ONCE
Status: CANCELLED | OUTPATIENT
Start: 2021-04-23 | End: 2021-04-23

## 2021-04-22 RX ORDER — SODIUM CHLORIDE 9 MG/ML
INJECTION, SOLUTION INTRAVENOUS CONTINUOUS
Status: CANCELLED | OUTPATIENT
Start: 2021-04-23

## 2021-04-22 RX ORDER — METHYLPREDNISOLONE SODIUM SUCCINATE 125 MG/2ML
125 INJECTION, POWDER, LYOPHILIZED, FOR SOLUTION INTRAMUSCULAR; INTRAVENOUS ONCE
Status: CANCELLED | OUTPATIENT
Start: 2021-04-23 | End: 2021-04-23

## 2021-04-23 ENCOUNTER — OFFICE VISIT (OUTPATIENT)
Dept: ONCOLOGY | Age: 56
End: 2021-04-23
Payer: MEDICARE

## 2021-04-23 ENCOUNTER — HOSPITAL ENCOUNTER (OUTPATIENT)
Dept: INFUSION THERAPY | Age: 56
Discharge: HOME | End: 2021-04-23
Payer: MEDICARE

## 2021-04-23 VITALS
DIASTOLIC BLOOD PRESSURE: 77 MMHG | WEIGHT: 166.4 LBS | RESPIRATION RATE: 16 BRPM | HEIGHT: 65 IN | BODY MASS INDEX: 27.72 KG/M2 | OXYGEN SATURATION: 97 % | TEMPERATURE: 97.9 F | SYSTOLIC BLOOD PRESSURE: 144 MMHG | HEART RATE: 86 BPM

## 2021-04-23 DIAGNOSIS — C34.92 CARCINOMA OF LEFT LUNG (HCC): Primary | ICD-10-CM

## 2021-04-23 PROBLEM — C79.52 SECONDARY MALIGNANT NEOPLASM OF BONE AND BONE MARROW (HCC): Status: RESOLVED | Noted: 2020-06-23 | Resolved: 2021-04-23

## 2021-04-23 PROBLEM — C79.51 SECONDARY MALIGNANT NEOPLASM OF BONE AND BONE MARROW (HCC): Status: RESOLVED | Noted: 2020-06-23 | Resolved: 2021-04-23

## 2021-04-23 LAB
ALBUMIN SERPL-MCNC: 4.1 GM/DL (ref 3.4–5)
ALP BLD-CCNC: 103 IU/L (ref 40–128)
ALT SERPL-CCNC: 38 U/L (ref 10–40)
ANION GAP SERPL CALCULATED.3IONS-SCNC: 13 MMOL/L (ref 4–16)
AST SERPL-CCNC: 27 IU/L (ref 15–37)
BASOPHILS ABSOLUTE: 0.1 K/CU MM
BASOPHILS RELATIVE PERCENT: 0.6 % (ref 0–1)
BILIRUB SERPL-MCNC: 0.2 MG/DL (ref 0–1)
BUN BLDV-MCNC: 12 MG/DL (ref 6–23)
CALCIUM SERPL-MCNC: 7.9 MG/DL (ref 8.3–10.6)
CHLORIDE BLD-SCNC: 103 MMOL/L (ref 99–110)
CO2: 24 MMOL/L (ref 21–32)
CREAT SERPL-MCNC: 1.1 MG/DL (ref 0.6–1.1)
DIFFERENTIAL TYPE: ABNORMAL
EOSINOPHILS ABSOLUTE: 0.2 K/CU MM
EOSINOPHILS RELATIVE PERCENT: 2.1 % (ref 0–3)
GFR AFRICAN AMERICAN: >60 ML/MIN/1.73M2
GFR NON-AFRICAN AMERICAN: 51 ML/MIN/1.73M2
GLUCOSE BLD-MCNC: 81 MG/DL (ref 70–99)
HCT VFR BLD CALC: 37.2 % (ref 37–47)
HEMOGLOBIN: 11.9 GM/DL (ref 12.5–16)
LYMPHOCYTES ABSOLUTE: 2.9 K/CU MM
LYMPHOCYTES RELATIVE PERCENT: 30.6 % (ref 24–44)
MCH RBC QN AUTO: 29.6 PG (ref 27–31)
MCHC RBC AUTO-ENTMCNC: 32 % (ref 32–36)
MCV RBC AUTO: 92.5 FL (ref 78–100)
MONOCYTES ABSOLUTE: 0.7 K/CU MM
MONOCYTES RELATIVE PERCENT: 7 % (ref 0–4)
PDW BLD-RTO: 13.3 % (ref 11.7–14.9)
PLATELET # BLD: 326 K/CU MM (ref 140–440)
PMV BLD AUTO: 8.9 FL (ref 7.5–11.1)
POTASSIUM SERPL-SCNC: 4.1 MMOL/L (ref 3.5–5.1)
RBC # BLD: 4.02 M/CU MM (ref 4.2–5.4)
SEGMENTED NEUTROPHILS ABSOLUTE COUNT: 5.7 K/CU MM
SEGMENTED NEUTROPHILS RELATIVE PERCENT: 59.7 % (ref 36–66)
SODIUM BLD-SCNC: 140 MMOL/L (ref 135–145)
TOTAL PROTEIN: 6.7 GM/DL (ref 6.4–8.2)
TSH HIGH SENSITIVITY: 3.07 UIU/ML (ref 0.27–4.2)
WBC # BLD: 9.6 K/CU MM (ref 4–10.5)

## 2021-04-23 PROCEDURE — 96413 CHEMO IV INFUSION 1 HR: CPT

## 2021-04-23 PROCEDURE — 99214 OFFICE O/P EST MOD 30 MIN: CPT | Performed by: INTERNAL MEDICINE

## 2021-04-23 PROCEDURE — 84443 ASSAY THYROID STIM HORMONE: CPT

## 2021-04-23 PROCEDURE — 80053 COMPREHEN METABOLIC PANEL: CPT

## 2021-04-23 PROCEDURE — 2580000003 HC RX 258: Performed by: INTERNAL MEDICINE

## 2021-04-23 PROCEDURE — 6360000002 HC RX W HCPCS: Performed by: INTERNAL MEDICINE

## 2021-04-23 PROCEDURE — 85025 COMPLETE CBC W/AUTO DIFF WBC: CPT

## 2021-04-23 PROCEDURE — 36591 DRAW BLOOD OFF VENOUS DEVICE: CPT

## 2021-04-23 RX ORDER — SODIUM CHLORIDE 0.9 % (FLUSH) 0.9 %
10 SYRINGE (ML) INJECTION PRN
Status: DISCONTINUED | OUTPATIENT
Start: 2021-04-23 | End: 2021-04-24 | Stop reason: HOSPADM

## 2021-04-23 RX ORDER — HEPARIN SODIUM (PORCINE) LOCK FLUSH IV SOLN 100 UNIT/ML 100 UNIT/ML
500 SOLUTION INTRAVENOUS PRN
Status: DISCONTINUED | OUTPATIENT
Start: 2021-04-23 | End: 2021-04-24 | Stop reason: HOSPADM

## 2021-04-23 RX ORDER — SODIUM CHLORIDE 9 MG/ML
20 INJECTION, SOLUTION INTRAVENOUS ONCE
Status: DISCONTINUED | OUTPATIENT
Start: 2021-04-23 | End: 2021-04-24 | Stop reason: HOSPADM

## 2021-04-23 RX ADMIN — SODIUM CHLORIDE, PRESERVATIVE FREE 10 ML: 5 INJECTION INTRAVENOUS at 15:20

## 2021-04-23 RX ADMIN — HEPARIN 500 UNITS: 100 SYRINGE at 15:20

## 2021-04-23 RX ADMIN — SODIUM CHLORIDE 20 ML/HR: 9 INJECTION, SOLUTION INTRAVENOUS at 14:37

## 2021-04-23 RX ADMIN — SODIUM CHLORIDE 200 MG: 9 INJECTION, SOLUTION INTRAVENOUS at 14:36

## 2021-04-23 NOTE — PROGRESS NOTES
1430: RN Assessment    Pt here for treatment, A&OX3. No new issues to report. Pt saw the provider today before coming back to the tx suite. Right chest wall port, accessed with a 20 gauge 1inch Darby needle, + blood return, dressing to site. Labs drawn as ordered. Treatment    1436: Keytruda 200 mg given without issues. + blood return pre/post infusion. Port then flushed with NS and heparin and removed,dressing to site. Pt given AVS report before leaving. Julian Riley, [address] Saunders accompanied her to [facility] and will make sure she stops at the  desk to make her return provider appt. Patient's status assessed and documented appropriately. All labs and required results were also reviewed today. Treatment parameters have been reviewed. Today's treatment has been approved by the provider. Treatment orders and medication sequencing (when applicable) was verified by 2 registered nurses. The treatment plan was confirmed with the patient prior to administration, and the patient understands the need to report any treatment-related symptoms. Prior to administration, when applicable, the following 8 elements of medication administration were reviewed with 2nd Registered Nurse prior to dosing: drug name, drug dose, infusion volume when prepared in a syringe, rate of administration, expiration dates and/or times, appearance and integrity of drug(s), and rate of pump for infusion. The 5 rights of medication administration have been verified.

## 2021-04-23 NOTE — PROGRESS NOTES
Patient Name: Tennis Curling  Patient : 1965  Patient MRN: [id number]     Primary Oncologist: Oriana Sheridan MD  Referring Physician: Chet Schroeder     Date of Service:21       Chief Complaint:   Chief Complaint   Patient presents with    Discuss Labs        Active Problem list  1. Carcinoma of left lung (HCC)           HPI:   She is a very pleasant  lady [ 1965] with a 35-pack year history of smoking. She has a longstanding cough and shortness of breath related to her smoking; however, it got worse in late 2016 and she was treated as possible infectious episode, but did not improve., 2.  chest x-ray suggested pneumonia with a left upper lobe lesion 6.5 cm. CT chest done on 2016, showed a left upper lobe lung lesion measuring 6.5 by 5 cm, with extension to the first and second ribs posteriorly and bony erosion. She thus underwent CT-guided biopsy of the mass on 2016. Preliminary pathology found it to be a poorly differentiated adenocarcinoma. EGFR Negative, ALK & ROS-1 could not be done for lack of enough cells. CEA 2.2. ,  PET-CT, revealed the left upper lobe mass, which shows avid on PET-CT with extension into the left third rib, as well as to the spinous process of T3, however, felt to be resectable by Dr. Jacy Mcrae. but he was not in her insurance group,  so she was referred to Dr. Nola Barrientos in [facility], who evaluated her on  and again on 2016. Additional workup done at [facility], included MRI of the chest, which showed a large Left upper lobe apical mass invading the left T3 transverse process and partially destroying the posterior left third rib. Mass abuts the undersurface of left second rib with invasion and completely fills the left T2-3 neural foramen. It did not invade or abut the undersurface of left brachial plexus, 5 mm away from that.   Dr. Nola Barrientos discussed the case with me after they had discussion at the Tumor Board and [facility] [place] peer review, neurosurgical opinion, Robert Posada, and it was felt that neoadjuvant chemotherapy versus chemoradiation could be considered and then surgical removal after that. The second option was to give only neoadjuvant chemotherapy and then surgery, which will be easier if the radiation was not given preop and consider postop radiation plus additional adjuvant chemotherapy, depending on the final pathology upon resection. She also had CT of the chest with contrast and CT of the thoracic spine without contrast on September 22, 2016, which confirmed the interval mild increase in the size of the large left upper lobe mass, within the medial left apex, extending into the posterior segment of left upper lobe, invading mediastinum, left posterior third rib, and left T3 transverse process, mass completely filling the T2-3 neural foramen, left hilar adenopathy, likely metastatic, and mildly enlarged subcarinal lymph node concerning for mets, a small nodule within the left upper lobe concerning for local mets. she underwent EBUS procedure with pollo biopsy, which turned out to be negative. MRI of the brain showed encephalomalacia in the left frontal temporal lobe, occlusion in the left internal carotid artery, and wallerian degeneration on the left. PET-CT August 18, 0096, showed metabolically active left upper lobe mass with local chest wall invasion and left posterior third rib and adjacent soft tissue, mildly asymmetrical activity in the left hilum, possibly could be reactive. No distant mets. Findings suggestive of left MCA distribution infarct. PFTs showed FEV1 of 2.06, DLCO/VA 82 percent.    Final stage IIIa [T4N0M0]   I agreed with the plan of neoadjuvant chemotherapy with carboplatinum and Alimta for three to four courses and depending upon the initial response after a couple of doses, we will do imaging again to see if the tumor is responding and if so, we will continue with four courses and follow it up with surgery, to be followed by adjuvant chemotherapy and radiation therapy. I preferred  not to use a taxane because of her neuropathy, for which she is currently on gabapentin, and previous stroke. After two courses of neoadjuvant chemotherapy with carbo/Alimta, she did get a CT of the chest December 7, 2016, which did show a decrease in the size of her tumor in the left upper lobe from 7.1 by 4.5 to 5.4 by 3.4 cm in size, indicating a partial response. However, she also showed evidence of some silent right lower lobe pulmonary emboli. These were not seen in the previous CT; however, that was done without contrast and not seen on the PET-CT, also, for the same reason. She is not very symptomatic and has no chest pain, shortness of breath, or significant cough. No leg or arm pain or swelling. Bilateral lower extremity ultrasounds, showed  No evidence of DVT in either lower extremity. CTA of her chest was done on January 17, 2017. No residual PE was found. The mass in the left suprahilar area now measured 8.7 by 4.3 compared to 6 by 5 cm before. Mild patchy opacity in the right lower lobe, possible atelectasis, also moderate emphysema. CT of the neck showed occluded left common carotid artery and internal carotid artery. Partially visualized left lung mass with invasion of the posterior chest wall. CT of the abdomen and pelvis showed no evidence of any metastasis in the abdomen or pelvis. There is a stable sclerotic lesion SI joint, maybe degenerative, and a questionable L3 sclerotic lesion. PET-CT February 16, 2017, showed progression of the large paramediastinal mass at the left apex 9.7 by 5 cm versus 6.7 by 4 previously, and mets into cervical lymph nodes, retroclavicular, and left axillary lymph nodes, right tonsillar uptake.   This indicated PD after 1st line Chemotherapy with Carbo/Alimta   She had a repeat biopsy of lung mass on February 13, 2017, and sent for [facility] testing which showed additional genomic alterations, including met amplification, which would make her eligible for crizotinib therapy, if she fails to pembro. She also could be a candidate for cabozantinib through a clinical trial.  PD-L1 antibodies came back positive 80% expression for pembrolizumab, which according to the NCCN guidelines, would even make her a first-line therapy for metastatic non-small cell carcinoma of the lung. EGFR was negative. ALK was also negative ROS1 also negative. Because of her high tumor mutation burden (high TMB), 32 mutations/MB, she has been a good candidate for immunotherapy with Nivolumab, pembrolizumab, or atezolizumab. Her PD-L1 expression was high, 80 percent through [facility] and 60 percent through [address] [place] [address]. [place]. She was thus started on Pembro[Keytruda]on March 6, 2017, tolerating well. She also Dr. Lobo Sanford in February after her PET/CT and because she was felt to be unresectable at that point and was asymptomatic, he did not feel immediate radiation was indicated because of lack of symptoms and it was postponed to do that unless she became symptomatic. After 4 doses of Pembro, CAT scan of the chest done on May 12, 2017, showed no evidence of pulmonary embolism. Significant reduction in the size of malignant left apical mass compared with January of 2017. Increase in the amount of mediastinal and right hilar adenopathy since the prior study, possibly reactive and indeterminate in nature. There is a 6.6 mm right middle lobe pulmonary nodule, indeterminate. Mild right lower lobe atelectasis, bullous changes in the lungs. The left apical mass invading the mediastinum, as well as posterior chest wall. 5-18  CT CAP:  negative for disease   11-18 CT CAP negative  3-19   CT CAP negative   5-19  MRI brain negative   8-19  CT abdomen negative.     8-19  CT chest negative  1/17/20: CT chest, abdomen and pelvis neg for mets 7/20/2020 CT scan of the chest abdomen pelvis showed left lower lobe scarring otherwise no evidence of metastatic disease in the chest, abdomen or pelvis. PAST MEDICAL HISTORY:  1. Hypertension, for which she takes no medication. 2. History of CVA in July of 2008, with right hemiparesis and speech involvement. After rehab, she is able to walk with the help of a cane. Speech is still impaired, with ongoing residual weakness. She is able to walk with a cane. 3. She is taking gabapentin for neuropathy. 4. Zoloft for depression and trazadone at night. 5. For high cholesterol she takes simvastatin. 6. History of irritable bowel syndrome. 7. Fibromyalgia., 8. platelet count had jumped up to more than a million in Nov 2016. Because of her previous history of stroke, I did check for JAK2 gene mutation to make sure she did not have underlying polycythemia or a myeloproliferative process in addition. JAK2 came back negative,  9. In Jan 2017,hemoglobin slowly dropped from 10 to 8.4 and a couple of days ago her hemoglobin was felt to be dropping to 6.3 from the hospital lab, with hematocrit dropping from 27 to 20,  10. Mammogram May 16, 2017, showed category 1, negative    PAST SURGICAL HISTORY:  1. Tubal ligation. 2. Appendectomy. 3. Tonsillectomy. 4. Carpal tunnel release. 5. D&C. FAMILY HISTORY:  Paternal grandmother and paternal aunt had breast cancer, maternal grandmother had uterine cancer. She has two sisters and one brother Trish Seip. PERSONAL HISTORY:  She has a 35-pack year history of smoking, moderate to heavy drinking in the past, but quit 20 years ago. She has been  for 25 years. 3 children.  She has one daughter, Loco Brewer, and two sons Ashwin Baldwin &.      Current Therapy  Pembrolizumab D1 Q21D (HNCC, NSCLC)X12 Cycle Length: 21 Number Cycles: 47 Start: Nicky Pierce on 03/06/2017 Assoc Dx: Non-small cell lung cancer (disorder) LOT: 2nd Line Metastatic 03/06/2017 C10 D1  Pembrolizumab IV, 200 mg  OHC Hydration Cycle Length: 1 Number Cycles: 1 Start: C1D1 on 2018 Assoc Dx: Dehydration LOT: Other 2018 C1 D1  Sodium Chloride IV 0.9 %, .9 % 1000 mL, Dose modified    Interval History  21: she arrived with her brother to the clinic today. Reported that she might have twisted her left ankle and was c/o pain in the left foot dorsal area, but no swelling. No erythema. Has few scratch marks on left elbow, cat. Reports energy and appetite are intact. No weight loss or gain. any fever or any cough. Denied any chest pain. No shortness of breath. No hemoptysis. Denied any palpitations or dizziness. Denied any dysphagia odynophagia. No new headache or bone pain. Denied any bleeding or any rash. Denied any abdominal pain, nausea, vomiting, diarrhea, constipation. No LE edema. Reported that she tripped and fell 2 weeks ago and hit her head.       Review of Systems  Per interval history; otherwise 10 point ROS is negative      Vital Signs:  BP (!) 144/77 (Site: Right Upper Arm, Position: Sitting, Cuff Size: Large Adult)   Pulse 86   Temp 97.9 °F (36.6 °C) (Infrared)   Resp 16   Ht 5' 5\" (1.651 m)   Wt 166 lb 6.4 oz (75.5 kg)   SpO2 97%   BMI 27.69 kg/m²     Physical Exam:    CONSTITUTIONAL:aao x 3,walks with cane, aphasia  EYES:No palor or any icterus  ENT: ATNC  NECK: No JVD  HEMATOLOGIC/LYMPHATIC: no cervical, supraclavicular or axillary lymphadenopathy   LUNGS: CTAB  CARDIOVASCULAR: s1s2 rrr no murmurs  ABDOMEN: soft nd nt bs pos  NEUROLOGIC: left hemiparesis  SKIN: Small scab on the left Wrist area with mild surrounding erythema  EXTREMITIES: ttp left foot, but no swelling    Labs:    Hematology:  Lab Results   Component Value Date    WBC 10.3 2021    RBC 4.41 2021    HGB 13.0 2021    HCT 40.4 2021    MCV 91.6 2021    MCH 29.5 2021    MCHC 32.2 2021    RDW 13.8 2021     2021    MPV 9.3 2021    BANDSPCT 1 (L) 01/16/2017    SEGSPCT 61.1 01/29/2021    EOSRELPCT 2.8 01/29/2021    BASOPCT 0.4 01/29/2021    LYMPHOPCT 26.8 01/29/2021    MONOPCT 8.9 (H) 01/29/2021    BANDABS 0.05 01/16/2017    SEGSABS 6.3 01/29/2021    EOSABS 0.3 01/29/2021    BASOSABS 0.0 01/29/2021    LYMPHSABS 2.8 01/29/2021    MONOSABS 0.9 01/29/2021    DIFFTYPE AUTOMATED DIFFERENTIAL 01/29/2021    POLYCHROM 1+ 01/18/2017    PLTM PLATELETS APPEAR INCREASED 01/18/2017     No results found for: ESR    Chemistry:  Lab Results   Component Value Date     01/29/2021    K 3.6 01/29/2021     01/29/2021    CO2 22 01/29/2021    BUN 10 01/29/2021    CREATININE 1.1 01/29/2021    GLUCOSE 95 01/29/2021    CALCIUM 8.0 (L) 01/29/2021    PROT 6.9 01/29/2021    LABALBU 4.2 01/29/2021    BILITOT 0.2 01/29/2021    ALKPHOS 112 01/29/2021    AST 17 01/29/2021    ALT 16 01/29/2021    LABGLOM 51 (L) 01/29/2021    GFRAA >60 01/29/2021    PHOS 3.0 02/17/2019    MG 2.2 02/18/2019     Lab Results   Component Value Date     08/13/2020     No components found for: LD  Lab Results   Component Value Date    TSHHS 2.880 01/29/2021    T4FREE 0.96 01/29/2021    FT3 2.4 06/20/2017       Immunology:  Lab Results   Component Value Date    PROT 6.9 01/29/2021     No results found for: GALO Babin  No results found for: B2M    Coagulation Panel:  Lab Results   Component Value Date    PROTIME 15.7 02/13/2017    INR 1.9 (H) 08/22/2017    APTT 32.7 02/13/2017    DDIMER 962 (H) 12/07/2016       Anemia Panel:  Lab Results   Component Value Date    ACHTGUQD94 4327 (H) 01/18/2017    FOLATE >20.0 (H) 01/18/2017       Tumor Markers:  Lab Results   Component Value Date    CEA 1.7 04/12/2017         Imaging: Reviewed     Pathology:Reviewed     Observations:  Performance Status: ECOG 2  Depression Status: PHQ 9 Positive. Is on zoloft and follows with PCP. Deferred no further intervention        Assessment & Plan:  1.   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 A repeat biopsy and [facility] results showed complex karyotype, as above, with multiple mutations plus high PD-L1 expression. Second-line treatment with immunotherapy, pembrolizumab started in March 2017. She seems to be responding very well to that with good initial response. Based on the PET-CT results, it looks like she has shown an excellent response to her pembrolizumab therapy  She has shown near complete remission with the left apical mass decreasing from 9.5 to 4 cm, but SUV dropped from 29 to 3.5. No other evidence of disease elsewhere. We discussed her case in the Tumor Board, also, and it seems that she is not a candidate for surgery as determined in February of 2017. Her PD-L1 expression was 80 percent  She continues to be on pembrolizumab with the continued excellent treatment response. Plan to continue pembrolizumab until adverse effects of progressive disease. We will continue to repeat CT scan of the chest every 6 to 12 months. Last scan 7/2020 as above with no recurrent disease  -no immune mediated or grade three toxicities  - Repeat CT chest done April 2021and plan to continue current treatment if no evidence of progression per imaging.  -Plan to repeat Ct chest, abdomen and pelvis in 6M in October 2021     Left foot pain:[facility]. Will follow up on results. Pulmonary emboli:  She was found to have silent PE on initial evaluation on CAT scan and thus had been on Coumadin for more than a year. she had some bloody stools and bruising which resolved when Coumadin was discontinued    Diarrhea  -etiology?   resolved when took break from [facility]  -c scope 6-18 negative  -resolved; no issues with constipation either      depression  -worsened due to situational issues(granddaughter molested)  perpertrator incarcerated and Zoloft was increased to 150  -since dosage increase patient has been staying in her room more, but more agitated/upset  -not sure if this is more psych related vs  did the increase in dose of her zoloft lead to her weight gain which has  led her to stay in her room more  -Symptoms have been stable on 150 mg of Zoloft. Is being followed by primary care physician. Continue other medical care    Discussed the above findings and plan with her and she seems to have verbalized understanding    Recommend excercise as tolerated, healthy diet and meal plan. Also discussed the importance of being uptodate with age appropriate screening tools including mammogram, pap smear    Recommend follow up with PCP and other specialists. Answered all question    RTC July 2021  or earlier if new Sx. I have recommended that the patient follow CDC guidelines for prevention of COVID-19 infection.     [id number]

## 2021-04-23 NOTE — PROGRESS NOTES
MA Rooming Questions  Patient: Gilford Langton  MRN: [id number]    Date: 4/23/2021        1. Do you have any new issues?   no         2. Do you need any refills on medications?    no    3. Have you had any imaging done since your last visit?   no      4. Have you been hospitalized or seen in the emergency room since your last visit here?   yes - Rebekah Singh    5. Did the patient have a depression screening completed today?  Yes    No data recorded     PHQ-9 Given to (if applicable):               PHQ-9 Score (if applicable):                     [] Positive     []  Negative              Does question #9 need addressed (if applicable)                     [] Yes    []  No               Jluiann Coronel CMA

## 2021-05-12 DIAGNOSIS — C34.92 CARCINOMA OF LEFT LUNG (HCC): Primary | ICD-10-CM

## 2021-05-12 DIAGNOSIS — E03.9 HYPOTHYROIDISM, UNSPECIFIED TYPE: ICD-10-CM

## 2021-05-14 ENCOUNTER — HOSPITAL ENCOUNTER (OUTPATIENT)
Dept: INFUSION THERAPY | Age: 56
Discharge: HOME | End: 2021-05-14
Payer: MEDICARE

## 2021-05-14 VITALS
OXYGEN SATURATION: 95 % | TEMPERATURE: 97.1 F | DIASTOLIC BLOOD PRESSURE: 95 MMHG | WEIGHT: 162 LBS | HEART RATE: 93 BPM | HEIGHT: 65 IN | SYSTOLIC BLOOD PRESSURE: 147 MMHG | BODY MASS INDEX: 26.99 KG/M2 | RESPIRATION RATE: 16 BRPM

## 2021-05-14 DIAGNOSIS — C34.92 CARCINOMA OF LEFT LUNG (HCC): Primary | ICD-10-CM

## 2021-05-14 DIAGNOSIS — E03.9 HYPOTHYROIDISM, UNSPECIFIED TYPE: ICD-10-CM

## 2021-05-14 LAB
ALBUMIN SERPL-MCNC: 4.4 GM/DL (ref 3.4–5)
ALP BLD-CCNC: 117 IU/L (ref 40–128)
ALT SERPL-CCNC: 14 U/L (ref 10–40)
ANION GAP SERPL CALCULATED.3IONS-SCNC: 12 MMOL/L (ref 4–16)
AST SERPL-CCNC: 19 IU/L (ref 15–37)
BASOPHILS ABSOLUTE: 0 K/CU MM
BASOPHILS RELATIVE PERCENT: 0.4 % (ref 0–1)
BILIRUB SERPL-MCNC: 0.2 MG/DL (ref 0–1)
BUN BLDV-MCNC: 13 MG/DL (ref 6–23)
CALCIUM SERPL-MCNC: 7.6 MG/DL (ref 8.3–10.6)
CHLORIDE BLD-SCNC: 104 MMOL/L (ref 99–110)
CO2: 24 MMOL/L (ref 21–32)
CREAT SERPL-MCNC: 1 MG/DL (ref 0.6–1.1)
DIFFERENTIAL TYPE: ABNORMAL
EOSINOPHILS ABSOLUTE: 0.2 K/CU MM
EOSINOPHILS RELATIVE PERCENT: 2.4 % (ref 0–3)
GFR AFRICAN AMERICAN: >60 ML/MIN/1.73M2
GFR NON-AFRICAN AMERICAN: 57 ML/MIN/1.73M2
GLUCOSE BLD-MCNC: 99 MG/DL (ref 70–99)
HCT VFR BLD CALC: 40 % (ref 37–47)
HEMOGLOBIN: 12.9 GM/DL (ref 12.5–16)
LYMPHOCYTES ABSOLUTE: 2.6 K/CU MM
LYMPHOCYTES RELATIVE PERCENT: 30.5 % (ref 24–44)
MCH RBC QN AUTO: 29.7 PG (ref 27–31)
MCHC RBC AUTO-ENTMCNC: 32.3 % (ref 32–36)
MCV RBC AUTO: 92 FL (ref 78–100)
MONOCYTES ABSOLUTE: 0.6 K/CU MM
MONOCYTES RELATIVE PERCENT: 7.5 % (ref 0–4)
PDW BLD-RTO: 13.8 % (ref 11.7–14.9)
PLATELET # BLD: 319 K/CU MM (ref 140–440)
PMV BLD AUTO: 9 FL (ref 7.5–11.1)
POTASSIUM SERPL-SCNC: 4 MMOL/L (ref 3.5–5.1)
RBC # BLD: 4.35 M/CU MM (ref 4.2–5.4)
SEGMENTED NEUTROPHILS ABSOLUTE COUNT: 5 K/CU MM
SEGMENTED NEUTROPHILS RELATIVE PERCENT: 59.2 % (ref 36–66)
SODIUM BLD-SCNC: 140 MMOL/L (ref 135–145)
T4 FREE: 0.88 NG/DL (ref 0.9–1.8)
TOTAL PROTEIN: 7 GM/DL (ref 6.4–8.2)
TSH HIGH SENSITIVITY: 2.22 UIU/ML (ref 0.27–4.2)
WBC # BLD: 8.5 K/CU MM (ref 4–10.5)

## 2021-05-14 PROCEDURE — 96413 CHEMO IV INFUSION 1 HR: CPT

## 2021-05-14 PROCEDURE — 6360000002 HC RX W HCPCS: Performed by: INTERNAL MEDICINE

## 2021-05-14 PROCEDURE — 80053 COMPREHEN METABOLIC PANEL: CPT

## 2021-05-14 PROCEDURE — 84439 ASSAY OF FREE THYROXINE: CPT

## 2021-05-14 PROCEDURE — 85025 COMPLETE CBC W/AUTO DIFF WBC: CPT

## 2021-05-14 PROCEDURE — 2580000003 HC RX 258: Performed by: INTERNAL MEDICINE

## 2021-05-14 PROCEDURE — 84443 ASSAY THYROID STIM HORMONE: CPT

## 2021-05-14 PROCEDURE — 36591 DRAW BLOOD OFF VENOUS DEVICE: CPT

## 2021-05-14 RX ORDER — EPINEPHRINE 1 MG/ML
0.3 INJECTION, SOLUTION, CONCENTRATE INTRAVENOUS PRN
Status: CANCELLED | OUTPATIENT
Start: 2021-05-14

## 2021-05-14 RX ORDER — MEPERIDINE HYDROCHLORIDE 25 MG/ML
12.5 INJECTION INTRAMUSCULAR; INTRAVENOUS; SUBCUTANEOUS ONCE
Status: CANCELLED | OUTPATIENT
Start: 2021-05-14 | End: 2021-05-14

## 2021-05-14 RX ORDER — SODIUM CHLORIDE 0.9 % (FLUSH) 0.9 %
10 SYRINGE (ML) INJECTION PRN
Status: CANCELLED | OUTPATIENT
Start: 2021-05-14

## 2021-05-14 RX ORDER — HEPARIN SODIUM (PORCINE) LOCK FLUSH IV SOLN 100 UNIT/ML 100 UNIT/ML
500 SOLUTION INTRAVENOUS PRN
Status: CANCELLED | OUTPATIENT
Start: 2021-05-14

## 2021-05-14 RX ORDER — SODIUM CHLORIDE 0.9 % (FLUSH) 0.9 %
10 SYRINGE (ML) INJECTION PRN
Status: DISCONTINUED | OUTPATIENT
Start: 2021-05-14 | End: 2021-05-15 | Stop reason: HOSPADM

## 2021-05-14 RX ORDER — METHYLPREDNISOLONE SODIUM SUCCINATE 125 MG/2ML
125 INJECTION, POWDER, LYOPHILIZED, FOR SOLUTION INTRAMUSCULAR; INTRAVENOUS ONCE
Status: CANCELLED | OUTPATIENT
Start: 2021-05-14 | End: 2021-05-14

## 2021-05-14 RX ORDER — SODIUM CHLORIDE 9 MG/ML
20 INJECTION, SOLUTION INTRAVENOUS ONCE
Status: CANCELLED | OUTPATIENT
Start: 2021-05-14 | End: 2021-05-14

## 2021-05-14 RX ORDER — SODIUM CHLORIDE 0.9 % (FLUSH) 0.9 %
5 SYRINGE (ML) INJECTION PRN
Status: CANCELLED | OUTPATIENT
Start: 2021-05-14

## 2021-05-14 RX ORDER — DIPHENHYDRAMINE HYDROCHLORIDE 50 MG/ML
50 INJECTION INTRAMUSCULAR; INTRAVENOUS ONCE
Status: CANCELLED | OUTPATIENT
Start: 2021-05-14 | End: 2021-05-14

## 2021-05-14 RX ORDER — HEPARIN SODIUM (PORCINE) LOCK FLUSH IV SOLN 100 UNIT/ML 100 UNIT/ML
500 SOLUTION INTRAVENOUS PRN
Status: DISCONTINUED | OUTPATIENT
Start: 2021-05-14 | End: 2021-05-15 | Stop reason: HOSPADM

## 2021-05-14 RX ORDER — SODIUM CHLORIDE 9 MG/ML
INJECTION, SOLUTION INTRAVENOUS CONTINUOUS
Status: CANCELLED | OUTPATIENT
Start: 2021-05-14

## 2021-05-14 RX ORDER — SODIUM CHLORIDE 9 MG/ML
20 INJECTION, SOLUTION INTRAVENOUS ONCE
Status: DISCONTINUED | OUTPATIENT
Start: 2021-05-14 | End: 2021-05-15 | Stop reason: HOSPADM

## 2021-05-14 RX ADMIN — HEPARIN 500 UNITS: 100 SYRINGE at 12:27

## 2021-05-14 RX ADMIN — SODIUM CHLORIDE 200 MG: 9 INJECTION, SOLUTION INTRAVENOUS at 11:48

## 2021-05-14 RX ADMIN — SODIUM CHLORIDE, PRESERVATIVE FREE 10 ML: 5 INJECTION INTRAVENOUS at 12:27

## 2021-06-03 DIAGNOSIS — E03.9 HYPOTHYROIDISM, UNSPECIFIED TYPE: Primary | ICD-10-CM

## 2021-06-21 RX ORDER — HEPARIN SODIUM (PORCINE) LOCK FLUSH IV SOLN 100 UNIT/ML 100 UNIT/ML
500 SOLUTION INTRAVENOUS PRN
Status: CANCELLED | OUTPATIENT
Start: 2021-06-25

## 2021-06-21 RX ORDER — SODIUM CHLORIDE 9 MG/ML
20 INJECTION, SOLUTION INTRAVENOUS ONCE
Status: CANCELLED | OUTPATIENT
Start: 2021-06-25 | End: 2021-06-25

## 2021-06-21 RX ORDER — SODIUM CHLORIDE 9 MG/ML
INJECTION, SOLUTION INTRAVENOUS CONTINUOUS
Status: CANCELLED | OUTPATIENT
Start: 2021-06-25

## 2021-06-21 RX ORDER — MEPERIDINE HYDROCHLORIDE 25 MG/ML
12.5 INJECTION INTRAMUSCULAR; INTRAVENOUS; SUBCUTANEOUS ONCE
Status: CANCELLED | OUTPATIENT
Start: 2021-06-25 | End: 2021-06-25

## 2021-06-21 RX ORDER — DIPHENHYDRAMINE HYDROCHLORIDE 50 MG/ML
50 INJECTION INTRAMUSCULAR; INTRAVENOUS ONCE
Status: CANCELLED | OUTPATIENT
Start: 2021-06-25 | End: 2021-06-25

## 2021-06-21 RX ORDER — SODIUM CHLORIDE 0.9 % (FLUSH) 0.9 %
5 SYRINGE (ML) INJECTION PRN
Status: CANCELLED | OUTPATIENT
Start: 2021-06-25

## 2021-06-21 RX ORDER — METHYLPREDNISOLONE SODIUM SUCCINATE 125 MG/2ML
125 INJECTION, POWDER, LYOPHILIZED, FOR SOLUTION INTRAMUSCULAR; INTRAVENOUS ONCE
Status: CANCELLED | OUTPATIENT
Start: 2021-06-25 | End: 2021-06-25

## 2021-06-21 RX ORDER — EPINEPHRINE 1 MG/ML
0.3 INJECTION, SOLUTION, CONCENTRATE INTRAVENOUS PRN
Status: CANCELLED | OUTPATIENT
Start: 2021-06-25

## 2021-06-21 RX ORDER — SODIUM CHLORIDE 0.9 % (FLUSH) 0.9 %
10 SYRINGE (ML) INJECTION PRN
Status: CANCELLED | OUTPATIENT
Start: 2021-06-25

## 2021-06-25 ENCOUNTER — HOSPITAL ENCOUNTER (OUTPATIENT)
Dept: INFUSION THERAPY | Age: 56
Discharge: HOME | End: 2021-06-25
Payer: MEDICARE

## 2021-06-25 VITALS
HEIGHT: 65 IN | BODY MASS INDEX: 27.49 KG/M2 | RESPIRATION RATE: 16 BRPM | SYSTOLIC BLOOD PRESSURE: 137 MMHG | DIASTOLIC BLOOD PRESSURE: 76 MMHG | OXYGEN SATURATION: 97 % | WEIGHT: 165 LBS | TEMPERATURE: 97.6 F | HEART RATE: 79 BPM

## 2021-06-25 DIAGNOSIS — C34.92 CARCINOMA OF LEFT LUNG (HCC): ICD-10-CM

## 2021-06-25 DIAGNOSIS — E03.9 HYPOTHYROIDISM, UNSPECIFIED TYPE: Primary | ICD-10-CM

## 2021-06-25 LAB
ALBUMIN SERPL-MCNC: 4.3 GM/DL (ref 3.4–5)
ALP BLD-CCNC: 117 IU/L (ref 40–128)
ALT SERPL-CCNC: 17 U/L (ref 10–40)
ANION GAP SERPL CALCULATED.3IONS-SCNC: 13 MMOL/L (ref 4–16)
AST SERPL-CCNC: 23 IU/L (ref 15–37)
BASOPHILS ABSOLUTE: 0 K/CU MM
BASOPHILS RELATIVE PERCENT: 0.5 % (ref 0–1)
BILIRUB SERPL-MCNC: 0.2 MG/DL (ref 0–1)
BUN BLDV-MCNC: 15 MG/DL (ref 6–23)
CALCIUM SERPL-MCNC: 8.5 MG/DL (ref 8.3–10.6)
CHLORIDE BLD-SCNC: 100 MMOL/L (ref 99–110)
CO2: 24 MMOL/L (ref 21–32)
CREAT SERPL-MCNC: 0.9 MG/DL (ref 0.6–1.1)
DIFFERENTIAL TYPE: ABNORMAL
EOSINOPHILS ABSOLUTE: 0.2 K/CU MM
EOSINOPHILS RELATIVE PERCENT: 2.6 % (ref 0–3)
GFR AFRICAN AMERICAN: >60 ML/MIN/1.73M2
GFR NON-AFRICAN AMERICAN: >60 ML/MIN/1.73M2
GLUCOSE BLD-MCNC: 104 MG/DL (ref 70–99)
HCT VFR BLD CALC: 40.7 % (ref 37–47)
HEMOGLOBIN: 13.1 GM/DL (ref 12.5–16)
LYMPHOCYTES ABSOLUTE: 2.8 K/CU MM
LYMPHOCYTES RELATIVE PERCENT: 35.3 % (ref 24–44)
MCH RBC QN AUTO: 28.8 PG (ref 27–31)
MCHC RBC AUTO-ENTMCNC: 32.2 % (ref 32–36)
MCV RBC AUTO: 89.5 FL (ref 78–100)
MONOCYTES ABSOLUTE: 0.5 K/CU MM
MONOCYTES RELATIVE PERCENT: 6.7 % (ref 0–4)
PDW BLD-RTO: 13.7 % (ref 11.7–14.9)
PLATELET # BLD: 324 K/CU MM (ref 140–440)
PMV BLD AUTO: 9.3 FL (ref 7.5–11.1)
POTASSIUM SERPL-SCNC: 4.1 MMOL/L (ref 3.5–5.1)
RBC # BLD: 4.55 M/CU MM (ref 4.2–5.4)
SEGMENTED NEUTROPHILS ABSOLUTE COUNT: 4.4 K/CU MM
SEGMENTED NEUTROPHILS RELATIVE PERCENT: 54.9 % (ref 36–66)
SODIUM BLD-SCNC: 137 MMOL/L (ref 135–145)
T4 FREE: 0.97 NG/DL (ref 0.9–1.8)
TOTAL PROTEIN: 7.1 GM/DL (ref 6.4–8.2)
TSH HIGH SENSITIVITY: 2.32 UIU/ML (ref 0.27–4.2)
WBC # BLD: 8.1 K/CU MM (ref 4–10.5)

## 2021-06-25 PROCEDURE — 84439 ASSAY OF FREE THYROXINE: CPT

## 2021-06-25 PROCEDURE — 96413 CHEMO IV INFUSION 1 HR: CPT

## 2021-06-25 PROCEDURE — 6360000002 HC RX W HCPCS: Performed by: INTERNAL MEDICINE

## 2021-06-25 PROCEDURE — 2580000003 HC RX 258: Performed by: INTERNAL MEDICINE

## 2021-06-25 PROCEDURE — 80053 COMPREHEN METABOLIC PANEL: CPT

## 2021-06-25 PROCEDURE — 85025 COMPLETE CBC W/AUTO DIFF WBC: CPT

## 2021-06-25 PROCEDURE — 84443 ASSAY THYROID STIM HORMONE: CPT

## 2021-06-25 RX ORDER — HEPARIN SODIUM (PORCINE) LOCK FLUSH IV SOLN 100 UNIT/ML 100 UNIT/ML
500 SOLUTION INTRAVENOUS PRN
Status: DISCONTINUED | OUTPATIENT
Start: 2021-06-25 | End: 2021-06-26 | Stop reason: HOSPADM

## 2021-06-25 RX ORDER — SODIUM CHLORIDE 9 MG/ML
20 INJECTION, SOLUTION INTRAVENOUS ONCE
Status: DISCONTINUED | OUTPATIENT
Start: 2021-06-25 | End: 2021-06-26 | Stop reason: HOSPADM

## 2021-06-25 RX ORDER — SODIUM CHLORIDE 0.9 % (FLUSH) 0.9 %
10 SYRINGE (ML) INJECTION PRN
Status: DISCONTINUED | OUTPATIENT
Start: 2021-06-25 | End: 2021-06-26 | Stop reason: HOSPADM

## 2021-06-25 RX ADMIN — SODIUM CHLORIDE, PRESERVATIVE FREE 10 ML: 5 INJECTION INTRAVENOUS at 12:42

## 2021-06-25 RX ADMIN — HEPARIN 500 UNITS: 100 SYRINGE at 12:42

## 2021-06-25 RX ADMIN — SODIUM CHLORIDE 20 ML/HR: 9 INJECTION, SOLUTION INTRAVENOUS at 11:49

## 2021-06-25 RX ADMIN — SODIUM CHLORIDE 200 MG: 9 INJECTION, SOLUTION INTRAVENOUS at 12:01

## 2021-06-25 NOTE — PROGRESS NOTES
Here for Keytruda. Pt voiced no issues or concerns. Mediport accessed, + blood return noted. CBCD WDL for treatment. Pt agreeable to POC. Treatment administered as ordered. Tolerated well. Reviewed AVS, copy given to patient. Discharged in stable condition.

## 2021-07-14 DIAGNOSIS — E03.9 HYPOTHYROIDISM, UNSPECIFIED TYPE: Primary | ICD-10-CM

## 2021-07-15 RX ORDER — EPINEPHRINE 1 MG/ML
0.3 INJECTION, SOLUTION, CONCENTRATE INTRAVENOUS PRN
Status: CANCELLED | OUTPATIENT
Start: 2021-07-16

## 2021-07-15 RX ORDER — METHYLPREDNISOLONE SODIUM SUCCINATE 125 MG/2ML
125 INJECTION, POWDER, LYOPHILIZED, FOR SOLUTION INTRAMUSCULAR; INTRAVENOUS ONCE
Status: CANCELLED | OUTPATIENT
Start: 2021-07-16 | End: 2021-07-16

## 2021-07-15 RX ORDER — SODIUM CHLORIDE 0.9 % (FLUSH) 0.9 %
5 SYRINGE (ML) INJECTION PRN
Status: CANCELLED | OUTPATIENT
Start: 2021-07-16

## 2021-07-15 RX ORDER — SODIUM CHLORIDE 9 MG/ML
INJECTION, SOLUTION INTRAVENOUS CONTINUOUS
Status: CANCELLED | OUTPATIENT
Start: 2021-07-16

## 2021-07-15 RX ORDER — MEPERIDINE HYDROCHLORIDE 25 MG/ML
12.5 INJECTION INTRAMUSCULAR; INTRAVENOUS; SUBCUTANEOUS ONCE
Status: CANCELLED | OUTPATIENT
Start: 2021-07-16 | End: 2021-07-16

## 2021-07-15 RX ORDER — SODIUM CHLORIDE 9 MG/ML
20 INJECTION, SOLUTION INTRAVENOUS ONCE
Status: CANCELLED | OUTPATIENT
Start: 2021-07-16 | End: 2021-07-16

## 2021-07-15 RX ORDER — DIPHENHYDRAMINE HYDROCHLORIDE 50 MG/ML
50 INJECTION INTRAMUSCULAR; INTRAVENOUS ONCE
Status: CANCELLED | OUTPATIENT
Start: 2021-07-16 | End: 2021-07-16

## 2021-07-16 ENCOUNTER — OFFICE VISIT (OUTPATIENT)
Dept: ONCOLOGY | Age: 56
End: 2021-07-16
Payer: MEDICARE

## 2021-07-16 ENCOUNTER — HOSPITAL ENCOUNTER (OUTPATIENT)
Dept: INFUSION THERAPY | Age: 56
Discharge: HOME | End: 2021-07-16
Payer: MEDICARE

## 2021-07-16 VITALS
WEIGHT: 161.6 LBS | DIASTOLIC BLOOD PRESSURE: 80 MMHG | OXYGEN SATURATION: 96 % | SYSTOLIC BLOOD PRESSURE: 140 MMHG | HEIGHT: 65 IN | BODY MASS INDEX: 26.92 KG/M2 | TEMPERATURE: 97.8 F | HEART RATE: 87 BPM

## 2021-07-16 DIAGNOSIS — C34.92 CARCINOMA OF LEFT LUNG (HCC): ICD-10-CM

## 2021-07-16 DIAGNOSIS — E03.9 HYPOTHYROIDISM, UNSPECIFIED TYPE: Primary | ICD-10-CM

## 2021-07-16 DIAGNOSIS — C34.92 CARCINOMA OF LEFT LUNG (HCC): Primary | ICD-10-CM

## 2021-07-16 LAB
ALBUMIN SERPL-MCNC: 4.7 GM/DL (ref 3.4–5)
ALP BLD-CCNC: 111 IU/L (ref 40–129)
ALT SERPL-CCNC: 16 U/L (ref 10–40)
ANION GAP SERPL CALCULATED.3IONS-SCNC: 13 MMOL/L (ref 4–16)
AST SERPL-CCNC: 19 IU/L (ref 15–37)
BASOPHILS ABSOLUTE: 0 K/CU MM
BASOPHILS RELATIVE PERCENT: 0.2 % (ref 0–1)
BILIRUB SERPL-MCNC: 0.2 MG/DL (ref 0–1)
BUN BLDV-MCNC: 11 MG/DL (ref 6–23)
CALCIUM SERPL-MCNC: 8.2 MG/DL (ref 8.3–10.6)
CHLORIDE BLD-SCNC: 99 MMOL/L (ref 99–110)
CO2: 23 MMOL/L (ref 21–32)
CREAT SERPL-MCNC: 0.9 MG/DL (ref 0.6–1.1)
DIFFERENTIAL TYPE: ABNORMAL
EOSINOPHILS ABSOLUTE: 0.2 K/CU MM
EOSINOPHILS RELATIVE PERCENT: 1.8 % (ref 0–3)
GFR AFRICAN AMERICAN: >60 ML/MIN/1.73M2
GFR NON-AFRICAN AMERICAN: >60 ML/MIN/1.73M2
GLUCOSE BLD-MCNC: 90 MG/DL (ref 70–99)
HCT VFR BLD CALC: 40.5 % (ref 37–47)
HEMOGLOBIN: 13.2 GM/DL (ref 12.5–16)
LYMPHOCYTES ABSOLUTE: 2.9 K/CU MM
LYMPHOCYTES RELATIVE PERCENT: 34.1 % (ref 24–44)
MCH RBC QN AUTO: 29 PG (ref 27–31)
MCHC RBC AUTO-ENTMCNC: 32.6 % (ref 32–36)
MCV RBC AUTO: 89 FL (ref 78–100)
MONOCYTES ABSOLUTE: 0.7 K/CU MM
MONOCYTES RELATIVE PERCENT: 8.1 % (ref 0–4)
PDW BLD-RTO: 13.9 % (ref 11.7–14.9)
PLATELET # BLD: 308 K/CU MM (ref 140–440)
PMV BLD AUTO: 8.9 FL (ref 7.5–11.1)
POTASSIUM SERPL-SCNC: 4.1 MMOL/L (ref 3.5–5.1)
RBC # BLD: 4.55 M/CU MM (ref 4.2–5.4)
SEGMENTED NEUTROPHILS ABSOLUTE COUNT: 4.7 K/CU MM
SEGMENTED NEUTROPHILS RELATIVE PERCENT: 55.8 % (ref 36–66)
SODIUM BLD-SCNC: 135 MMOL/L (ref 135–145)
T4 FREE: 0.95 NG/DL (ref 0.9–1.8)
TOTAL PROTEIN: 6.9 GM/DL (ref 6.4–8.2)
TSH HIGH SENSITIVITY: 2.44 UIU/ML (ref 0.27–4.2)
WBC # BLD: 8.4 K/CU MM (ref 4–10.5)

## 2021-07-16 PROCEDURE — 85025 COMPLETE CBC W/AUTO DIFF WBC: CPT

## 2021-07-16 PROCEDURE — 36591 DRAW BLOOD OFF VENOUS DEVICE: CPT

## 2021-07-16 PROCEDURE — 6360000002 HC RX W HCPCS: Performed by: INTERNAL MEDICINE

## 2021-07-16 PROCEDURE — 84443 ASSAY THYROID STIM HORMONE: CPT

## 2021-07-16 PROCEDURE — 80053 COMPREHEN METABOLIC PANEL: CPT

## 2021-07-16 PROCEDURE — 84439 ASSAY OF FREE THYROXINE: CPT

## 2021-07-16 PROCEDURE — 2580000003 HC RX 258: Performed by: INTERNAL MEDICINE

## 2021-07-16 PROCEDURE — 96413 CHEMO IV INFUSION 1 HR: CPT

## 2021-07-16 PROCEDURE — 99214 OFFICE O/P EST MOD 30 MIN: CPT | Performed by: INTERNAL MEDICINE

## 2021-07-16 RX ORDER — SODIUM CHLORIDE 0.9 % (FLUSH) 0.9 %
10 SYRINGE (ML) INJECTION PRN
Status: DISCONTINUED | OUTPATIENT
Start: 2021-07-16 | End: 2021-07-17 | Stop reason: HOSPADM

## 2021-07-16 RX ORDER — HEPARIN SODIUM (PORCINE) LOCK FLUSH IV SOLN 100 UNIT/ML 100 UNIT/ML
500 SOLUTION INTRAVENOUS PRN
Status: DISCONTINUED | OUTPATIENT
Start: 2021-07-16 | End: 2021-07-17 | Stop reason: HOSPADM

## 2021-07-16 RX ADMIN — HEPARIN 500 UNITS: 100 SYRINGE at 13:08

## 2021-07-16 RX ADMIN — SODIUM CHLORIDE 200 MG: 9 INJECTION, SOLUTION INTRAVENOUS at 12:28

## 2021-07-16 RX ADMIN — SODIUM CHLORIDE, PRESERVATIVE FREE 10 ML: 5 INJECTION INTRAVENOUS at 13:08

## 2021-07-16 ASSESSMENT — PATIENT HEALTH QUESTIONNAIRE - PHQ9
5. POOR APPETITE OR OVEREATING: 0
SUM OF ALL RESPONSES TO PHQ QUESTIONS 1-9: 8
6. FEELING BAD ABOUT YOURSELF - OR THAT YOU ARE A FAILURE OR HAVE LET YOURSELF OR YOUR FAMILY DOWN: 1
SUM OF ALL RESPONSES TO PHQ QUESTIONS 1-9: 8
1. LITTLE INTEREST OR PLEASURE IN DOING THINGS: 0
9. THOUGHTS THAT YOU WOULD BE BETTER OFF DEAD, OR OF HURTING YOURSELF: 0
SUM OF ALL RESPONSES TO PHQ9 QUESTIONS 1 & 2: 3
3. TROUBLE FALLING OR STAYING ASLEEP: 1
4. FEELING TIRED OR HAVING LITTLE ENERGY: 1
7. TROUBLE CONCENTRATING ON THINGS, SUCH AS READING THE NEWSPAPER OR WATCHING TELEVISION: 1
8. MOVING OR SPEAKING SO SLOWLY THAT OTHER PEOPLE COULD HAVE NOTICED. OR THE OPPOSITE, BEING SO FIGETY OR RESTLESS THAT YOU HAVE BEEN MOVING AROUND A LOT MORE THAN USUAL: 1
2. FEELING DOWN, DEPRESSED OR HOPELESS: 3
SUM OF ALL RESPONSES TO PHQ QUESTIONS 1-9: 8
10. IF YOU CHECKED OFF ANY PROBLEMS, HOW DIFFICULT HAVE THESE PROBLEMS MADE IT FOR YOU TO DO YOUR WORK, TAKE CARE OF THINGS AT HOME, OR GET ALONG WITH OTHER PEOPLE: 1
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
## 2021-07-16 NOTE — PROGRESS NOTES
Patient arrived to treatment suite for blood draw, and immunotherapy infusion. Right chest mediport accessed and blood drawn from site and sent to lab for processing. Patient already visited with Dr. Jeronimo Christopher prior to coming to treatment suite, so no needs. Treatment approved and given. Patient tolerated well. Left treatment suite ambulatory. Patient's status assessed and documented appropriately. All labs and required results were also reviewed today. Treatment parameters have been reviewed. Today's treatment has been approved by the provider. Treatment orders and medication sequencing (when applicable) was verified by 2 registered nurses. The treatment plan was confirmed with the patient prior to administration, and the patient understands the need to report any treatment-related symptoms. Prior to administration, when applicable, the following 8 elements of medication administration were reviewed with 2nd Registered Nurse prior to dosing: drug name, drug dose, infusion volume when prepared in a syringe, rate of administration, expiration dates and/or times, appearance and integrity of drug(s), and rate of pump for infusion. The 5 rights of medication administration have been verified. Pt here for C3.   Arrives Ambulating independently, accompanied by Friend           Patient denies possible pregnancy since last therapy cycle: Not Applicable    Modifications in dose or schedule: No     Frequency of blood return and site check throughout a

## 2021-07-16 NOTE — PROGRESS NOTES
MA Rooming Questions  Patient: Betito Fox  MRN: [id number]    Date: 7/16/2021        1. Do you have any new issues?   no         2. Do you need any refills on medications?    no    3. Have you had any imaging done since your last visit? yes - [facility]    4. Have you been hospitalized or seen in the emergency room since your last visit here?   yes - [facility]    5. Did the patient have a depression screening completed today?  Yes    PHQ-9 Total Score: 8 (7/16/2021 11:14 AM)  Thoughts that you would be better off dead, or of hurting yourself in some way: 0 (7/16/2021 11:14 AM)       PHQ-9 Given to (if applicable):               PHQ-9 Score (if applicable):                     [] Positive     []  Negative              Does question #9 need addressed (if applicable)                     [] Yes    []  No               Lennie Foote CMA

## 2021-07-20 RX ORDER — SERTRALINE HYDROCHLORIDE 100 MG/1
TABLET, FILM COATED ORAL
Qty: 30 TABLET | Refills: 1 | Status: SHIPPED | OUTPATIENT
Start: 2021-07-20 | End: 2022-06-01

## 2021-07-29 DIAGNOSIS — C34.92 CARCINOMA OF LEFT LUNG (HCC): ICD-10-CM

## 2021-07-29 DIAGNOSIS — E03.9 HYPOTHYROIDISM, UNSPECIFIED TYPE: Primary | ICD-10-CM

## 2021-07-30 ENCOUNTER — TELEPHONE (OUTPATIENT)
Dept: INFUSION THERAPY | Age: 56
End: 2021-07-30

## 2021-07-30 NOTE — TELEPHONE ENCOUNTER
Pt's POA called to cancel tx appt on 8/6 & move to 8/13 as pt will be on vacation; date moved & confirmed

## 2021-09-01 RX ORDER — METHYLPREDNISOLONE SODIUM SUCCINATE 125 MG/2ML
125 INJECTION, POWDER, LYOPHILIZED, FOR SOLUTION INTRAMUSCULAR; INTRAVENOUS ONCE
Status: CANCELLED | OUTPATIENT
Start: 2021-09-03 | End: 2021-09-03

## 2021-09-01 RX ORDER — EPINEPHRINE 1 MG/ML
0.3 INJECTION, SOLUTION, CONCENTRATE INTRAVENOUS PRN
Status: CANCELLED | OUTPATIENT
Start: 2021-09-03

## 2021-09-01 RX ORDER — SODIUM CHLORIDE 9 MG/ML
INJECTION, SOLUTION INTRAVENOUS CONTINUOUS
Status: CANCELLED | OUTPATIENT
Start: 2021-09-03

## 2021-09-01 RX ORDER — SODIUM CHLORIDE 0.9 % (FLUSH) 0.9 %
5 SYRINGE (ML) INJECTION PRN
Status: CANCELLED | OUTPATIENT
Start: 2021-09-03

## 2021-09-01 RX ORDER — HEPARIN SODIUM (PORCINE) LOCK FLUSH IV SOLN 100 UNIT/ML 100 UNIT/ML
500 SOLUTION INTRAVENOUS PRN
Status: CANCELLED | OUTPATIENT
Start: 2021-09-03

## 2021-09-01 RX ORDER — DIPHENHYDRAMINE HYDROCHLORIDE 50 MG/ML
50 INJECTION INTRAMUSCULAR; INTRAVENOUS ONCE
Status: CANCELLED | OUTPATIENT
Start: 2021-09-03 | End: 2021-09-03

## 2021-09-01 RX ORDER — MEPERIDINE HYDROCHLORIDE 25 MG/ML
12.5 INJECTION INTRAMUSCULAR; INTRAVENOUS; SUBCUTANEOUS ONCE
Status: CANCELLED | OUTPATIENT
Start: 2021-09-03 | End: 2021-09-03

## 2021-09-01 RX ORDER — SODIUM CHLORIDE 0.9 % (FLUSH) 0.9 %
10 SYRINGE (ML) INJECTION PRN
Status: CANCELLED | OUTPATIENT
Start: 2021-09-03

## 2021-09-01 RX ORDER — SODIUM CHLORIDE 9 MG/ML
20 INJECTION, SOLUTION INTRAVENOUS ONCE
Status: CANCELLED | OUTPATIENT
Start: 2021-09-03 | End: 2021-09-03

## 2021-09-03 ENCOUNTER — TELEPHONE (OUTPATIENT)
Dept: INFUSION THERAPY | Age: 56
End: 2021-09-03

## 2021-09-22 DIAGNOSIS — E03.9 HYPOTHYROIDISM, UNSPECIFIED TYPE: Primary | ICD-10-CM

## 2021-09-22 RX ORDER — MEPERIDINE HYDROCHLORIDE 25 MG/ML
12.5 INJECTION INTRAMUSCULAR; INTRAVENOUS; SUBCUTANEOUS ONCE
Status: CANCELLED | OUTPATIENT
Start: 2021-09-24 | End: 2021-09-24

## 2021-09-22 RX ORDER — SODIUM CHLORIDE 9 MG/ML
20 INJECTION, SOLUTION INTRAVENOUS ONCE
Status: CANCELLED | OUTPATIENT
Start: 2021-09-24 | End: 2021-09-24

## 2021-09-22 RX ORDER — DIPHENHYDRAMINE HYDROCHLORIDE 50 MG/ML
50 INJECTION INTRAMUSCULAR; INTRAVENOUS ONCE
Status: CANCELLED | OUTPATIENT
Start: 2021-09-24 | End: 2021-09-24

## 2021-09-22 RX ORDER — EPINEPHRINE 1 MG/ML
0.3 INJECTION, SOLUTION, CONCENTRATE INTRAVENOUS PRN
Status: CANCELLED | OUTPATIENT
Start: 2021-09-24

## 2021-09-22 RX ORDER — HEPARIN SODIUM (PORCINE) LOCK FLUSH IV SOLN 100 UNIT/ML 100 UNIT/ML
500 SOLUTION INTRAVENOUS PRN
Status: CANCELLED | OUTPATIENT
Start: 2021-09-24

## 2021-09-22 RX ORDER — SODIUM CHLORIDE 0.9 % (FLUSH) 0.9 %
10 SYRINGE (ML) INJECTION PRN
Status: CANCELLED | OUTPATIENT
Start: 2021-09-24

## 2021-09-22 RX ORDER — SODIUM CHLORIDE 9 MG/ML
INJECTION, SOLUTION INTRAVENOUS CONTINUOUS
Status: CANCELLED | OUTPATIENT
Start: 2021-09-24

## 2021-09-22 RX ORDER — SODIUM CHLORIDE 0.9 % (FLUSH) 0.9 %
5 SYRINGE (ML) INJECTION PRN
Status: CANCELLED | OUTPATIENT
Start: 2021-09-24

## 2021-09-22 RX ORDER — METHYLPREDNISOLONE SODIUM SUCCINATE 125 MG/2ML
125 INJECTION, POWDER, LYOPHILIZED, FOR SOLUTION INTRAMUSCULAR; INTRAVENOUS ONCE
Status: CANCELLED | OUTPATIENT
Start: 2021-09-24 | End: 2021-09-24

## 2021-10-01 ENCOUNTER — TELEPHONE (OUTPATIENT)
Dept: ONCOLOGY | Age: 56
End: 2021-10-01

## 2021-10-01 ENCOUNTER — TELEPHONE (OUTPATIENT)
Dept: INFUSION THERAPY | Age: 56
End: 2021-10-01

## 2021-10-01 NOTE — TELEPHONE ENCOUNTER
Called patient regarding her CT scans on 10.27.2021 at [facility] arr 0900. Went over prep and left this information on her voicemail. Along with my number for questions.

## 2021-10-01 NOTE — TELEPHONE ENCOUNTER
Returned call to pt's sister @ [phone] & LVM with new appt date for 10/7 @ 1:45; this is rescheduled from her cancelled appt on 9/24. Asked for a return call to verify this date works for them.

## 2021-10-07 ENCOUNTER — HOSPITAL ENCOUNTER (OUTPATIENT)
Dept: INFUSION THERAPY | Age: 56
Discharge: HOME | End: 2021-10-07
Payer: MEDICARE

## 2021-10-07 VITALS
SYSTOLIC BLOOD PRESSURE: 137 MMHG | DIASTOLIC BLOOD PRESSURE: 79 MMHG | TEMPERATURE: 97.7 F | BODY MASS INDEX: 26.89 KG/M2 | HEIGHT: 65 IN | OXYGEN SATURATION: 96 %

## 2021-10-07 DIAGNOSIS — E03.9 HYPOTHYROIDISM, UNSPECIFIED TYPE: Primary | ICD-10-CM

## 2021-10-07 DIAGNOSIS — C34.92 CARCINOMA OF LEFT LUNG (HCC): ICD-10-CM

## 2021-10-07 LAB
ALBUMIN SERPL-MCNC: 4.2 GM/DL (ref 3.4–5)
ALP BLD-CCNC: 110 IU/L (ref 40–128)
ALT SERPL-CCNC: 17 U/L (ref 10–40)
ANION GAP SERPL CALCULATED.3IONS-SCNC: 14 MMOL/L (ref 4–16)
AST SERPL-CCNC: 20 IU/L (ref 15–37)
BASOPHILS ABSOLUTE: 0 K/CU MM
BASOPHILS RELATIVE PERCENT: 0.3 % (ref 0–1)
BILIRUB SERPL-MCNC: 0.2 MG/DL (ref 0–1)
BUN BLDV-MCNC: 11 MG/DL (ref 6–23)
CALCIUM SERPL-MCNC: 7.7 MG/DL (ref 8.3–10.6)
CHLORIDE BLD-SCNC: 101 MMOL/L (ref 99–110)
CO2: 23 MMOL/L (ref 21–32)
CREAT SERPL-MCNC: 1 MG/DL (ref 0.6–1.1)
DIFFERENTIAL TYPE: ABNORMAL
EOSINOPHILS ABSOLUTE: 0.1 K/CU MM
EOSINOPHILS RELATIVE PERCENT: 1.2 % (ref 0–3)
GFR AFRICAN AMERICAN: >60 ML/MIN/1.73M2
GFR NON-AFRICAN AMERICAN: 57 ML/MIN/1.73M2
GLUCOSE BLD-MCNC: 86 MG/DL (ref 70–99)
HCT VFR BLD CALC: 40.1 % (ref 37–47)
HEMOGLOBIN: 13.2 GM/DL (ref 12.5–16)
LYMPHOCYTES ABSOLUTE: 3 K/CU MM
LYMPHOCYTES RELATIVE PERCENT: 31.9 % (ref 24–44)
MCH RBC QN AUTO: 29.4 PG (ref 27–31)
MCHC RBC AUTO-ENTMCNC: 32.9 % (ref 32–36)
MCV RBC AUTO: 89.3 FL (ref 78–100)
MONOCYTES ABSOLUTE: 0.7 K/CU MM
MONOCYTES RELATIVE PERCENT: 7.4 % (ref 0–4)
PDW BLD-RTO: 13.8 % (ref 11.7–14.9)
PLATELET # BLD: 315 K/CU MM (ref 140–440)
PMV BLD AUTO: 9 FL (ref 7.5–11.1)
POTASSIUM SERPL-SCNC: 3.5 MMOL/L (ref 3.5–5.1)
RBC # BLD: 4.49 M/CU MM (ref 4.2–5.4)
SEGMENTED NEUTROPHILS ABSOLUTE COUNT: 5.6 K/CU MM
SEGMENTED NEUTROPHILS RELATIVE PERCENT: 59.2 % (ref 36–66)
SODIUM BLD-SCNC: 138 MMOL/L (ref 135–145)
T4 FREE: 0.92 NG/DL (ref 0.9–1.8)
TOTAL PROTEIN: 7.1 GM/DL (ref 6.4–8.2)
WBC # BLD: 9.4 K/CU MM (ref 4–10.5)

## 2021-10-07 PROCEDURE — 96413 CHEMO IV INFUSION 1 HR: CPT

## 2021-10-07 PROCEDURE — 36415 COLL VENOUS BLD VENIPUNCTURE: CPT

## 2021-10-07 PROCEDURE — 80053 COMPREHEN METABOLIC PANEL: CPT

## 2021-10-07 PROCEDURE — 85025 COMPLETE CBC W/AUTO DIFF WBC: CPT

## 2021-10-07 PROCEDURE — 84439 ASSAY OF FREE THYROXINE: CPT

## 2021-10-07 PROCEDURE — 2580000003 HC RX 258: Performed by: INTERNAL MEDICINE

## 2021-10-07 PROCEDURE — 6360000002 HC RX W HCPCS: Performed by: INTERNAL MEDICINE

## 2021-10-07 RX ORDER — METHYLPREDNISOLONE SODIUM SUCCINATE 125 MG/2ML
125 INJECTION, POWDER, LYOPHILIZED, FOR SOLUTION INTRAMUSCULAR; INTRAVENOUS ONCE
Status: CANCELLED | OUTPATIENT
Start: 2021-10-07 | End: 2021-10-07

## 2021-10-07 RX ORDER — HEPARIN SODIUM (PORCINE) LOCK FLUSH IV SOLN 100 UNIT/ML 100 UNIT/ML
500 SOLUTION INTRAVENOUS PRN
Status: DISCONTINUED | OUTPATIENT
Start: 2021-10-07 | End: 2021-10-08 | Stop reason: HOSPADM

## 2021-10-07 RX ORDER — SODIUM CHLORIDE 9 MG/ML
20 INJECTION, SOLUTION INTRAVENOUS ONCE
Status: DISCONTINUED | OUTPATIENT
Start: 2021-10-07 | End: 2021-10-08 | Stop reason: HOSPADM

## 2021-10-07 RX ORDER — SODIUM CHLORIDE 9 MG/ML
INJECTION, SOLUTION INTRAVENOUS CONTINUOUS
Status: CANCELLED | OUTPATIENT
Start: 2021-10-07

## 2021-10-07 RX ORDER — SODIUM CHLORIDE 0.9 % (FLUSH) 0.9 %
10 SYRINGE (ML) INJECTION PRN
Status: DISCONTINUED | OUTPATIENT
Start: 2021-10-07 | End: 2021-10-08 | Stop reason: HOSPADM

## 2021-10-07 RX ORDER — SODIUM CHLORIDE 0.9 % (FLUSH) 0.9 %
5 SYRINGE (ML) INJECTION PRN
Status: CANCELLED | OUTPATIENT
Start: 2021-10-07

## 2021-10-07 RX ORDER — SODIUM CHLORIDE 0.9 % (FLUSH) 0.9 %
10 SYRINGE (ML) INJECTION PRN
Status: CANCELLED | OUTPATIENT
Start: 2021-10-07

## 2021-10-07 RX ORDER — HEPARIN SODIUM (PORCINE) LOCK FLUSH IV SOLN 100 UNIT/ML 100 UNIT/ML
500 SOLUTION INTRAVENOUS PRN
Status: CANCELLED | OUTPATIENT
Start: 2021-10-07

## 2021-10-07 RX ORDER — DIPHENHYDRAMINE HYDROCHLORIDE 50 MG/ML
50 INJECTION INTRAMUSCULAR; INTRAVENOUS ONCE
Status: CANCELLED | OUTPATIENT
Start: 2021-10-07 | End: 2021-10-07

## 2021-10-07 RX ORDER — SODIUM CHLORIDE 9 MG/ML
20 INJECTION, SOLUTION INTRAVENOUS ONCE
Status: CANCELLED | OUTPATIENT
Start: 2021-10-07 | End: 2021-10-07

## 2021-10-07 RX ORDER — MEPERIDINE HYDROCHLORIDE 50 MG/ML
12.5 INJECTION INTRAMUSCULAR; INTRAVENOUS; SUBCUTANEOUS ONCE
Status: CANCELLED | OUTPATIENT
Start: 2021-10-07 | End: 2021-10-07

## 2021-10-07 RX ORDER — EPINEPHRINE 1 MG/ML
0.3 INJECTION, SOLUTION, CONCENTRATE INTRAVENOUS PRN
Status: CANCELLED | OUTPATIENT
Start: 2021-10-07

## 2021-10-07 RX ADMIN — SODIUM CHLORIDE, PRESERVATIVE FREE 10 ML: 5 INJECTION INTRAVENOUS at 15:25

## 2021-10-07 RX ADMIN — HEPARIN 500 UNITS: 100 SYRINGE at 15:25

## 2021-10-07 RX ADMIN — SODIUM CHLORIDE 20 ML/HR: 9 INJECTION, SOLUTION INTRAVENOUS at 13:51

## 2021-10-07 RX ADMIN — SODIUM CHLORIDE 200 MG: 9 INJECTION, SOLUTION INTRAVENOUS at 14:47

## 2021-10-07 NOTE — PROGRESS NOTES
Pt. Here for treatment. Pt. Denies any questions or concerns. Right upper chest mediport accessed without difficulty, no blood return noted, but port flushes without difficulty. Lab work drawn via peripheral vein. Labs  Reviewed and treatment administered without incident. Patient's status assessed and documented appropriately. All labs and required results were also reviewed today. Treatment parameters have been reviewed. Today's treatment has been approved by the provider. Treatment orders and medication sequencing (when applicable) was verified by 2 registered nurses. The treatment plan was confirmed with the patient prior to administration, and the patient understands the need to report any treatment-related symptoms. Prior to administration, when applicable, the following 8 elements of medication administration were reviewed with 2nd Registered Nurse prior to dosing: drug name, drug dose, infusion volume when prepared in a syringe, rate of administration, expiration dates and/or times, appearance and integrity of drug(s), and rate of pump for infusion. The 5 rights of medication administration have been verified.

## 2021-10-25 DIAGNOSIS — C34.92 CARCINOMA OF LEFT LUNG (HCC): Primary | ICD-10-CM

## 2021-10-25 DIAGNOSIS — E03.9 HYPOTHYROIDISM, UNSPECIFIED TYPE: ICD-10-CM

## 2021-10-29 ENCOUNTER — TELEPHONE (OUTPATIENT)
Dept: INFUSION THERAPY | Age: 56
End: 2021-10-29

## 2021-11-16 RX ORDER — HEPARIN SODIUM (PORCINE) LOCK FLUSH IV SOLN 100 UNIT/ML 100 UNIT/ML
500 SOLUTION INTRAVENOUS PRN
Status: CANCELLED | OUTPATIENT
Start: 2021-11-18

## 2021-11-16 RX ORDER — MEPERIDINE HYDROCHLORIDE 25 MG/ML
12.5 INJECTION INTRAMUSCULAR; INTRAVENOUS; SUBCUTANEOUS ONCE
Status: CANCELLED | OUTPATIENT
Start: 2021-11-18 | End: 2021-11-18

## 2021-11-16 RX ORDER — DIPHENHYDRAMINE HYDROCHLORIDE 50 MG/ML
50 INJECTION INTRAMUSCULAR; INTRAVENOUS ONCE
Status: CANCELLED | OUTPATIENT
Start: 2021-11-18 | End: 2021-11-18

## 2021-11-16 RX ORDER — SODIUM CHLORIDE 0.9 % (FLUSH) 0.9 %
5 SYRINGE (ML) INJECTION PRN
Status: CANCELLED | OUTPATIENT
Start: 2021-11-18

## 2021-11-16 RX ORDER — SODIUM CHLORIDE 0.9 % (FLUSH) 0.9 %
10 SYRINGE (ML) INJECTION PRN
Status: CANCELLED | OUTPATIENT
Start: 2021-11-18

## 2021-11-16 RX ORDER — METHYLPREDNISOLONE SODIUM SUCCINATE 125 MG/2ML
125 INJECTION, POWDER, LYOPHILIZED, FOR SOLUTION INTRAMUSCULAR; INTRAVENOUS ONCE
Status: CANCELLED | OUTPATIENT
Start: 2021-11-18 | End: 2021-11-18

## 2021-11-16 RX ORDER — EPINEPHRINE 1 MG/ML
0.3 INJECTION, SOLUTION, CONCENTRATE INTRAVENOUS PRN
Status: CANCELLED | OUTPATIENT
Start: 2021-11-18

## 2021-11-16 RX ORDER — SODIUM CHLORIDE 9 MG/ML
20 INJECTION, SOLUTION INTRAVENOUS ONCE
Status: CANCELLED | OUTPATIENT
Start: 2021-11-18 | End: 2021-11-18

## 2021-11-16 RX ORDER — SODIUM CHLORIDE 9 MG/ML
INJECTION, SOLUTION INTRAVENOUS CONTINUOUS
Status: CANCELLED | OUTPATIENT
Start: 2021-11-18

## 2021-11-17 ENCOUNTER — TELEPHONE (OUTPATIENT)
Dept: INFUSION THERAPY | Age: 56
End: 2021-11-17

## 2021-11-17 NOTE — TELEPHONE ENCOUNTER
Returned call to pt's sister Joyce Alvarado to reschedule pt's missed tx appt; pt was already on the schedule for 11/18 @ 11:00; sister voice understanding.

## 2021-11-19 ENCOUNTER — TELEPHONE (OUTPATIENT)
Dept: INFUSION THERAPY | Age: 56
End: 2021-11-19

## 2021-12-08 RX ORDER — MEPERIDINE HYDROCHLORIDE 25 MG/ML
12.5 INJECTION INTRAMUSCULAR; INTRAVENOUS; SUBCUTANEOUS ONCE
Status: CANCELLED | OUTPATIENT
Start: 2021-12-09 | End: 2021-12-09

## 2021-12-08 RX ORDER — SODIUM CHLORIDE 9 MG/ML
20 INJECTION, SOLUTION INTRAVENOUS ONCE
Status: CANCELLED | OUTPATIENT
Start: 2021-12-09 | End: 2021-12-09

## 2021-12-08 RX ORDER — SODIUM CHLORIDE 0.9 % (FLUSH) 0.9 %
5 SYRINGE (ML) INJECTION PRN
Status: CANCELLED | OUTPATIENT
Start: 2021-12-09

## 2021-12-08 RX ORDER — METHYLPREDNISOLONE SODIUM SUCCINATE 125 MG/2ML
125 INJECTION, POWDER, LYOPHILIZED, FOR SOLUTION INTRAMUSCULAR; INTRAVENOUS ONCE
Status: CANCELLED | OUTPATIENT
Start: 2021-12-09 | End: 2021-12-09

## 2021-12-08 RX ORDER — DIPHENHYDRAMINE HYDROCHLORIDE 50 MG/ML
50 INJECTION INTRAMUSCULAR; INTRAVENOUS ONCE
Status: CANCELLED | OUTPATIENT
Start: 2021-12-09 | End: 2021-12-09

## 2021-12-08 RX ORDER — EPINEPHRINE 1 MG/ML
0.3 INJECTION, SOLUTION, CONCENTRATE INTRAVENOUS PRN
Status: CANCELLED | OUTPATIENT
Start: 2021-12-09

## 2021-12-08 RX ORDER — SODIUM CHLORIDE 9 MG/ML
INJECTION, SOLUTION INTRAVENOUS CONTINUOUS
Status: CANCELLED | OUTPATIENT
Start: 2021-12-09

## 2021-12-09 ENCOUNTER — HOSPITAL ENCOUNTER (OUTPATIENT)
Dept: INFUSION THERAPY | Age: 56
Discharge: HOME | End: 2021-12-09
Payer: MEDICARE

## 2021-12-09 VITALS
DIASTOLIC BLOOD PRESSURE: 73 MMHG | OXYGEN SATURATION: 96 % | TEMPERATURE: 96.7 F | HEART RATE: 87 BPM | RESPIRATION RATE: 16 BRPM | HEIGHT: 65 IN | BODY MASS INDEX: 26.69 KG/M2 | WEIGHT: 160.2 LBS | SYSTOLIC BLOOD PRESSURE: 137 MMHG

## 2021-12-09 DIAGNOSIS — C34.92 CARCINOMA OF LEFT LUNG (HCC): Primary | ICD-10-CM

## 2021-12-09 DIAGNOSIS — E03.9 HYPOTHYROIDISM, UNSPECIFIED TYPE: ICD-10-CM

## 2021-12-09 LAB
ALBUMIN SERPL-MCNC: 4.3 GM/DL (ref 3.4–5)
ALP BLD-CCNC: 95 IU/L (ref 40–128)
ALT SERPL-CCNC: 15 U/L (ref 10–40)
ANION GAP SERPL CALCULATED.3IONS-SCNC: 14 MMOL/L (ref 4–16)
AST SERPL-CCNC: 18 IU/L (ref 15–37)
BASOPHILS ABSOLUTE: 0 K/CU MM
BASOPHILS RELATIVE PERCENT: 0.4 % (ref 0–1)
BILIRUB SERPL-MCNC: 0.2 MG/DL (ref 0–1)
BUN BLDV-MCNC: 14 MG/DL (ref 6–23)
CALCIUM SERPL-MCNC: 8 MG/DL (ref 8.3–10.6)
CHLORIDE BLD-SCNC: 103 MMOL/L (ref 99–110)
CO2: 22 MMOL/L (ref 21–32)
CREAT SERPL-MCNC: 0.9 MG/DL (ref 0.6–1.1)
DIFFERENTIAL TYPE: ABNORMAL
EOSINOPHILS ABSOLUTE: 0.2 K/CU MM
EOSINOPHILS RELATIVE PERCENT: 2.6 % (ref 0–3)
GFR AFRICAN AMERICAN: >60 ML/MIN/1.73M2
GFR NON-AFRICAN AMERICAN: >60 ML/MIN/1.73M2
GLUCOSE BLD-MCNC: 101 MG/DL (ref 70–99)
HCT VFR BLD CALC: 39.4 % (ref 37–47)
HEMOGLOBIN: 12.8 GM/DL (ref 12.5–16)
LYMPHOCYTES ABSOLUTE: 2.6 K/CU MM
LYMPHOCYTES RELATIVE PERCENT: 32.1 % (ref 24–44)
MCH RBC QN AUTO: 29.2 PG (ref 27–31)
MCHC RBC AUTO-ENTMCNC: 32.5 % (ref 32–36)
MCV RBC AUTO: 89.7 FL (ref 78–100)
MONOCYTES ABSOLUTE: 0.6 K/CU MM
MONOCYTES RELATIVE PERCENT: 7.3 % (ref 0–4)
PDW BLD-RTO: 14 % (ref 11.7–14.9)
PLATELET # BLD: 337 K/CU MM (ref 140–440)
PMV BLD AUTO: 9.1 FL (ref 7.5–11.1)
POTASSIUM SERPL-SCNC: 4.1 MMOL/L (ref 3.5–5.1)
RBC # BLD: 4.39 M/CU MM (ref 4.2–5.4)
SEGMENTED NEUTROPHILS ABSOLUTE COUNT: 4.7 K/CU MM
SEGMENTED NEUTROPHILS RELATIVE PERCENT: 57.6 % (ref 36–66)
SODIUM BLD-SCNC: 139 MMOL/L (ref 135–145)
T4 FREE: 0.93 NG/DL (ref 0.9–1.8)
TOTAL PROTEIN: 6.9 GM/DL (ref 6.4–8.2)
TSH HIGH SENSITIVITY: 2.39 UIU/ML (ref 0.27–4.2)
WBC # BLD: 8.1 K/CU MM (ref 4–10.5)

## 2021-12-09 PROCEDURE — 2580000003 HC RX 258: Performed by: INTERNAL MEDICINE

## 2021-12-09 PROCEDURE — 6360000002 HC RX W HCPCS: Performed by: INTERNAL MEDICINE

## 2021-12-09 PROCEDURE — 84443 ASSAY THYROID STIM HORMONE: CPT

## 2021-12-09 PROCEDURE — 85025 COMPLETE CBC W/AUTO DIFF WBC: CPT

## 2021-12-09 PROCEDURE — 80053 COMPREHEN METABOLIC PANEL: CPT

## 2021-12-09 PROCEDURE — 36591 DRAW BLOOD OFF VENOUS DEVICE: CPT

## 2021-12-09 PROCEDURE — 84439 ASSAY OF FREE THYROXINE: CPT

## 2021-12-09 PROCEDURE — 96413 CHEMO IV INFUSION 1 HR: CPT

## 2021-12-09 RX ORDER — SODIUM CHLORIDE 0.9 % (FLUSH) 0.9 %
10 SYRINGE (ML) INJECTION PRN
Status: DISCONTINUED | OUTPATIENT
Start: 2021-12-09 | End: 2021-12-10 | Stop reason: HOSPADM

## 2021-12-09 RX ORDER — HEPARIN SODIUM (PORCINE) LOCK FLUSH IV SOLN 100 UNIT/ML 100 UNIT/ML
500 SOLUTION INTRAVENOUS PRN
Status: DISCONTINUED | OUTPATIENT
Start: 2021-12-09 | End: 2021-12-10 | Stop reason: HOSPADM

## 2021-12-09 RX ADMIN — SODIUM CHLORIDE 200 MG: 9 INJECTION, SOLUTION INTRAVENOUS at 12:19

## 2021-12-09 RX ADMIN — SODIUM CHLORIDE, PRESERVATIVE FREE 10 ML: 5 INJECTION INTRAVENOUS at 12:56

## 2021-12-09 RX ADMIN — HEPARIN 500 UNITS: 100 SYRINGE at 12:56

## 2021-12-09 NOTE — PROGRESS NOTES
Patient here for chemo. Brought to treatment room per wheelchair. Gait unsteady due to right sided weakness. Denies pain. Appetite okay. CBC and CMP done. Chemo given as ordered. RTC 12/30 for exam and chemo.

## 2021-12-27 DIAGNOSIS — C34.92 CARCINOMA OF LEFT LUNG (HCC): Primary | ICD-10-CM

## 2021-12-27 DIAGNOSIS — E03.9 HYPOTHYROIDISM, UNSPECIFIED TYPE: ICD-10-CM

## 2021-12-28 ENCOUNTER — TELEPHONE (OUTPATIENT)
Dept: INFUSION THERAPY | Age: 56
End: 2021-12-28

## 2021-12-28 NOTE — TELEPHONE ENCOUNTER
Per Maria De Jesus Oreilly at , pt's POA Víctor Winstondiamante cancelled tx + ov on 12/30 stating pt is not feeling well & she's taking pt to her PCP. They will call back to reschedule.

## 2022-06-01 ENCOUNTER — OFFICE VISIT (OUTPATIENT)
Dept: ONCOLOGY | Age: 57
End: 2022-06-01
Payer: MEDICARE

## 2022-06-01 ENCOUNTER — HOSPITAL ENCOUNTER (OUTPATIENT)
Dept: INFUSION THERAPY | Age: 57
Discharge: HOME | End: 2022-06-01
Payer: MEDICARE

## 2022-06-01 VITALS
RESPIRATION RATE: 16 BRPM | HEART RATE: 76 BPM | HEIGHT: 65 IN | WEIGHT: 159 LBS | TEMPERATURE: 96.5 F | OXYGEN SATURATION: 97 % | BODY MASS INDEX: 26.49 KG/M2 | SYSTOLIC BLOOD PRESSURE: 178 MMHG | DIASTOLIC BLOOD PRESSURE: 106 MMHG

## 2022-06-01 DIAGNOSIS — C79.52 SECONDARY MALIGNANT NEOPLASM OF BONE AND BONE MARROW (HCC): ICD-10-CM

## 2022-06-01 DIAGNOSIS — C79.51 SECONDARY MALIGNANT NEOPLASM OF BONE AND BONE MARROW (HCC): ICD-10-CM

## 2022-06-01 DIAGNOSIS — E03.9 HYPOTHYROIDISM, UNSPECIFIED TYPE: ICD-10-CM

## 2022-06-01 DIAGNOSIS — I63.9 CEREBROVASCULAR ACCIDENT (CVA), UNSPECIFIED MECHANISM (HCC): ICD-10-CM

## 2022-06-01 DIAGNOSIS — C34.92 CARCINOMA OF LEFT LUNG (HCC): ICD-10-CM

## 2022-06-01 DIAGNOSIS — C34.92 CARCINOMA OF LEFT LUNG (HCC): Primary | ICD-10-CM

## 2022-06-01 LAB
ALBUMIN SERPL-MCNC: 4.6 GM/DL (ref 3.4–5)
ALP BLD-CCNC: 83 IU/L (ref 40–129)
ALT SERPL-CCNC: 17 U/L (ref 10–40)
ANION GAP SERPL CALCULATED.3IONS-SCNC: 17 MMOL/L (ref 4–16)
AST SERPL-CCNC: 22 IU/L (ref 15–37)
BASOPHILS ABSOLUTE: 0.1 K/CU MM
BASOPHILS RELATIVE PERCENT: 0.8 % (ref 0–1)
BILIRUB SERPL-MCNC: 0.2 MG/DL (ref 0–1)
BUN BLDV-MCNC: 10 MG/DL (ref 6–23)
CALCIUM SERPL-MCNC: 9 MG/DL (ref 8.3–10.6)
CHLORIDE BLD-SCNC: 101 MMOL/L (ref 99–110)
CO2: 24 MMOL/L (ref 21–32)
CREAT SERPL-MCNC: 1 MG/DL (ref 0.6–1.1)
DIFFERENTIAL TYPE: ABNORMAL
EOSINOPHILS ABSOLUTE: 0.2 K/CU MM
EOSINOPHILS RELATIVE PERCENT: 2.2 % (ref 0–3)
GFR AFRICAN AMERICAN: >60 ML/MIN/1.73M2
GFR NON-AFRICAN AMERICAN: 57 ML/MIN/1.73M2
GLUCOSE BLD-MCNC: 93 MG/DL (ref 70–99)
HCT VFR BLD CALC: 43 % (ref 37–47)
HEMOGLOBIN: 13.9 GM/DL (ref 12.5–16)
LYMPHOCYTES ABSOLUTE: 3 K/CU MM
LYMPHOCYTES RELATIVE PERCENT: 38.9 % (ref 24–44)
MCH RBC QN AUTO: 29.5 PG (ref 27–31)
MCHC RBC AUTO-ENTMCNC: 32.3 % (ref 32–36)
MCV RBC AUTO: 91.3 FL (ref 78–100)
MONOCYTES ABSOLUTE: 0.6 K/CU MM
MONOCYTES RELATIVE PERCENT: 7.5 % (ref 0–4)
PDW BLD-RTO: 13.5 % (ref 11.7–14.9)
PLATELET # BLD: 317 K/CU MM (ref 140–440)
PMV BLD AUTO: 9.3 FL (ref 7.5–11.1)
POTASSIUM SERPL-SCNC: 3.7 MMOL/L (ref 3.5–5.1)
RBC # BLD: 4.71 M/CU MM (ref 4.2–5.4)
SEGMENTED NEUTROPHILS ABSOLUTE COUNT: 3.9 K/CU MM
SEGMENTED NEUTROPHILS RELATIVE PERCENT: 50.6 % (ref 36–66)
SODIUM BLD-SCNC: 142 MMOL/L (ref 135–145)
TOTAL PROTEIN: 7.4 GM/DL (ref 6.4–8.2)
TSH HIGH SENSITIVITY: 2.33 UIU/ML (ref 0.27–4.2)
WBC # BLD: 7.8 K/CU MM (ref 4–10.5)

## 2022-06-01 PROCEDURE — 84443 ASSAY THYROID STIM HORMONE: CPT

## 2022-06-01 PROCEDURE — 36415 COLL VENOUS BLD VENIPUNCTURE: CPT

## 2022-06-01 PROCEDURE — 80053 COMPREHEN METABOLIC PANEL: CPT

## 2022-06-01 PROCEDURE — 99214 OFFICE O/P EST MOD 30 MIN: CPT | Performed by: INTERNAL MEDICINE

## 2022-06-01 PROCEDURE — 84436 ASSAY OF TOTAL THYROXINE: CPT

## 2022-06-01 PROCEDURE — 85025 COMPLETE CBC W/AUTO DIFF WBC: CPT

## 2022-06-01 ASSESSMENT — PATIENT HEALTH QUESTIONNAIRE - PHQ9
7. TROUBLE CONCENTRATING ON THINGS, SUCH AS READING THE NEWSPAPER OR WATCHING TELEVISION: 3
9. THOUGHTS THAT YOU WOULD BE BETTER OFF DEAD, OR OF HURTING YOURSELF: 0
SUM OF ALL RESPONSES TO PHQ QUESTIONS 1-9: 13
3. TROUBLE FALLING OR STAYING ASLEEP: 3
5. POOR APPETITE OR OVEREATING: 0
1. LITTLE INTEREST OR PLEASURE IN DOING THINGS: 3
6. FEELING BAD ABOUT YOURSELF - OR THAT YOU ARE A FAILURE OR HAVE LET YOURSELF OR YOUR FAMILY DOWN: 1
8. MOVING OR SPEAKING SO SLOWLY THAT OTHER PEOPLE COULD HAVE NOTICED. OR THE OPPOSITE, BEING SO FIGETY OR RESTLESS THAT YOU HAVE BEEN MOVING AROUND A LOT MORE THAN USUAL: 0
SUM OF ALL RESPONSES TO PHQ QUESTIONS 1-9: 13
SUM OF ALL RESPONSES TO PHQ9 QUESTIONS 1 & 2: 6
10. IF YOU CHECKED OFF ANY PROBLEMS, HOW DIFFICULT HAVE THESE PROBLEMS MADE IT FOR YOU TO DO YOUR WORK, TAKE CARE OF THINGS AT HOME, OR GET ALONG WITH OTHER PEOPLE: 2
4. FEELING TIRED OR HAVING LITTLE ENERGY: 0
SUM OF ALL RESPONSES TO PHQ QUESTIONS 1-9: 13
2. FEELING DOWN, DEPRESSED OR HOPELESS: 3
SUM OF ALL RESPONSES TO PHQ QUESTIONS 1-9: 13

## 2022-06-01 NOTE — PROGRESS NOTES
Patient Name: Loreto Mcfarland  Patient : 1965  Patient MRN: [id number]     Primary Oncologist: Juliana Weaver MD  Referring Physician: Zak Estimable     Date of Service:22       Chief Complaint:   Chief Complaint   Patient presents with    Follow-up        Active Problem list  1. Carcinoma of left lung ([facility]    2. Cerebrovascular accident (CVA), unspecified mechanism ([facility]    3. Secondary malignant neoplasm of bone and bone marrow (HCC)    4. Hypothyroidism, unspecified type           HPI:   She is a very pleasant  lady [ 1965] with a 35-pack year history of smoking. She has a longstanding cough and shortness of breath related to her smoking; however, it got worse in late 2016 and she was treated as possible infectious episode, but did not improve., 2.  chest x-ray suggested pneumonia with a left upper lobe lesion 6.5 cm. CT chest done on 2016, showed a left upper lobe lung lesion measuring 6.5 by 5 cm, with extension to the first and second ribs posteriorly and bony erosion. She thus underwent CT-guided biopsy of the mass on 2016. Preliminary pathology found it to be a poorly differentiated adenocarcinoma. EGFR Negative, ALK & ROS-1 could not be done for lack of enough cells. CEA 2.2. ,  PET-CT, revealed the left upper lobe mass, which shows avid on PET-CT with extension into the left third rib, as well as to the spinous process of T3, however, felt to be resectable by Dr. Lorna Hunter. but he was not in her insurance group,  so she was referred to Dr. Xiomara Rojo in [facility], who evaluated her on  and again on 2016. Additional workup done at [facility], included MRI of the chest, which showed a large Left upper lobe apical mass invading the left T3 transverse process and partially destroying the posterior left third rib. Mass abuts the undersurface of left second rib with invasion and completely fills the left T2-3 neural foramen.   It did not invade or abut the undersurface of left brachial plexus, 5 mm away from that. Dr. Sydnie Toledo discussed the case with me after they had discussion at the Tumor Board and getting M.SHAZIA [facility] peer review, neurosurgical opinion, Anand Carranza, and it was felt that neoadjuvant chemotherapy versus chemoradiation could be considered and then surgical removal after that. The second option was to give only neoadjuvant chemotherapy and then surgery, which will be easier if the radiation was not given preop and consider postop radiation plus additional adjuvant chemotherapy, depending on the final pathology upon resection. She also had CT of the chest with contrast and CT of the thoracic spine without contrast on September 22, 2016, which confirmed the interval mild increase in the size of the large left upper lobe mass, within the medial left apex, extending into the posterior segment of left upper lobe, invading mediastinum, left posterior third rib, and left T3 transverse process, mass completely filling the T2-3 neural foramen, left hilar adenopathy, likely metastatic, and mildly enlarged subcarinal lymph node concerning for mets, a small nodule within the left upper lobe concerning for local mets. she underwent EBUS procedure with pollo biopsy, which turned out to be negative. MRI of the brain showed encephalomalacia in the left frontal temporal lobe, occlusion in the left internal carotid artery, and wallerian degeneration on the left. PET-CT August 18, 4751, showed metabolically active left upper lobe mass with local chest wall invasion and left posterior third rib and adjacent soft tissue, mildly asymmetrical activity in the left hilum, possibly could be reactive. No distant mets. Findings suggestive of left MCA distribution infarct. PFTs showed FEV1 of 2.06, DLCO/VA 82 percent.    Final stage IIIa [T4N0M0]   I agreed with the plan of neoadjuvant chemotherapy with carboplatinum and Alimta for three to four courses and depending upon the initial response after a couple of doses, we will do imaging again to see if the tumor is responding and if so, we will continue with four courses and follow it up with surgery, to be followed by adjuvant chemotherapy and radiation therapy. I preferred  not to use a taxane because of her neuropathy, for which she is currently on gabapentin, and previous stroke. After two courses of neoadjuvant chemotherapy with carbo/Alimta, she did get a CT of the chest December 7, 2016, which did show a decrease in the size of her tumor in the left upper lobe from 7.1 by 4.5 to 5.4 by 3.4 cm in size, indicating a partial response. However, she also showed evidence of some silent right lower lobe pulmonary emboli. These were not seen in the previous CT; however, that was done without contrast and not seen on the PET-CT, also, for the same reason. She is not very symptomatic and has no chest pain, shortness of breath, or significant cough. No leg or arm pain or swelling. Bilateral lower extremity ultrasounds, showed  No evidence of DVT in either lower extremity. CTA of her chest was done on January 17, 2017. No residual PE was found. The mass in the left suprahilar area now measured 8.7 by 4.3 compared to 6 by 5 cm before. Mild patchy opacity in the right lower lobe, possible atelectasis, also moderate emphysema. CT of the neck showed occluded left common carotid artery and internal carotid artery. Partially visualized left lung mass with invasion of the posterior chest wall. CT of the abdomen and pelvis showed no evidence of any metastasis in the abdomen or pelvis. There is a stable sclerotic lesion SI joint, maybe degenerative, and a questionable L3 sclerotic lesion.      PET-CT February 16, 2017, showed progression of the large paramediastinal mass at the left apex 9.7 by 5 cm versus 6.7 by 4 previously, and mets into cervical lymph nodes, retroclavicular, and left axillary lymph nodes, right tonsillar uptake. This indicated PD after 1st line Chemotherapy with Carbo/Alimta   She had a repeat biopsy of lung mass on February 13, 2017, and sent for [facility] testing which showed additional genomic alterations, including met amplification, which would make her eligible for crizotinib therapy, if she fails to pembro. She also could be a candidate for cabozantinib through a clinical trial.  PD-L1 antibodies came back positive 80% expression for pembrolizumab, which according to the NCCN guidelines, would even make her a first-line therapy for metastatic non-small cell carcinoma of the lung. EGFR was negative. ALK was also negative ROS1 also negative. Because of her high tumor mutation burden (high TMB), 32 mutations/MB, she has been a good candidate for immunotherapy with Nivolumab, pembrolizumab, or atezolizumab. Her PD-L1 expression was high, 80 percent through [facility] and 60 percent through [facility]. She was thus started on Pembro[Keytruda]on March 6, 2017, tolerating well. She also DrAline Jarquin in February after her PET/CT and because she was felt to be unresectable at that point and was asymptomatic, he did not feel immediate radiation was indicated because of lack of symptoms and it was postponed to do that unless she became symptomatic. After 4 doses of Pembro, CAT scan of the chest done on May 12, 2017, showed no evidence of pulmonary embolism. Significant reduction in the size of malignant left apical mass compared with January of 2017. Increase in the amount of mediastinal and right hilar adenopathy since the prior study, possibly reactive and indeterminate in nature. There is a 6.6 mm right middle lobe pulmonary nodule, indeterminate. Mild right lower lobe atelectasis, bullous changes in the lungs. The left apical mass invading the mediastinum, as well as posterior chest wall.     5-18  CT CAP:  negative for disease   11-18 CT CAP negative  3-19   CT CAP negative   5-19  MRI brain negative   8-19  CT abdomen negative. 8-19  CT chest negative  1/17/20: CT chest, abdomen and pelvis neg for mets   7/20/2020 CT scan of the chest abdomen pelvis showed left lower lobe scarring otherwise no evidence of metastatic disease in the chest, abdomen or pelvis. April 2021: Ct chest, abdomen and pelvis: No evidence of met disease      PAST MEDICAL HISTORY:  1. Hypertension, for which she takes no medication. 2. History of CVA in July of 2008, with right hemiparesis and speech involvement. After rehab, she is able to walk with the help of a cane. Speech is still impaired, with ongoing residual weakness. She is able to walk with a cane. 3. She is taking gabapentin for neuropathy. 4. Zoloft for depression and trazadone at night. 5. For high cholesterol she takes simvastatin. 6. History of irritable bowel syndrome. 7. Fibromyalgia., 8. platelet count had jumped up to more than a million in Nov 2016. Because of her previous history of stroke, I did check for JAK2 gene mutation to make sure she did not have underlying polycythemia or a myeloproliferative process in addition. JAK2 came back negative,  9. In Jan 2017,hemoglobin slowly dropped from 10 to 8.4 and a couple of days ago her hemoglobin was felt to be dropping to 6.3 from the hospital lab, with hematocrit dropping from 27 to 20,  10. Mammogram May 16, 2017, showed category 1, negative    PAST SURGICAL HISTORY:  1. Tubal ligation. 2. Appendectomy. 3. Tonsillectomy. 4. Carpal tunnel release. 5. D&C. FAMILY HISTORY:  Paternal grandmother and paternal aunt had breast cancer, maternal grandmother had uterine cancer. She has two sisters and one brother Laurel Gutierrez. PERSONAL HISTORY:  She has a 35-pack year history of smoking, moderate to heavy drinking in the past, but quit 20 years ago. She has been  for 25 years. 3 children.  She has one daughter, Wellington La, and two sons Tripp &.      Current Therapy  Pembrolizumab D1 Q21D (HNCC, NSCLC)X12 Cycle Length: 21 Number Cycles: 47 Start: Fadi Huggins on 2017 Assoc Dx: Non-small cell lung cancer (disorder) LOT: 2nd Line Metastatic 2017 C10 D1  Pembrolizumab IV, 200 mg  OHC Hydration Cycle Length: 1 Number Cycles: 1 Start: Fadi Huggins on 2018 Assoc Dx: Dehydration LOT: Other 2018 C1 D1  Sodium Chloride IV 0.9 %, .9 % 1000 mL, Dose modified    Interval History  22: she arrived with her sister to the clinic today. Reported that she had mechanical fall 6 weeks ago twice. Depressed more lately. No chest pain, increased sob. Lost 5 lbs. Appetite is preserved. No abdominal pain. No bleeding.       Review of Systems  Per interval history; otherwise 10 point ROS is negative      Vital Signs:  BP (!) 178/106 (Site: Left Upper Arm, Position: Sitting, Cuff Size: Medium Adult)   Pulse 76   Temp (!) 96.5 °F (35.8 °C) (Infrared)   Resp 16   Ht 5' 5\" (1.651 m)   Wt 159 lb (72.1 kg)   SpO2 97%   BMI 26.46 kg/m²     Physical Exam:    CONSTITUTIONAL:aao x 3,walks with cane, aphasia  EYES:No palor or any icterus  ENT: ATNC  NECK: No JVD  HEMATOLOGIC/LYMPHATIC: no cervical, supraclavicular or axillary lymphadenopathy   LUNGS: CTAB  CARDIOVASCULAR: s1s2 rrr no murmurs  ABDOMEN: soft nd nt bs pos  NEUROLOGIC: left hemiparesis  SKIN: Healed scar  EXTREMITIES: No LE edema    Labs:    Hematology:  Lab Results   Component Value Date    WBC 8.1 2021    RBC 4.39 2021    HGB 12.8 2021    HCT 39.4 2021    MCV 89.7 2021    MCH 29.2 2021    MCHC 32.5 2021    RDW 14.0 2021     2021    MPV 9.1 2021    BANDSPCT 1 (L) 2017    SEGSPCT 57.6 2021    EOSRELPCT 2.6 2021    BASOPCT 0.4 2021    LYMPHOPCT 32.1 2021    MONOPCT 7.3 (H) 2021    BANDABS 0.05 2017    SEGSABS 4.7 2021    EOSABS 0.2 2021    BASOSABS 0.0 2021    LYMPHSABS 2.6 12/09/2021    MONOSABS 0.6 12/09/2021    DIFFTYPE AUTOMATED DIFFERENTIAL 12/09/2021    POLYCHROM 1+ 01/18/2017    PLTM PLATELETS APPEAR INCREASED 01/18/2017     No results found for: ESR    Chemistry:  Lab Results   Component Value Date     12/09/2021    K 4.1 12/09/2021     12/09/2021    CO2 22 12/09/2021    BUN 14 12/09/2021    CREATININE 0.9 12/09/2021    GLUCOSE 101 (H) 12/09/2021    CALCIUM 8.0 (L) 12/09/2021    PROT 6.9 12/09/2021    LABALBU 4.3 12/09/2021    BILITOT 0.2 12/09/2021    ALKPHOS 95 12/09/2021    AST 18 12/09/2021    ALT 15 12/09/2021    LABGLOM >60 12/09/2021    GFRAA >60 12/09/2021    PHOS 3.0 02/17/2019    MG 2.2 02/18/2019     Lab Results   Component Value Date     08/13/2020     No components found for: LD  Lab Results   Component Value Date    TSHHS 2.390 12/09/2021    T4FREE 0.93 12/09/2021    FT3 2.4 06/20/2017       Immunology:  Lab Results   Component Value Date    PROT 6.9 12/09/2021     No results found for: Marleen Bullocks, KLFLCR  No results found for: B2M    Coagulation Panel:  Lab Results   Component Value Date    PROTIME 15.7 02/13/2017    INR 1.9 (H) 08/22/2017    APTT 32.7 02/13/2017    DDIMER 962 (H) 12/07/2016       Anemia Panel:  Lab Results   Component Value Date    KNFOGLLZ88 4013 (H) 01/18/2017    FOLATE >20.0 (H) 01/18/2017       Tumor Markers:  Lab Results   Component Value Date    CEA 1.7 04/12/2017         Imaging: Reviewed     Pathology:Reviewed     Observations:  Performance Status: ECOG 2  Depression Status: PHQ 9 Positive. Is on zoloft and follows with PCP. Deferred no further intervention        Assessment & Plan:  1. 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     A repeat biopsy and [facility] results showed complex karyotype, as above, with multiple mutations plus high PD-L1 expression. Second-line treatment with immunotherapy, pembrolizumab started in March 2017. She seems to be responding very well to that with good initial response. Based on the PET-CT results, it looks like she has shown an excellent response to her pembrolizumab therapy  She has shown near complete remission with the left apical mass decreasing from 9.5 to 4 cm, but SUV dropped from 29 to 3.5. No other evidence of disease elsewhere. We discussed her case in the Tumor Board, also, and it seems that she is not a candidate for surgery as determined in February of 2017. Her PD-L1 expression was 80 percent  She continues to be on pembrolizumab with the continued excellent treatment response. Plan to continue pembrolizumab until adverse effects of progressive disease. We will continue to repeat CT scan of the chest every 6 to 12 months. Last scan 7/2020 as above with no recurrent disease  -no immune mediated or grade three toxicities  - Repeat CT chest done April 2021 with no disease progression.   -Plan to continue [address] [place] [address] Drive to repeat Ct chest, abdomen and pelvis in 6M in October 2021     Mechanical falls: look into layout and may need to make changes. Pulmonary emboli:  She was found to have silent PE on initial evaluation on CAT scan and thus had been on Coumadin for more than a year.  she had some bloody stools and bruising which resolved when Coumadin was discontinued    depression  -worsened due to situational issues(granddaughter molested)  perpertrator incarcerated and Zoloft was increased to 150  -since dosage increase patient has been staying in her room more, but more agitated/upset  -not sure if this is more psych related vs  did the increase in dose of her zoloft lead to her weight gain which has  led her to stay in her room more  -Note that she is currently on 100mg Zoloft  -More depressed lately, discussed increasing the the left internal carotid artery, and wallerian degeneration on the left. PET-CT August 18, 6997, showed metabolically active left upper lobe mass with local chest wall invasion and left posterior third rib and adjacent soft tissue, mildly asymmetrical activity in the left hilum, possibly could be reactive. No distant mets. Findings suggestive of left MCA distribution infarct. PFTs showed FEV1 of 2.06, DLCO/VA 82 percent. Final stage IIIa [T4N0M0]   I agreed with the plan of neoadjuvant chemotherapy with carboplatinum and Alimta for three to four courses and depending upon the initial response after a couple of doses, we will do imaging again to see if the tumor is responding and if so, we will continue with four courses and follow it up with surgery, to be followed by adjuvant chemotherapy and radiation therapy. I preferred  not to use a taxane because of her neuropathy, for which she is currently on gabapentin, and previous stroke. After two courses of neoadjuvant chemotherapy with carbo/Alimta, she did get a CT of the chest December 7, 2016, which did show a decrease in the size of her tumor in the left upper lobe from 7.1 by 4.5 to 5.4 by 3.4 cm in size, indicating a partial response. However, she also showed evidence of some silent right lower lobe pulmonary emboli. These were not seen in the previous CT; however, that was done without contrast and not seen on the PET-CT, also, for the same reason. She is not very symptomatic and has no chest pain, shortness of breath, or significant cough. No leg or arm pain or swelling. Bilateral lower extremity ultrasounds, showed  No evidence of DVT in either lower extremity. CTA of her chest was done on January 17, 2017. No residual PE was found. The mass in the left suprahilar area now measured 8.7 by 4.3 compared to 6 by 5 cm before. Mild patchy opacity in the right lower lobe, possible atelectasis, also moderate emphysema.    CT of the neck showed occluded left common carotid artery and internal carotid artery. Partially visualized left lung mass with invasion of the posterior chest wall. CT of the abdomen and pelvis showed no evidence of any metastasis in the abdomen or pelvis. There is a stable sclerotic lesion SI joint, maybe degenerative, and a questionable L3 sclerotic lesion. PET-CT February 16, 2017, showed progression of the large paramediastinal mass at the left apex 9.7 by 5 cm versus 6.7 by 4 previously, and mets into cervical lymph nodes, retroclavicular, and left axillary lymph nodes, right tonsillar uptake. This indicated PD after 1st line Chemotherapy with Carbo/Alimta   She had a repeat biopsy of lung mass on February 13, 2017, and sent for [facility] testing which showed additional genomic alterations, including met amplification, which would make her eligible for crizotinib therapy, if she fails to pembro. She also could be a candidate for cabozantinib through a clinical trial.  PD-L1 antibodies came back positive 80% expression for pembrolizumab, which according to the NCCN guidelines, would even make her a first-line therapy for metastatic non-small cell carcinoma of the lung. EGFR was negative. ALK was also negative ROS1 also negative. Because of her high tumor mutation burden (high TMB), 32 mutations/MB, she has been a good candidate for immunotherapy with Nivolumab, pembrolizumab, or atezolizumab. Her PD-L1 expression was high, 80 percent through [facility] and 60 percent through [facility]. She was thus started on Pembro[Keytruda]on March 6, 2017, tolerating well. She also Dr. Ez Sousa in February after her PET/CT and because she was felt to be unresectable at that point and was asymptomatic, he did not feel immediate radiation was indicated because of lack of symptoms and it was postponed to do that unless she became symptomatic.   After 4 doses of Pembro, CAT scan of the chest done on May 12, 2017, showed no evidence of pulmonary embolism. Significant reduction in the size of malignant left apical mass compared with January of 2017. Increase in the amount of mediastinal and right hilar adenopathy since the prior study, possibly reactive and indeterminate in nature. There is a 6.6 mm right middle lobe pulmonary nodule, indeterminate. Mild right lower lobe atelectasis, bullous changes in the lungs. The left apical mass invading the mediastinum, as well as posterior chest wall. 5-18  CT CAP:  negative for disease   11-18 CT CAP negative  3-19   CT CAP negative   5-19  MRI brain negative   8-19  CT abdomen negative. 8-19  CT chest negative  1/17/20: CT chest, abdomen and pelvis neg for mets   7/20/2020 CT scan of the chest abdomen pelvis showed left lower lobe scarring otherwise no evidence of metastatic disease in the chest, abdomen or pelvis. April 2021: Ct chest, abdomen and pelvis: No evidence of met disease      PAST MEDICAL HISTORY:  1. Hypertension, for which she takes no medication. 2. History of CVA in July of 2008, with right hemiparesis and speech involvement. After rehab, she is able to walk with the help of a cane. Speech is still impaired, with ongoing residual weakness. She is able to walk with a cane. 3. She is taking gabapentin for neuropathy. 4. Zoloft for depression and trazadone at night. 5. For high cholesterol she takes simvastatin. 6. History of irritable bowel syndrome. 7. Fibromyalgia., 8. platelet count had jumped up to more than a million in Nov 2016. Because of her previous history of stroke, I did check for JAK2 gene mutation to make sure she did not have underlying polycythemia or a myeloproliferative process in addition. JAK2 came back negative,  9. In Jan 2017,hemoglobin slowly dropped from 10 to 8.4 and a couple of days ago her hemoglobin was felt to be dropping to 6.3 from the hospital lab, with hematocrit dropping from 27 to 20,  10.  Mammogram May 16, 2017, showed category 1, negative    PAST SURGICAL HISTORY:  1. Tubal ligation. 2. Appendectomy. 3. Tonsillectomy. 4. Carpal tunnel release. 5. D&C. FAMILY HISTORY:  Paternal grandmother and paternal aunt had breast cancer, maternal grandmother had uterine cancer. She has two sisters and one brother Laurel Gutierrez. PERSONAL HISTORY:  She has a 35-pack year history of smoking, moderate to heavy drinking in the past, but quit 20 years ago. She has been  for 25 years. 3 children. She has one daughter, Esa, and two sons Ziyad Reynolds &.      Current Therapy  Pembrolizumab D1 Q21D (HNCC, NSCLC)X12 Cycle Length: 21 Number Cycles: 47 Start: Jenni Baig on 2017 Assoc Dx: Non-small cell lung cancer (disorder) LOT: 2nd Line Metastatic 2017 C10 D1  Pembrolizumab IV, 200 mg  OHC Hydration Cycle Length: 1 Number Cycles: 1 Start: Jenni Baig on 2018 Assoc Dx: Dehydration LOT: Other 2018 C1 D1  Sodium Chloride IV 0.9 %, .9 % 1000 mL, Dose modified    Interval History  22: she arrived with her sister to the clinic today. Sister reported that she was very depressed for the last several months and did not come for treatment. Her PCP is going to change the antidepressants. No chest pain, increased sob, fever. No cough. Seasonal allergies. No weight loss. No bleeding.       Review of Systems  Per interval history; otherwise 10 point ROS is negative      Vital Signs:  BP (!) 178/106 (Site: Left Upper Arm, Position: Sitting, Cuff Size: Medium Adult)   Pulse 76   Temp (!) 96.5 °F (35.8 °C) (Infrared)   Resp 16   Ht 5' 5\" (1.651 m)   Wt 159 lb (72.1 kg)   SpO2 97%   BMI 26.46 kg/m²     Physical Exam:    CONSTITUTIONAL:aao x 3,wheelchair  EYES:No palor or any icterus  ENT: ATNC  NECK: No JVD  HEMATOLOGIC/LYMPHATIC: no cervical, supraclavicular or axillary lymphadenopathy   LUNGS: CTAB  CARDIOVASCULAR: s1s2 rrr no murmurs  ABDOMEN: soft nd nt bs pos  NEUROLOGIC: left hemiparesis  SKIN: Healed scar  EXTREMITIES: No LE edema    Labs:    Hematology:  Lab Results   Component Value Date    WBC 8.1 12/09/2021    RBC 4.39 12/09/2021    HGB 12.8 12/09/2021    HCT 39.4 12/09/2021    MCV 89.7 12/09/2021    MCH 29.2 12/09/2021    MCHC 32.5 12/09/2021    RDW 14.0 12/09/2021     12/09/2021    MPV 9.1 12/09/2021    BANDSPCT 1 (L) 01/16/2017    SEGSPCT 57.6 12/09/2021    EOSRELPCT 2.6 12/09/2021    BASOPCT 0.4 12/09/2021    LYMPHOPCT 32.1 12/09/2021    MONOPCT 7.3 (H) 12/09/2021    BANDABS 0.05 01/16/2017    SEGSABS 4.7 12/09/2021    EOSABS 0.2 12/09/2021    BASOSABS 0.0 12/09/2021    LYMPHSABS 2.6 12/09/2021    MONOSABS 0.6 12/09/2021    DIFFTYPE AUTOMATED DIFFERENTIAL 12/09/2021    POLYCHROM 1+ 01/18/2017    PLTM PLATELETS APPEAR INCREASED 01/18/2017     No results found for: ESR    Chemistry:  Lab Results   Component Value Date     12/09/2021    K 4.1 12/09/2021     12/09/2021    CO2 22 12/09/2021    BUN 14 12/09/2021    CREATININE 0.9 12/09/2021    GLUCOSE 101 (H) 12/09/2021    CALCIUM 8.0 (L) 12/09/2021    PROT 6.9 12/09/2021    LABALBU 4.3 12/09/2021    BILITOT 0.2 12/09/2021    ALKPHOS 95 12/09/2021    AST 18 12/09/2021    ALT 15 12/09/2021    LABGLOM >60 12/09/2021    GFRAA >60 12/09/2021    PHOS 3.0 02/17/2019    MG 2.2 02/18/2019     Lab Results   Component Value Date     08/13/2020     No components found for: LD  Lab Results   Component Value Date    TSHHS 2.390 12/09/2021    T4FREE 0.93 12/09/2021    FT3 2.4 06/20/2017       Immunology:  Lab Results   Component Value Date    PROT 6.9 12/09/2021     No results found for: GALO Edwards  No results found for: B2M    Coagulation Panel:  Lab Results   Component Value Date    PROTIME 15.7 02/13/2017    INR 1.9 (H) 08/22/2017    APTT 32.7 02/13/2017    DDIMER 962 (H) 12/07/2016       Anemia Panel:  Lab Results   Component Value Date    VPUMDCFS37 1322 (H) 01/18/2017    FOLATE >20.0 (H) 01/18/2017       Tumor Markers:  Lab Results   Component Value Date    CEA 1.7 04/12/2017         Imaging: Reviewed     Pathology:Reviewed     Observations:  Performance Status: ECOG 2  Depression Status: PHQ 9 Positive. Is on zoloft and follows with PCP. Deferred no further intervention        Assessment & Plan:  1. 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 A repeat biopsy and [facility] results showed complex karyotype, as above, with multiple mutations plus high PD-L1 expression. Second-line treatment with immunotherapy, pembrolizumab started in March 2017. She seems to be responding very well to that with good initial response. Based on the PET-CT results, it looks like she has shown an excellent response to her pembrolizumab therapy  She has shown near complete remission with the left apical mass decreasing from 9.5 to 4 cm, but SUV dropped from 29 to 3.5. No other evidence of disease elsewhere. We discussed her case in the Tumor Board, also, and it seems that she is not a candidate for surgery as determined in February of 2017. Her PD-L1 expression was 80 percent  She continues to be on pembrolizumab with the continued excellent treatment response. Plan to continue pembrolizumab until adverse effects of progressive disease. We will continue to repeat CT scan of the chest every 6 to 12 months. Last scan 7/2020 as above with no recurrent disease  -no immune mediated or grade three toxicities  - Repeat CT chest done April 2021 with no disease progression.   -Plan to continue [address] [place] [address] Drive was  to repeat Ct chest, abdomen and pelvis in 6M in October 2021 but she stopped treatment in dec 2021 and did not follow until may 2022     THN: asked her sister to maintain a log and discuss with PCP.  Low salt diet and weight loss.     Pulmonary emboli:  She was found to have silent PE on initial evaluation on CAT scan and thus had been on Coumadin for more than a year. she had some bloody stools and bruising which resolved when Coumadin was discontinued    Depression  Is being followed by PCP. Continue other medical care    Discussed the above findings and plan with her and she seems to have verbalized understanding    Recommend excercise as tolerated, healthy diet and meal plan. Also discussed the importance of being uptodate with age appropriate screening tools including mammogram, pap smear    Recommend follow up with PCP and other specialists. Answered all question    RTC august 2022 or earlier if new Sx. I have recommended that the patient follow CDC guidelines for prevention of COVID-19 infection.     [id number]

## 2022-06-01 NOTE — PROGRESS NOTES
MA Rooming Questions  Patient: Maria De Jesus Dee  MRN: [id number]    Date: 6/1/2022        1. Do you have any new issues?   no         2. Do you need any refills on medications?    no    3. Have you had any imaging done since your last visit?   no    4. Have you been hospitalized or seen in the emergency room since your last visit here?   no    5. Did the patient have a depression screening completed today?  Yes    No data recorded     PHQ-9 Given to (if applicable):               PHQ-9 Score (if applicable):                     [] Positive     [x]  Negative              Does question #9 need addressed (if applicable)                     [] Yes    []  No               Nisha Manzano MA

## 2022-06-03 LAB — T4 TOTAL: 9.56 UG/DL (ref 4.5–11.7)

## 2022-07-19 ENCOUNTER — TELEPHONE (OUTPATIENT)
Dept: ONCOLOGY | Age: 57
End: 2022-07-19

## 2022-07-19 NOTE — TELEPHONE ENCOUNTER
LM INFORMING PATIENT OF CT ABD/PEL W IV CONTRAST AT [facility] ON 7/25/22  AT 9AM. NPO 4H.   PATIENT INFORMED TO CALL [phone] IF ANY QUESTIONS OR CONCERNS ARISE

## 2022-07-29 ENCOUNTER — TELEPHONE (OUTPATIENT)
Dept: ONCOLOGY | Age: 57
End: 2022-07-29

## 2022-07-29 DIAGNOSIS — C34.92 CARCINOMA OF LEFT LUNG (HCC): Primary | ICD-10-CM

## 2022-08-01 ENCOUNTER — CLINICAL DOCUMENTATION (OUTPATIENT)
Dept: ONCOLOGY | Age: 57
End: 2022-08-01

## 2022-08-01 NOTE — PROGRESS NOTES
[facility] @ [facility] called for order for CT Chest/Abd & Pelvis to be faxed @ [phone], faxed and confirm rec'd
hard copy, drawn during this pregnancy

## 2022-08-05 ENCOUNTER — HOSPITAL ENCOUNTER (OUTPATIENT)
Dept: INFUSION THERAPY | Age: 57
End: 2022-08-05

## 2022-08-05 ENCOUNTER — OFFICE VISIT (OUTPATIENT)
Dept: ONCOLOGY | Age: 57
End: 2022-08-05
Payer: MEDICARE

## 2022-08-05 VITALS
DIASTOLIC BLOOD PRESSURE: 75 MMHG | RESPIRATION RATE: 16 BRPM | WEIGHT: 159 LBS | HEIGHT: 65 IN | SYSTOLIC BLOOD PRESSURE: 125 MMHG | BODY MASS INDEX: 26.49 KG/M2 | OXYGEN SATURATION: 99 % | TEMPERATURE: 97.7 F | HEART RATE: 101 BPM

## 2022-08-05 DIAGNOSIS — I63.9 CEREBROVASCULAR ACCIDENT (CVA), UNSPECIFIED MECHANISM (HCC): ICD-10-CM

## 2022-08-05 DIAGNOSIS — C34.92 CARCINOMA OF LEFT LUNG (HCC): Primary | ICD-10-CM

## 2022-08-05 DIAGNOSIS — C79.51 SECONDARY MALIGNANT NEOPLASM OF BONE AND BONE MARROW (HCC): ICD-10-CM

## 2022-08-05 DIAGNOSIS — C79.52 SECONDARY MALIGNANT NEOPLASM OF BONE AND BONE MARROW (HCC): ICD-10-CM

## 2022-08-05 PROCEDURE — 99214 OFFICE O/P EST MOD 30 MIN: CPT | Performed by: INTERNAL MEDICINE

## 2022-08-05 ASSESSMENT — PATIENT HEALTH QUESTIONNAIRE - PHQ9
6. FEELING BAD ABOUT YOURSELF - OR THAT YOU ARE A FAILURE OR HAVE LET YOURSELF OR YOUR FAMILY DOWN: 0
SUM OF ALL RESPONSES TO PHQ QUESTIONS 1-9: 0
4. FEELING TIRED OR HAVING LITTLE ENERGY: 0
SUM OF ALL RESPONSES TO PHQ QUESTIONS 1-9: 0
5. POOR APPETITE OR OVEREATING: 0
10. IF YOU CHECKED OFF ANY PROBLEMS, HOW DIFFICULT HAVE THESE PROBLEMS MADE IT FOR YOU TO DO YOUR WORK, TAKE CARE OF THINGS AT HOME, OR GET ALONG WITH OTHER PEOPLE: 0
SUM OF ALL RESPONSES TO PHQ QUESTIONS 1-9: 0
SUM OF ALL RESPONSES TO PHQ9 QUESTIONS 1 & 2: 0
3. TROUBLE FALLING OR STAYING ASLEEP: 0
2. FEELING DOWN, DEPRESSED OR HOPELESS: 0
SUM OF ALL RESPONSES TO PHQ QUESTIONS 1-9: 0
7. TROUBLE CONCENTRATING ON THINGS, SUCH AS READING THE NEWSPAPER OR WATCHING TELEVISION: 0
9. THOUGHTS THAT YOU WOULD BE BETTER OFF DEAD, OR OF HURTING YOURSELF: 0
1. LITTLE INTEREST OR PLEASURE IN DOING THINGS: 0
8. MOVING OR SPEAKING SO SLOWLY THAT OTHER PEOPLE COULD HAVE NOTICED. OR THE OPPOSITE, BEING SO FIGETY OR RESTLESS THAT YOU HAVE BEEN MOVING AROUND A LOT MORE THAN USUAL: 0

## 2022-08-05 NOTE — PROGRESS NOTES
MA Rooming Questions  Patient: Brooks Marinelli  MRN: [id number]    Date: 8/5/2022        1. Do you have any new issues? yes - Seasonal allergies          2. Do you need any refills on medications?    no    3. Have you had any imaging done since your last visit? yes - CT    4. Have you been hospitalized or seen in the emergency room since your last visit here?   no    5. Did the patient have a depression screening completed today?  Yes    PHQ-9 Total Score: 0 (8/5/2022  2:52 PM)  Thoughts that you would be better off dead, or of hurting yourself in some way: Not at all (8/5/2022  2:52 PM)       PHQ-9 Given to (if applicable):               PHQ-9 Score (if applicable):                     [] Positive     []  Negative              Does question #9 need addressed (if applicable)                     [] Yes    []  No               Asiya Hawthorne, CMA

## 2022-08-05 NOTE — PROGRESS NOTES
Patient Name: Darell Goltz  Patient : 1965  Patient MRN: [id number]     Primary Oncologist: Angeles Lopez MD  Referring Physician: Brittni Chun     Date of Service:22       Chief Complaint:   Chief Complaint   Patient presents with    Discuss Labs        Active Problem list  1. Carcinoma of left lung ([address]    2. Secondary malignant neoplasm of bone and bone marrow ([address]    3. Cerebrovascular accident (CVA), unspecified mechanism ([address]           HPI:   She is a very pleasant  lady [ 1965] with a 35-pack year history of smoking. She has a longstanding cough and shortness of breath related to her smoking; however, it got worse in late 2016 and she was treated as possible infectious episode, but did not improve., 2.  chest x-ray suggested pneumonia with a left upper lobe lesion 6.5 cm. CT chest done on 2016, showed a left upper lobe lung lesion measuring 6.5 by 5 cm, with extension to the first and second ribs posteriorly and bony erosion. She thus underwent CT-guided biopsy of the mass on 2016. Preliminary pathology found it to be a poorly differentiated adenocarcinoma. EGFR Negative, ALK & ROS-1 could not be done for lack of enough cells. CEA 2.2. ,  PET-CT, revealed the left upper lobe mass, which shows avid on PET-CT with extension into the left third rib, as well as to the spinous process of T3, however, felt to be resectable by Dr. Mignon Cardona. but he was not in her insurance group,  so she was referred to Dr. David Monsivais in [facility], who evaluated her on  and again on 2016. Additional workup done at [facility], included MRI of the chest, which showed a large Left upper lobe apical mass invading the left T3 transverse process and partially destroying the posterior left third rib. Mass abuts the undersurface of left second rib with invasion and completely fills the left T2-3 neural foramen.   It did not invade or abut the undersurface of left brachial plexus, 5 mm away from that. Dr. Awilda Billy discussed the case with me after they had discussion at the Tumor Board and getting YOLA Pickett peer review, neurosurgical opinion, Amalia Sloan, and it was felt that neoadjuvant chemotherapy versus chemoradiation could be considered and then surgical removal after that. The second option was to give only neoadjuvant chemotherapy and then surgery, which will be easier if the radiation was not given preop and consider postop radiation plus additional adjuvant chemotherapy, depending on the final pathology upon resection. She also had CT of the chest with contrast and CT of the thoracic spine without contrast on September 22, 2016, which confirmed the interval mild increase in the size of the large left upper lobe mass, within the medial left apex, extending into the posterior segment of left upper lobe, invading mediastinum, left posterior third rib, and left T3 transverse process, mass completely filling the T2-3 neural foramen, left hilar adenopathy, likely metastatic, and mildly enlarged subcarinal lymph node concerning for mets, a small nodule within the left upper lobe concerning for local mets. she underwent EBUS procedure with pollo biopsy, which turned out to be negative. MRI of the brain showed encephalomalacia in the left frontal temporal lobe, occlusion in the left internal carotid artery, and wallerian degeneration on the left. PET-CT August 18, 3541, showed metabolically active left upper lobe mass with local chest wall invasion and left posterior third rib and adjacent soft tissue, mildly asymmetrical activity in the left hilum, possibly could be reactive. No distant mets. Findings suggestive of left MCA distribution infarct. PFTs showed FEV1 of 2.06, DLCO/VA 82 percent.    Final stage IIIa [T4N0M0]   I agreed with the plan of neoadjuvant chemotherapy with carboplatinum and Alimta for three to four courses and depending upon the initial response after a couple of doses, we will do imaging again to see if the tumor is responding and if so, we will continue with four courses and follow it up with surgery, to be followed by adjuvant chemotherapy and radiation therapy. I preferred  not to use a taxane because of her neuropathy, for which she is currently on gabapentin, and previous stroke. After two courses of neoadjuvant chemotherapy with carbo/Alimta, she did get a CT of the chest December 7, 2016, which did show a decrease in the size of her tumor in the left upper lobe from 7.1 by 4.5 to 5.4 by 3.4 cm in size, indicating a partial response. However, she also showed evidence of some silent right lower lobe pulmonary emboli. These were not seen in the previous CT; however, that was done without contrast and not seen on the PET-CT, also, for the same reason. She is not very symptomatic and has no chest pain, shortness of breath, or significant cough. No leg or arm pain or swelling. Bilateral lower extremity ultrasounds, showed  No evidence of DVT in either lower extremity. CTA of her chest was done on January 17, 2017. No residual PE was found. The mass in the left suprahilar area now measured 8.7 by 4.3 compared to 6 by 5 cm before. Mild patchy opacity in the right lower lobe, possible atelectasis, also moderate emphysema. CT of the neck showed occluded left common carotid artery and internal carotid artery. Partially visualized left lung mass with invasion of the posterior chest wall. CT of the abdomen and pelvis showed no evidence of any metastasis in the abdomen or pelvis. There is a stable sclerotic lesion SI joint, maybe degenerative, and a questionable L3 sclerotic lesion.      PET-CT February 16, 2017, showed progression of the large paramediastinal mass at the left apex 9.7 by 5 cm versus 6.7 by 4 previously, and mets into cervical lymph nodes, retroclavicular, and left axillary lymph nodes, right tonsillar uptake. This indicated PD after 1st line Chemotherapy with Carbo/Alimta   She had a repeat biopsy of lung mass on February 13, 2017, and sent for [facility] testing which showed additional genomic alterations, including met amplification, which would make her eligible for crizotinib therapy, if she fails to pembro. She also could be a candidate for cabozantinib through a clinical trial.  PD-L1 antibodies came back positive 80% expression for pembrolizumab, which according to the NCCN guidelines, would even make her a first-line therapy for metastatic non-small cell carcinoma of the lung. EGFR was negative. ALK was also negative ROS1 also negative. Because of her high tumor mutation burden (high TMB), 32 mutations/MB, she has been a good candidate for immunotherapy with Nivolumab, pembrolizumab, or atezolizumab. Her PD-L1 expression was high, 80 percent through [facility] and 60 percent through [facility]. She was thus started on Pembro[Keytruda]on March 6, 2017, tolerating well. She also Dr. Promise Maddox in February after her PET/CT and because she was felt to be unresectable at that point and was asymptomatic, he did not feel immediate radiation was indicated because of lack of symptoms and it was postponed to do that unless she became symptomatic. After 4 doses of Pembro, CAT scan of the chest done on May 12, 2017, showed no evidence of pulmonary embolism. Significant reduction in the size of malignant left apical mass compared with January of 2017. Increase in the amount of mediastinal and right hilar adenopathy since the prior study, possibly reactive and indeterminate in nature. There is a 6.6 mm right middle lobe pulmonary nodule, indeterminate. Mild right lower lobe atelectasis, bullous changes in the lungs. The left apical mass invading the mediastinum, as well as posterior chest wall.     5-18  CT CAP:  negative for disease   11-18 CT CAP negative  3-19   CT CAP negative   5-19 MRI brain negative   8-19  CT abdomen negative. 8-19  CT chest negative  1/17/20: CT chest, abdomen and pelvis neg for mets   7/20/2020 CT scan of the chest abdomen pelvis showed left lower lobe scarring otherwise no evidence of metastatic disease in the chest, abdomen or pelvis. April 2021: Ct chest, abdomen and pelvis: No evidence of met disease      PAST MEDICAL HISTORY:  1. Hypertension, for which she takes no medication. 2. History of CVA in July of 2008, with right hemiparesis and speech involvement. After rehab, she is able to walk with the help of a cane. Speech is still impaired, with ongoing residual weakness. She is able to walk with a cane. 3. She is taking gabapentin for neuropathy. 4. Zoloft for depression and trazadone at night. 5. For high cholesterol she takes simvastatin. 6. History of irritable bowel syndrome. 7. Fibromyalgia., 8. platelet count had jumped up to more than a million in Nov 2016. Because of her previous history of stroke, I did check for JAK2 gene mutation to make sure she did not have underlying polycythemia or a myeloproliferative process in addition. JAK2 came back negative,  9. In Jan 2017,hemoglobin slowly dropped from 10 to 8.4 and a couple of days ago her hemoglobin was felt to be dropping to 6.3 from the hospital lab, with hematocrit dropping from 27 to 20,  10. Mammogram May 16, 2017, showed category 1, negative    PAST SURGICAL HISTORY:  1. Tubal ligation. 2. Appendectomy. 3. Tonsillectomy. 4. Carpal tunnel release. 5. D&C. FAMILY HISTORY:  Paternal grandmother and paternal aunt had breast cancer, maternal grandmother had uterine cancer. She has two sisters and one brother Santana Swartz. PERSONAL HISTORY:  She has a 35-pack year history of smoking, moderate to heavy drinking in the past, but quit 20 years ago. She has been  for 25 years. 3 children.  She has one daughter, Lexie Miller, and two sons Debbie Joseph &.      Current Therapy  Pembrolizumab D1 Q21D (HNCC, NSCLC)X12 Cycle Length: 21 Number Cycles: 47 Start: Casie Grew on 2017 Assoc Dx: Non-small cell lung cancer (disorder) LOT: 2nd Line Metastatic 2017 C10 D1  Pembrolizumab IV, 200 mg  OHC Hydration Cycle Length: 1 Number Cycles: 1 Start: Casie Grew on 2018 Assoc Dx: Dehydration LOT: Other 2018 C1 D1  Sodium Chloride IV 0.9 %, .9 % 1000 mL, Dose modified    Interval History  22: she arrived with her sister to the clinic today. Reported that she had mechanical fall 6 weeks ago twice. Depressed more lately. No chest pain, increased sob. Lost 5 lbs. Appetite is preserved. No abdominal pain. No bleeding.       Review of Systems  Per interval history; otherwise 10 point ROS is negative      Vital Signs:  /75 (Site: Left Upper Arm, Position: Sitting, Cuff Size: Medium Adult)   Pulse (!) 101   Temp 97.7 °F (36.5 °C) (Infrared)   Resp 16   Ht 5' 5\" (1.651 m)   Wt 159 lb (72.1 kg)   SpO2 99%   BMI 26.46 kg/m²     Physical Exam:    CONSTITUTIONAL:aao x 3,walks with cane, aphasia  EYES:No palor or any icterus  ENT: ATNC  NECK: No JVD  HEMATOLOGIC/LYMPHATIC: no cervical, supraclavicular or axillary lymphadenopathy   LUNGS: CTAB  CARDIOVASCULAR: s1s2 rrr no murmurs  ABDOMEN: soft nd nt bs pos  NEUROLOGIC: left hemiparesis  SKIN: Healed scar  EXTREMITIES: No LE edema    Labs:    Hematology:  Lab Results   Component Value Date    WBC 7.8 2022    RBC 4.71 2022    HGB 13.9 2022    HCT 43.0 2022    MCV 91.3 2022    MCH 29.5 2022    MCHC 32.3 2022    RDW 13.5 2022     2022    MPV 9.3 2022    BANDSPCT 1 (L) 2017    SEGSPCT 50.6 2022    EOSRELPCT 2.2 2022    BASOPCT 0.8 2022    LYMPHOPCT 38.9 2022    MONOPCT 7.5 (H) 2022    BANDABS 0.05 2017    SEGSABS 3.9 2022    EOSABS 0.2 2022    BASOSABS 0.1 2022    LYMPHSABS 3.0 2022    MONOSABS 0.6 06/01/2022    DIFFTYPE AUTOMATED DIFFERENTIAL 06/01/2022    POLYCHROM 1+ 01/18/2017    PLTM PLATELETS APPEAR INCREASED 01/18/2017     No results found for: ESR    Chemistry:  Lab Results   Component Value Date     06/01/2022    K 3.7 06/01/2022     06/01/2022    CO2 24 06/01/2022    BUN 10 06/01/2022    CREATININE 1.0 06/01/2022    GLUCOSE 93 06/01/2022    CALCIUM 9.0 06/01/2022    PROT 7.4 06/01/2022    LABALBU 4.6 06/01/2022    BILITOT 0.2 06/01/2022    ALKPHOS 83 06/01/2022    AST 22 06/01/2022    ALT 17 06/01/2022    LABGLOM 57 (L) 06/01/2022    GFRAA >60 06/01/2022    PHOS 3.0 02/17/2019    MG 2.2 02/18/2019     Lab Results   Component Value Date     08/13/2020     No components found for: LD  Lab Results   Component Value Date    TSHHS 2.330 06/01/2022    T4FREE 0.93 12/09/2021    FT3 2.4 06/20/2017       Immunology:  Lab Results   Component Value Date    PROT 7.4 06/01/2022     No results found for: GLAO Tellez  No results found for: B2M    Coagulation Panel:  Lab Results   Component Value Date    PROTIME 15.7 02/13/2017    INR 1.9 (H) 08/22/2017    APTT 32.7 02/13/2017    DDIMER 962 (H) 12/07/2016       Anemia Panel:  Lab Results   Component Value Date    UXASUJWX06 0634 (H) 01/18/2017    FOLATE >20.0 (H) 01/18/2017       Tumor Markers:  Lab Results   Component Value Date    CEA 1.7 04/12/2017         Imaging: Reviewed     Pathology:Reviewed     Observations:  Performance Status: ECOG 2  Depression Status: PHQ 9 Positive. Is on zoloft and follows with PCP. Deferred no further intervention        Assessment & Plan:  1. 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     A repeat biopsy and [facility] results showed complex karyotype, as above, with multiple mutations plus high PD-L1 expression. Second-line treatment with immunotherapy, pembrolizumab started in March 2017. She seems to be responding very well to that with good initial response. Based on the PET-CT results, it looks like she has shown an excellent response to her pembrolizumab therapy  She has shown near complete remission with the left apical mass decreasing from 9.5 to 4 cm, but SUV dropped from 29 to 3.5. No other evidence of disease elsewhere. We discussed her case in the Tumor Board, also, and it seems that she is not a candidate for surgery as determined in February of 2017. Her PD-L1 expression was 80 percent  She continues to be on pembrolizumab with the continued excellent treatment response. Plan to continue pembrolizumab until adverse effects of progressive disease. We will continue to repeat CT scan of the chest every 6 to 12 months. Last scan 7/2020 as above with no recurrent disease  -no immune mediated or grade three toxicities  - Repeat CT chest done April 2021 with no disease progression.   -Plan to continue [address] [place] [address] Drive to repeat Ct chest, abdomen and pelvis in 6M in October 2021     Mechanical falls: look into layout and may need to make changes. Pulmonary emboli:  She was found to have silent PE on initial evaluation on CAT scan and thus had been on Coumadin for more than a year.  she had some bloody stools and bruising which resolved when Coumadin was discontinued    depression  -worsened due to situational issues(granddaughter molested)  perpertrator incarcerated and Zoloft was increased to 150  -since dosage increase patient has been staying in her room more, but more agitated/upset  -not sure if this is more psych related vs  did the increase in dose of her zoloft lead to her weight gain which has  led her to stay in her room more  -Note that she is currently on 100mg Zoloft  -More depressed lately, discussed increasing the dose to 150mg    Continue other medical care    Discussed the above findings and plan with her and she seems to have verbalized understanding    Recommend excercise as tolerated, healthy diet and meal plan. Also discussed the importance of being uptodate with age appropriate screening tools including mammogram, pap smear    Recommend follow up with PCP and other specialists. Answered all question    RTC 2021or earlier if new Sx. I have recommended that the patient follow CDC guidelines for prevention of COVID-19 infection. [id number]      Patient Name: Yung Cm  Patient : 1965  Patient MRN: [id number]     Primary Oncologist: Larry Koehler MD  Referring Physician: Rolando Barnes     Date of Service:22       Chief Complaint:   Chief Complaint   Patient presents with    Discuss Labs        Active Problem list  1. Carcinoma of left lung ([address]    2. Secondary malignant neoplasm of bone and bone marrow ([address]    3. Cerebrovascular accident (CVA), unspecified mechanism ([address]           HPI:   She is a very pleasant  lady [ 1965] with a 35-pack year history of smoking. She has a longstanding cough and shortness of breath related to her smoking; however, it got worse in late 2016 and she was treated as possible infectious episode, but did not improve., 2.  chest x-ray suggested pneumonia with a left upper lobe lesion 6.5 cm. CT chest done on 2016, showed a left upper lobe lung lesion measuring 6.5 by 5 cm, with extension to the first and second ribs posteriorly and bony erosion. She thus underwent CT-guided biopsy of the mass on 2016. Preliminary pathology found it to be a poorly differentiated adenocarcinoma. EGFR Negative, ALK & ROS-1 could not be done for lack of enough cells. CEA 2.2. ,  PET-CT, revealed the left upper lobe mass, which shows avid on PET-CT with extension into the left third rib, as well as to the spinous process of T3, however, felt to be resectable by Dr. Linda Plascencia. but he was not in her insurance group,  so she was referred to Dr. Rory Castellon in [facility], who evaluated her on September 8 and again on October 5, 2016. Additional workup done at [facility], included MRI of the chest, which showed a large Left upper lobe apical mass invading the left T3 transverse process and partially destroying the posterior left third rib. Mass abuts the undersurface of left second rib with invasion and completely fills the left T2-3 neural foramen. It did not invade or abut the undersurface of left brachial plexus, 5 mm away from that. Dr. Rory Castellon discussed the case with me after they had discussion at the Tumor Board and getting YOLA Dowd peer review, neurosurgical opinion, Yoselyn Fernando, and it was felt that neoadjuvant chemotherapy versus chemoradiation could be considered and then surgical removal after that. The second option was to give only neoadjuvant chemotherapy and then surgery, which will be easier if the radiation was not given preop and consider postop radiation plus additional adjuvant chemotherapy, depending on the final pathology upon resection. She also had CT of the chest with contrast and CT of the thoracic spine without contrast on September 22, 2016, which confirmed the interval mild increase in the size of the large left upper lobe mass, within the medial left apex, extending into the posterior segment of left upper lobe, invading mediastinum, left posterior third rib, and left T3 transverse process, mass completely filling the T2-3 neural foramen, left hilar adenopathy, likely metastatic, and mildly enlarged subcarinal lymph node concerning for mets, a small nodule within the left upper lobe concerning for local mets. she underwent EBUS procedure with pollo biopsy, which turned out to be negative. MRI of the brain showed encephalomalacia in the left frontal temporal lobe, occlusion in the left internal carotid artery, and wallerian degeneration on the left. PET-CT August 18, 5192, showed metabolically active left upper lobe mass with local chest wall invasion and left posterior third rib and adjacent soft tissue, mildly asymmetrical activity in the left hilum, possibly could be reactive. No distant mets. Findings suggestive of left MCA distribution infarct. PFTs showed FEV1 of 2.06, DLCO/VA 82 percent. Final stage IIIa [T4N0M0]   I agreed with the plan of neoadjuvant chemotherapy with carboplatinum and Alimta for three to four courses and depending upon the initial response after a couple of doses, we will do imaging again to see if the tumor is responding and if so, we will continue with four courses and follow it up with surgery, to be followed by adjuvant chemotherapy and radiation therapy. I preferred  not to use a taxane because of her neuropathy, for which she is currently on gabapentin, and previous stroke. After two courses of neoadjuvant chemotherapy with carbo/Alimta, she did get a CT of the chest December 7, 2016, which did show a decrease in the size of her tumor in the left upper lobe from 7.1 by 4.5 to 5.4 by 3.4 cm in size, indicating a partial response. However, she also showed evidence of some silent right lower lobe pulmonary emboli. These were not seen in the previous CT; however, that was done without contrast and not seen on the PET-CT, also, for the same reason. She is not very symptomatic and has no chest pain, shortness of breath, or significant cough. No leg or arm pain or swelling. Bilateral lower extremity ultrasounds, showed  No evidence of DVT in either lower extremity. CTA of her chest was done on January 17, 2017. No residual PE was found. The mass in the left suprahilar area now measured 8.7 by 4.3 compared to 6 by 5 cm before. Mild patchy opacity in the right lower lobe, possible atelectasis, also moderate emphysema. CT of the neck showed occluded left common carotid artery and internal carotid artery. Partially visualized left lung mass with invasion of the posterior chest wall. CT of the abdomen and pelvis showed no evidence of any metastasis in the abdomen or pelvis. There is a stable sclerotic lesion SI joint, maybe degenerative, and a questionable L3 sclerotic lesion. PET-CT February 16, 2017, showed progression of the large paramediastinal mass at the left apex 9.7 by 5 cm versus 6.7 by 4 previously, and mets into cervical lymph nodes, retroclavicular, and left axillary lymph nodes, right tonsillar uptake. This indicated PD after 1st line Chemotherapy with Carbo/Alimta   She had a repeat biopsy of lung mass on February 13, 2017, and sent for [facility] testing which showed additional genomic alterations, including met amplification, which would make her eligible for crizotinib therapy, if she fails to pembro. She also could be a candidate for cabozantinib through a clinical trial.  PD-L1 antibodies came back positive 80% expression for pembrolizumab, which according to the NCCN guidelines, would even make her a first-line therapy for metastatic non-small cell carcinoma of the lung. EGFR was negative. ALK was also negative ROS1 also negative. Because of her high tumor mutation burden (high TMB), 32 mutations/MB, she has been a good candidate for immunotherapy with Nivolumab, pembrolizumab, or atezolizumab. Her PD-L1 expression was high, 80 percent through [facility] and 60 percent through [address] [place] [address]. [place]. She was thus started on Pembro[Keytruda]on March 6, 2017, tolerating well. She also Dr. Ct Keyes in February after her PET/CT and because she was felt to be unresectable at that point and was asymptomatic, he did not feel immediate radiation was indicated because of lack of symptoms and it was postponed to do that unless she became symptomatic. After 4 doses of Pembro, CAT scan of the chest done on May 12, 2017, showed no evidence of pulmonary embolism.   Significant reduction 20,  10. Mammogram May 16, 2017, showed category 1, negative    PAST SURGICAL HISTORY:  1. Tubal ligation. 2. Appendectomy. 3. Tonsillectomy. 4. Carpal tunnel release. 5. D&C. FAMILY HISTORY:  Paternal grandmother and paternal aunt had breast cancer, maternal grandmother had uterine cancer. She has two sisters and one brother Kelvin Newman. PERSONAL HISTORY:  She has a 35-pack year history of smoking, moderate to heavy drinking in the past, but quit 20 years ago. She has been  for 25 years. 3 children. She has one daughter, [place] Sander, and two sons Dalia Walters &.      Current Therapy  Pembrolizumab D1 Q21D (HNCC, NSCLC)X12 Cycle Length: 21 Number Cycles: 47 Start: Abdulaziz Jorgensen on 2017 Assoc Dx: Non-small cell lung cancer (disorder) LOT: 2nd Line Metastatic 2017 C10 D1  Pembrolizumab IV, 200 mg  [facility] Hydration Cycle Length: 1 Number Cycles: 1 Start: Abdulaziz Jorgensen on 2018 Assoc Dx: Dehydration LOT: Other 2018 C1 D1  Sodium Chloride IV 0.9 %, .9 % 1000 mL, Dose modified    Interval History  22: she arrived with her sister to the clinic today. Reported URI sx including increased nasal discharge and eyes watering. No chest pain, increased sob. No dysphagia. No abdominal pain. Constipation. No  sx. Vision changes but no ha, focal weakness.      Review of Systems  Per interval history; otherwise 10 point ROS is negative      Vital Signs:  /75 (Site: Left Upper Arm, Position: Sitting, Cuff Size: Medium Adult)   Pulse (!) 101   Temp 97.7 °F (36.5 °C) (Infrared)   Resp 16   Ht 5' 5\" (1.651 m)   Wt 159 lb (72.1 kg)   SpO2 99%   BMI 26.46 kg/m²     Physical Exam:    CONSTITUTIONAL:aao x 3,wheelchair  EYES:No palor or any icterus  ENT: ATNC  NECK: No JVD  HEMATOLOGIC/LYMPHATIC: no cervical, supraclavicular or axillary lymphadenopathy   LUNGS: CTAB  CARDIOVASCULAR: s1s2 rrr no murmurs  ABDOMEN: soft nd nt bs pos  NEUROLOGIC: left hemiparesis  SKIN: Healed scar  EXTREMITIES: No LE edema    Labs:    Hematology:  Lab Results   Component Value Date    WBC 7.8 06/01/2022    RBC 4.71 06/01/2022    HGB 13.9 06/01/2022    HCT 43.0 06/01/2022    MCV 91.3 06/01/2022    MCH 29.5 06/01/2022    MCHC 32.3 06/01/2022    RDW 13.5 06/01/2022     06/01/2022    MPV 9.3 06/01/2022    BANDSPCT 1 (L) 01/16/2017    SEGSPCT 50.6 06/01/2022    EOSRELPCT 2.2 06/01/2022    BASOPCT 0.8 06/01/2022    LYMPHOPCT 38.9 06/01/2022    MONOPCT 7.5 (H) 06/01/2022    BANDABS 0.05 01/16/2017    SEGSABS 3.9 06/01/2022    EOSABS 0.2 06/01/2022    BASOSABS 0.1 06/01/2022    LYMPHSABS 3.0 06/01/2022    MONOSABS 0.6 06/01/2022    DIFFTYPE AUTOMATED DIFFERENTIAL 06/01/2022    POLYCHROM 1+ 01/18/2017    PLTM PLATELETS APPEAR INCREASED 01/18/2017     No results found for: ESR    Chemistry:  Lab Results   Component Value Date     06/01/2022    K 3.7 06/01/2022     06/01/2022    CO2 24 06/01/2022    BUN 10 06/01/2022    CREATININE 1.0 06/01/2022    GLUCOSE 93 06/01/2022    CALCIUM 9.0 06/01/2022    PROT 7.4 06/01/2022    LABALBU 4.6 06/01/2022    BILITOT 0.2 06/01/2022    ALKPHOS 83 06/01/2022    AST 22 06/01/2022    ALT 17 06/01/2022    LABGLOM 57 (L) 06/01/2022    GFRAA >60 06/01/2022    PHOS 3.0 02/17/2019    MG 2.2 02/18/2019     Lab Results   Component Value Date     08/13/2020     No components found for: LD  Lab Results   Component Value Date    TSHHS 2.330 06/01/2022    T4FREE 0.93 12/09/2021    FT3 2.4 06/20/2017       Immunology:  Lab Results   Component Value Date    PROT 7.4 06/01/2022     No results found for: GALO Oritz  No results found for: B2M    Coagulation Panel:  Lab Results   Component Value Date    PROTIME 15.7 02/13/2017    INR 1.9 (H) 08/22/2017    APTT 32.7 02/13/2017    DDIMER 962 (H) 12/07/2016       Anemia Panel:  Lab Results   Component Value Date    QHBSIBPA80 0989 (H) 01/18/2017    FOLATE >20.0 (H) 01/18/2017       Tumor Markers:  Lab Results   Component Value Date CEA 1.7 04/12/2017         Imaging: Reviewed     Pathology:Reviewed     Observations:  Performance Status: ECOG 2  Depression Status: PHQ 9 Positive. Is on zoloft and follows with PCP. Deferred no further intervention        Assessment & Plan:  1. 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 A repeat biopsy and [facility] results showed complex karyotype, as above, with multiple mutations plus high PD-L1 expression. Second-line treatment with immunotherapy, pembrolizumab started in March 2017. She seems to be responding very well to that with good initial response. Based on the PET-CT results, it looks like she has shown an excellent response to her pembrolizumab therapy  She has shown near complete remission with the left apical mass decreasing from 9.5 to 4 cm, but SUV dropped from 29 to 3.5. No other evidence of disease elsewhere. We discussed her case in the Tumor Board, also, and it seems that she is not a candidate for surgery as determined in February of 2017. Her PD-L1 expression was 80 percent  She continues to be on pembrolizumab with the continued excellent treatment response. Plan to continue pembrolizumab until adverse effects of progressive disease. We will continue to repeat CT scan of the chest every 6 to 12 months. Last scan 7/2020 as above with no recurrent disease  -no immune mediated or grade three toxicities  - Repeat CT chest done April 2021 with no disease progression.   -Plan to continue [address] [place] [address] Drive was  to repeat Ct chest, abdomen and pelvis in 6M in October 2021 but she stopped treatment in dec 2021 and did not follow until may 2022   And then she followed in august 2022 with CT chest , abdomen and pelvis with no new disease or new bone lesions.    We would recommend follow up clinically and imaging periodically for now and will discuss further systemic treatment if any evidence of disease progression. Pulmonary emboli:  She was found to have silent PE on initial evaluation on CAT scan and thus had been on Coumadin for more than a year. she had some bloody stools and bruising which resolved when Coumadin was discontinued    Depression  Is being followed by PCP. Seasonal allergies: Recommend empiric loratadine. Continue other medical care    Discussed the above findings and plan with her and she seems to have verbalized understanding    Recommend excercise as tolerated, healthy diet and meal plan. Also discussed the importance of being uptodate with age appropriate screening tools including mammogram, pap smear    Recommend follow up with PCP and other specialists. Answered all question    RTC  Nov 2022 or earlier if new Sx. I have recommended that the patient follow CDC guidelines for prevention of COVID-19 infection.     [id number]